# Patient Record
Sex: MALE | Race: WHITE | NOT HISPANIC OR LATINO | Employment: OTHER | URBAN - METROPOLITAN AREA
[De-identification: names, ages, dates, MRNs, and addresses within clinical notes are randomized per-mention and may not be internally consistent; named-entity substitution may affect disease eponyms.]

---

## 2023-05-09 ENCOUNTER — OFFICE VISIT (OUTPATIENT)
Dept: PRIMARY CARE | Facility: CLINIC | Age: 78
End: 2023-05-09
Payer: MEDICARE

## 2023-05-09 VITALS
SYSTOLIC BLOOD PRESSURE: 118 MMHG | HEART RATE: 92 BPM | HEIGHT: 69 IN | BODY MASS INDEX: 25.18 KG/M2 | OXYGEN SATURATION: 99 % | WEIGHT: 170 LBS | DIASTOLIC BLOOD PRESSURE: 77 MMHG

## 2023-05-09 DIAGNOSIS — S76.309S HAMSTRING INJURY, SEQUELA: Primary | ICD-10-CM

## 2023-05-09 PROCEDURE — 99213 OFFICE O/P EST LOW 20 MIN: CPT | Performed by: FAMILY MEDICINE

## 2023-05-09 RX ORDER — ATORVASTATIN CALCIUM 20 MG/1
1 TABLET, FILM COATED ORAL DAILY
COMMUNITY
Start: 2018-12-18 | End: 2023-11-05

## 2023-05-09 RX ORDER — WARFARIN 4 MG/1
1 TABLET ORAL DAILY
COMMUNITY
Start: 2014-04-07 | End: 2023-12-27 | Stop reason: SDUPTHER

## 2023-05-09 RX ORDER — METOPROLOL TARTRATE 25 MG/1
1 TABLET, FILM COATED ORAL 2 TIMES DAILY
COMMUNITY
Start: 2015-03-31 | End: 2023-11-05

## 2023-05-09 ASSESSMENT — ENCOUNTER SYMPTOMS
CARDIOVASCULAR NEGATIVE: 1
GASTROINTESTINAL NEGATIVE: 1
MYALGIAS: 1
CONSTITUTIONAL NEGATIVE: 1
ARTHRALGIAS: 1
LEG PAIN: 1

## 2023-05-09 NOTE — PROGRESS NOTES
Subjective   Patient ID: José Barth is a 78 y.o. male who presents for Leg Pain.  Leg Pain       Pt co hamstring m pain w decr rom  Review of Systems   Constitutional: Negative.    HENT: Negative.     Cardiovascular: Negative.    Gastrointestinal: Negative.    Genitourinary: Negative.    Musculoskeletal:  Positive for arthralgias, gait problem and myalgias.       Objective   Physical Exam  Constitutional:       Appearance: Normal appearance.   HENT:      Right Ear: Tympanic membrane normal.      Nose: Nose normal.      Mouth/Throat:      Mouth: Mucous membranes are moist.   Eyes:      Pupils: Pupils are equal, round, and reactive to light.   Cardiovascular:      Rate and Rhythm: Normal rate and regular rhythm.   Pulmonary:      Effort: Pulmonary effort is normal.      Breath sounds: Normal breath sounds.   Musculoskeletal:         General: Tenderness present.   Neurological:      Mental Status: He is alert.         Assessment/Plan

## 2023-07-25 ENCOUNTER — OFFICE VISIT (OUTPATIENT)
Dept: PRIMARY CARE | Facility: CLINIC | Age: 78
End: 2023-07-25
Payer: MEDICARE

## 2023-07-25 VITALS
HEIGHT: 69 IN | SYSTOLIC BLOOD PRESSURE: 133 MMHG | WEIGHT: 165 LBS | DIASTOLIC BLOOD PRESSURE: 82 MMHG | BODY MASS INDEX: 24.44 KG/M2 | HEART RATE: 66 BPM | OXYGEN SATURATION: 96 %

## 2023-07-25 DIAGNOSIS — L73.9 FOLLICULITIS: Primary | ICD-10-CM

## 2023-07-25 PROCEDURE — 99213 OFFICE O/P EST LOW 20 MIN: CPT | Performed by: FAMILY MEDICINE

## 2023-07-25 RX ORDER — CEPHALEXIN 500 MG/1
500 CAPSULE ORAL 3 TIMES DAILY
Qty: 30 CAPSULE | Refills: 0 | Status: SHIPPED | OUTPATIENT
Start: 2023-07-25 | End: 2023-08-04

## 2023-07-25 ASSESSMENT — ENCOUNTER SYMPTOMS
CONSTITUTIONAL NEGATIVE: 1
RESPIRATORY NEGATIVE: 1
CARDIOVASCULAR NEGATIVE: 1

## 2023-07-25 NOTE — PROGRESS NOTES
Subjective   Patient ID: José Barth is a 78 y.o. male.    Rash      Punctate rash on trunk mildly puritic and painfull red no pustules no vescicles  Review of Systems   Constitutional: Negative.    HENT: Negative.     Respiratory: Negative.     Cardiovascular: Negative.    Skin:  Positive for rash.       Objective   Physical Exam  HENT:      Head: Normocephalic and atraumatic.      Nose: Nose normal.      Mouth/Throat:      Mouth: Mucous membranes are moist.   Eyes:      Extraocular Movements: Extraocular movements intact.      Pupils: Pupils are equal, round, and reactive to light.   Cardiovascular:      Rate and Rhythm: Rhythm irregular.   Pulmonary:      Effort: Pulmonary effort is normal.      Breath sounds: Normal breath sounds.   Musculoskeletal:         General: Normal range of motion.   Skin:     Findings: Rash present.         Assessment/Plan   There are no diagnoses linked to this encounter.

## 2023-07-27 ENCOUNTER — APPOINTMENT (OUTPATIENT)
Dept: PRIMARY CARE | Facility: CLINIC | Age: 78
End: 2023-07-27
Payer: MEDICARE

## 2023-09-21 ENCOUNTER — OFFICE VISIT (OUTPATIENT)
Dept: PRIMARY CARE | Facility: CLINIC | Age: 78
End: 2023-09-21
Payer: MEDICARE

## 2023-09-21 VITALS
SYSTOLIC BLOOD PRESSURE: 121 MMHG | WEIGHT: 163 LBS | HEIGHT: 69 IN | DIASTOLIC BLOOD PRESSURE: 73 MMHG | OXYGEN SATURATION: 95 % | BODY MASS INDEX: 24.14 KG/M2 | HEART RATE: 76 BPM

## 2023-09-21 DIAGNOSIS — M54.50 ACUTE BILATERAL LOW BACK PAIN WITHOUT SCIATICA: Primary | ICD-10-CM

## 2023-09-21 PROCEDURE — 99214 OFFICE O/P EST MOD 30 MIN: CPT | Performed by: FAMILY MEDICINE

## 2023-09-21 ASSESSMENT — ENCOUNTER SYMPTOMS
BACK PAIN: 1
RESPIRATORY NEGATIVE: 1
CONSTITUTIONAL NEGATIVE: 1
CARDIOVASCULAR NEGATIVE: 1

## 2023-09-21 NOTE — PROGRESS NOTES
Subjective   Patient ID: José Barth is a 78 y.o. male who presents for Back Pain (Fell 5 days ago).  Back Pain      Thoracolumbar spine pain sp fall   Review of Systems   Constitutional: Negative.    HENT: Negative.     Respiratory: Negative.     Cardiovascular: Negative.    Genitourinary: Negative.    Musculoskeletal:  Positive for back pain.       Objective   Physical Exam  General no acute process no icterus well-hydrated alert active oriented    HEENT normocephalic no palpable tenderness eyes pupils equal reactive light and accommodation extraocular muscles intact no icterus and/or erythema ears benign external auditory canal no gross deformities nose no discharge drainage erythema bleeding throat no erythema.    Heart regular rate and rhythm without S3-S4 or murmur    Lungs clear to auscultation x2 no rales or rhonchi    Abdomen soft nontender nondistended no palpable masses no organomegaly splenomegaly.    Integument no rash no lumps bumps or concerning lesions.    Neurologic no tics tremors or seizures no decreased range of motion or ataxia.    Musculoskeletal good range of motion no gross abnormalities noted patient has some diminished range of motion to the thoracic spine with palpable tenderness we will send him for some x-rays at home in all probability he might have a crush fracture of one of his thoracic vertebrae  Assessment/Plan   Diagnoses and all orders for this visit:  Acute bilateral low back pain without sciatica  -     XR thoracic spine 3 views; Future  -     XR lumbar spine complete 4+ views    As an addendum patient was noted to have a compression fracture of the thoracic spine patient said he is doing well he is can to take it easy told him he should probably follow-up in a month

## 2023-09-25 ENCOUNTER — TELEPHONE (OUTPATIENT)
Dept: PRIMARY CARE | Facility: CLINIC | Age: 78
End: 2023-09-25

## 2023-10-04 ENCOUNTER — ANTICOAGULATION - WARFARIN VISIT (OUTPATIENT)
Dept: PHARMACY | Facility: CLINIC | Age: 78
End: 2023-10-04
Payer: MEDICARE

## 2023-10-04 DIAGNOSIS — I48.91 ATRIAL FIBRILLATION, UNSPECIFIED TYPE (MULTI): Primary | ICD-10-CM

## 2023-10-04 LAB
POC INR: 3.9
POC PROTHROMBIN TIME: NORMAL

## 2023-10-04 PROCEDURE — 99212 OFFICE O/P EST SF 10 MIN: CPT

## 2023-10-04 PROCEDURE — 85610 PROTHROMBIN TIME: CPT

## 2023-10-04 NOTE — PROGRESS NOTES
Coumadin Clinic Visit Note    Patient verified warfarin dose  No missed doses  PT fell a few weeks ago and has a compression fracture in his back   No unusual bruising or bleeding  Pt has been taking acetaminophen 500 mg po twice daily for the last 2 weeks for his back pain  No other changes to medications  Consistent dietary green intake  No anticipated procedures at this time  INR Supratherapeutic today at 3.9  Inr most likely due to increase in acetaminophen use  Hold today's warfarin dose  Keep weekly dose same for now   PT will try to increase greens slightly when take acetaminophen as well as try and decrease acetaminophen use if possible  Next appointment 3 weeks    Zaynab Carney, Pharm D

## 2023-10-23 PROBLEM — M25.559 ARTHRALGIA OF HIP: Status: ACTIVE | Noted: 2023-10-23

## 2023-10-23 PROBLEM — Z79.01 ENCOUNTER FOR CURRENT LONG-TERM USE OF ANTICOAGULANTS: Status: ACTIVE | Noted: 2022-11-02

## 2023-10-23 PROBLEM — M54.32 SCIATICA OF LEFT SIDE WITHOUT BACK PAIN: Status: ACTIVE | Noted: 2023-10-23

## 2023-10-23 PROBLEM — I48.21 PERMANENT ATRIAL FIBRILLATION (MULTI): Status: ACTIVE | Noted: 2022-11-02

## 2023-10-23 PROBLEM — E78.00 PURE HYPERCHOLESTEROLEMIA: Status: ACTIVE | Noted: 2023-10-23

## 2023-10-23 PROBLEM — E78.5 HYPERLIPEMIA: Status: ACTIVE | Noted: 2023-10-23

## 2023-10-23 PROBLEM — Z96.642 PRESENCE OF LEFT ARTIFICIAL HIP JOINT: Status: ACTIVE | Noted: 2022-11-02

## 2023-10-23 PROBLEM — I83.90 VARICOSE VEIN OF LEG: Status: ACTIVE | Noted: 2023-10-23

## 2023-10-23 PROBLEM — Z96.642 STATUS POST LEFT HIP REPLACEMENT: Status: ACTIVE | Noted: 2023-10-23

## 2023-10-23 PROBLEM — M54.16 LUMBAR RADICULOPATHY: Status: ACTIVE | Noted: 2023-10-23

## 2023-10-23 PROBLEM — M79.89 SWELLING OF LOWER EXTREMITY: Status: ACTIVE | Noted: 2023-10-23

## 2023-10-23 PROBLEM — M16.12 ARTHRITIS OF LEFT HIP: Status: ACTIVE | Noted: 2023-10-23

## 2023-10-23 PROBLEM — M19.90 OSTEOARTHRITIS: Status: ACTIVE | Noted: 2023-10-23

## 2023-10-23 RX ORDER — AMOXICILLIN 500 MG/1
4 TABLET, FILM COATED ORAL
Status: ON HOLD | COMMUNITY
Start: 2023-06-09 | End: 2024-03-08 | Stop reason: WASHOUT

## 2023-10-23 RX ORDER — METOPROLOL SUCCINATE 50 MG/1
50 TABLET, EXTENDED RELEASE ORAL DAILY
Status: ON HOLD | COMMUNITY
Start: 2022-11-04 | End: 2024-03-08 | Stop reason: WASHOUT

## 2023-10-23 RX ORDER — GUAIFENESIN 1200 MG
325 TABLET, EXTENDED RELEASE 12 HR ORAL DAILY
COMMUNITY
Start: 2022-11-04 | End: 2024-03-13 | Stop reason: HOSPADM

## 2023-10-23 RX ORDER — DOCUSATE SODIUM 100 MG/1
100 CAPSULE, LIQUID FILLED ORAL DAILY
Status: ON HOLD | COMMUNITY
Start: 2022-11-04 | End: 2024-03-08 | Stop reason: WASHOUT

## 2023-10-23 RX ORDER — OXYCODONE HYDROCHLORIDE 5 MG/1
5 TABLET ORAL EVERY 4 HOURS PRN
Status: ON HOLD | COMMUNITY
Start: 2022-11-03 | End: 2024-03-08 | Stop reason: WASHOUT

## 2023-10-23 RX ORDER — ENOXAPARIN SODIUM 100 MG/ML
30 INJECTION SUBCUTANEOUS 2 TIMES DAILY
Status: ON HOLD | COMMUNITY
Start: 2022-11-03 | End: 2024-03-08 | Stop reason: WASHOUT

## 2023-10-23 RX ORDER — POLYETHYLENE GLYCOL 3350 17 G/17G
POWDER, FOR SOLUTION ORAL 2 TIMES DAILY
Status: ON HOLD | COMMUNITY
Start: 2022-11-04 | End: 2024-03-08 | Stop reason: WASHOUT

## 2023-10-23 RX ORDER — ENOXAPARIN SODIUM 100 MG/ML
30 INJECTION SUBCUTANEOUS DAILY
Status: ON HOLD | COMMUNITY
Start: 2022-11-04 | End: 2024-03-08 | Stop reason: WASHOUT

## 2023-10-25 ENCOUNTER — ANTICOAGULATION - WARFARIN VISIT (OUTPATIENT)
Dept: PHARMACY | Facility: CLINIC | Age: 78
End: 2023-10-25
Payer: MEDICARE

## 2023-10-25 DIAGNOSIS — I48.91 ATRIAL FIBRILLATION, UNSPECIFIED TYPE (MULTI): Primary | ICD-10-CM

## 2023-10-25 LAB
POC INR: 3.4
POC PROTHROMBIN TIME: NORMAL

## 2023-10-25 PROCEDURE — 85610 PROTHROMBIN TIME: CPT | Mod: QW

## 2023-10-25 PROCEDURE — 99212 OFFICE O/P EST SF 10 MIN: CPT

## 2023-10-25 NOTE — PROGRESS NOTES
Coumadin Clinic Visit Note    Patient verified warfarin dose; confirms skipping dose after last visit  No missed doses  No unusual bruising or bleeding  No changes to medications; no longer taking Tylenol for back pain; stopped ~2 weeks ago  Consistent dietary green intake; a little bit more than last visit  No anticipated procedures at this time  INR Supratherapeutic today at 3.4; has been high for last 3 visits on same dose; thought it was Tylenol after last visit, so no dose change was made at that time  Will reduce weekly dose from 28 to 26 mg (7.1% decrease).  Now take half tablet (2 mg) on Wednesdays  Next appointment 4 weeks    Ludin Gatica, PharmD

## 2023-11-03 DIAGNOSIS — I10 HYPERTENSION, UNSPECIFIED TYPE: Primary | ICD-10-CM

## 2023-11-05 RX ORDER — METOPROLOL TARTRATE 25 MG/1
25 TABLET, FILM COATED ORAL 2 TIMES DAILY
Qty: 60 TABLET | Refills: 0 | Status: SHIPPED | OUTPATIENT
Start: 2023-11-05 | End: 2023-12-14 | Stop reason: SDUPTHER

## 2023-11-05 RX ORDER — ATORVASTATIN CALCIUM 20 MG/1
20 TABLET, FILM COATED ORAL DAILY
Qty: 30 TABLET | Refills: 0 | Status: SHIPPED | OUTPATIENT
Start: 2023-11-05 | End: 2023-12-14 | Stop reason: SDUPTHER

## 2023-11-22 ENCOUNTER — ANTICOAGULATION - WARFARIN VISIT (OUTPATIENT)
Dept: PHARMACY | Facility: CLINIC | Age: 78
End: 2023-11-22
Payer: MEDICARE

## 2023-11-22 ENCOUNTER — APPOINTMENT (OUTPATIENT)
Dept: PHARMACY | Facility: CLINIC | Age: 78
End: 2023-11-22
Payer: MEDICARE

## 2023-11-22 DIAGNOSIS — I48.91 ATRIAL FIBRILLATION, UNSPECIFIED TYPE (MULTI): Primary | ICD-10-CM

## 2023-11-22 NOTE — PROGRESS NOTES
Patient rescheduled from 11/22 to 11/29/23   Updated tracker to reflect updated appt date     Carrie Bhatti, Ronit, BCPS

## 2023-11-24 DIAGNOSIS — I10 HYPERTENSION, UNSPECIFIED TYPE: ICD-10-CM

## 2023-11-24 DIAGNOSIS — E78.5 HYPERLIPIDEMIA, UNSPECIFIED HYPERLIPIDEMIA TYPE: ICD-10-CM

## 2023-11-29 ENCOUNTER — ANTICOAGULATION - WARFARIN VISIT (OUTPATIENT)
Dept: PHARMACY | Facility: CLINIC | Age: 78
End: 2023-11-29
Payer: MEDICARE

## 2023-11-29 DIAGNOSIS — I48.91 ATRIAL FIBRILLATION, UNSPECIFIED TYPE (MULTI): Primary | ICD-10-CM

## 2023-11-29 LAB
POC INR: 3.1
POC PROTHROMBIN TIME: NORMAL

## 2023-11-29 PROCEDURE — 99212 OFFICE O/P EST SF 10 MIN: CPT

## 2023-11-29 PROCEDURE — 85610 PROTHROMBIN TIME: CPT | Mod: QW

## 2023-11-29 NOTE — PROGRESS NOTES
Coumadin Clinic Visit Note    Patient verified warfarin dose  No missed doses  No unusual bruising or bleeding  No changes to medications  Consistent dietary green intake  No anticipated procedures at this time  INR Supratherapeutic today at 3.1, but will keep same dose  No changes to warfarin dose today  Next appointment 4 weeks      Rudy Ortiz, PharmD

## 2023-12-14 RX ORDER — ATORVASTATIN CALCIUM 20 MG/1
20 TABLET, FILM COATED ORAL DAILY
Qty: 90 TABLET | Refills: 0 | Status: SHIPPED | OUTPATIENT
Start: 2023-12-14 | End: 2024-02-08 | Stop reason: SDUPTHER

## 2023-12-14 RX ORDER — METOPROLOL TARTRATE 25 MG/1
25 TABLET, FILM COATED ORAL 2 TIMES DAILY
Qty: 180 TABLET | Refills: 0 | Status: SHIPPED | OUTPATIENT
Start: 2023-12-14 | End: 2024-02-08 | Stop reason: SDUPTHER

## 2023-12-27 DIAGNOSIS — I48.91 ATRIAL FIBRILLATION, UNSPECIFIED TYPE (MULTI): Primary | ICD-10-CM

## 2023-12-27 RX ORDER — WARFARIN 4 MG/1
TABLET ORAL
Qty: 100 TABLET | Refills: 1 | Status: SHIPPED | OUTPATIENT
Start: 2023-12-27 | End: 2024-05-16

## 2023-12-27 RX ORDER — WARFARIN 4 MG/1
4 TABLET ORAL
Qty: 90 TABLET | Refills: 4 | OUTPATIENT
Start: 2023-12-27

## 2023-12-28 ENCOUNTER — ANTICOAGULATION - WARFARIN VISIT (OUTPATIENT)
Dept: PHARMACY | Facility: CLINIC | Age: 78
End: 2023-12-28
Payer: MEDICARE

## 2023-12-28 DIAGNOSIS — I48.91 ATRIAL FIBRILLATION, UNSPECIFIED TYPE (MULTI): Primary | ICD-10-CM

## 2023-12-28 LAB
POC INR: 1.4
POC PROTHROMBIN TIME: NORMAL

## 2023-12-28 PROCEDURE — 99212 OFFICE O/P EST SF 10 MIN: CPT

## 2023-12-28 PROCEDURE — 85610 PROTHROMBIN TIME: CPT | Mod: QW

## 2023-12-28 NOTE — PROGRESS NOTES
No bleeding or unusual bruising.  Medications and doses verified.  No scheduled procedures at this time.  INR=1.4  No missed doses.    Pt admits to eating a lot more greens (broccoli and cauliflower) this week.  Plan: Boost dose today, then continue same weekly dose, and return to regular diet.   Follow up INR check in 2 weeks.  Pt will be going to [place] in February and will need an order for lab.

## 2024-01-11 ENCOUNTER — ANTICOAGULATION - WARFARIN VISIT (OUTPATIENT)
Dept: PHARMACY | Facility: CLINIC | Age: 79
End: 2024-01-11
Payer: MEDICARE

## 2024-01-11 ENCOUNTER — APPOINTMENT (OUTPATIENT)
Dept: PHARMACY | Facility: CLINIC | Age: 79
End: 2024-01-11
Payer: MEDICARE

## 2024-01-11 DIAGNOSIS — I48.91 ATRIAL FIBRILLATION, UNSPECIFIED TYPE (MULTI): Primary | ICD-10-CM

## 2024-01-11 LAB
POC INR: 1.8
POC PROTHROMBIN TIME: NORMAL

## 2024-01-11 PROCEDURE — 85610 PROTHROMBIN TIME: CPT | Mod: QW | Performed by: INTERNAL MEDICINE

## 2024-01-11 PROCEDURE — 99212 OFFICE O/P EST SF 10 MIN: CPT | Performed by: INTERNAL MEDICINE

## 2024-01-11 NOTE — PROGRESS NOTES
Coumadin Clinic Visit Note    Patient verified warfarin dose  No missed doses  No unusual bruising or bleeding  No changes to medications  Consistent dietary green intake  No anticipated procedures at this time  INR Subtherapeutic today at 1.8, no new MTV , no boost or ensure , no stopped or changed otc , looking at trend , was on high inr few month , dose was decreased and now trending down , may needto increase dose next visit   No changes to warfarin dose today  Next appointment 3 weeks      Patricia Pa, PharmD

## 2024-02-01 ENCOUNTER — ANTICOAGULATION - WARFARIN VISIT (OUTPATIENT)
Dept: PHARMACY | Facility: CLINIC | Age: 79
End: 2024-02-01
Payer: MEDICARE

## 2024-02-01 ENCOUNTER — CLINICAL SUPPORT (OUTPATIENT)
Dept: PHARMACY | Facility: CLINIC | Age: 79
End: 2024-02-01
Payer: MEDICARE

## 2024-02-01 DIAGNOSIS — I48.91 ATRIAL FIBRILLATION, UNSPECIFIED TYPE (MULTI): Primary | ICD-10-CM

## 2024-02-01 LAB
POC INR: 2
POC PROTHROMBIN TIME: NORMAL

## 2024-02-01 PROCEDURE — 85610 PROTHROMBIN TIME: CPT | Mod: QW

## 2024-02-01 PROCEDURE — 99212 OFFICE O/P EST SF 10 MIN: CPT

## 2024-02-01 NOTE — PROGRESS NOTES
No bleeding or unusual bruising.  Medications and doses verified.  No scheduled procedures at this time.  INR=2.0   Plan: Continue same weekly dose.  Follow up INR check in 4 weeks.

## 2024-02-07 DIAGNOSIS — I10 HYPERTENSION, UNSPECIFIED TYPE: ICD-10-CM

## 2024-02-07 DIAGNOSIS — E78.5 HYPERLIPIDEMIA, UNSPECIFIED HYPERLIPIDEMIA TYPE: ICD-10-CM

## 2024-02-08 RX ORDER — ATORVASTATIN CALCIUM 20 MG/1
20 TABLET, FILM COATED ORAL DAILY
Qty: 90 TABLET | Refills: 3 | Status: SHIPPED | OUTPATIENT
Start: 2024-02-08

## 2024-02-08 RX ORDER — METOPROLOL TARTRATE 25 MG/1
25 TABLET, FILM COATED ORAL 2 TIMES DAILY
Qty: 180 TABLET | Refills: 3 | Status: SHIPPED | OUTPATIENT
Start: 2024-02-08

## 2024-02-29 ENCOUNTER — ANTICOAGULATION - WARFARIN VISIT (OUTPATIENT)
Dept: PHARMACY | Facility: CLINIC | Age: 79
End: 2024-02-29
Payer: MEDICARE

## 2024-02-29 DIAGNOSIS — I48.91 ATRIAL FIBRILLATION, UNSPECIFIED TYPE (MULTI): Primary | ICD-10-CM

## 2024-02-29 LAB
POC INR: 1.9
POC PROTHROMBIN TIME: NORMAL

## 2024-02-29 PROCEDURE — 85610 PROTHROMBIN TIME: CPT | Mod: QW | Performed by: PHARMACIST

## 2024-02-29 PROCEDURE — 99212 OFFICE O/P EST SF 10 MIN: CPT | Performed by: PHARMACIST

## 2024-02-29 NOTE — PROGRESS NOTES
Verified current dose with pt.    No new meds or med changes since last visit.  Pt denies unusual bleed/bruise.  No upcoming procedures.  Inr = 1.9  Boost 6 mg today (thurs)  Continue same dose and check again in 3 weeks.

## 2024-03-05 DIAGNOSIS — I48.0 PAROXYSMAL ATRIAL FIBRILLATION (MULTI): Primary | ICD-10-CM

## 2024-03-07 ENCOUNTER — APPOINTMENT (OUTPATIENT)
Dept: CARDIOLOGY | Facility: HOSPITAL | Age: 79
End: 2024-03-07
Payer: MEDICARE

## 2024-03-07 ENCOUNTER — APPOINTMENT (OUTPATIENT)
Dept: RADIOLOGY | Facility: HOSPITAL | Age: 79
End: 2024-03-07
Payer: MEDICARE

## 2024-03-07 ENCOUNTER — HOSPITAL ENCOUNTER (EMERGENCY)
Facility: HOSPITAL | Age: 79
Discharge: OTHER INSTITUTION | End: 2024-03-08
Attending: EMERGENCY MEDICINE
Payer: MEDICARE

## 2024-03-07 DIAGNOSIS — M97.8XXA PERIPROSTHETIC FRACTURE OF HIP, INITIAL ENCOUNTER: Primary | ICD-10-CM

## 2024-03-07 DIAGNOSIS — Z96.649 PERIPROSTHETIC FRACTURE OF HIP, INITIAL ENCOUNTER: Primary | ICD-10-CM

## 2024-03-07 LAB
ABO GROUP (TYPE) IN BLOOD: NORMAL
ANION GAP SERPL CALC-SCNC: 13 MMOL/L (ref 10–20)
ANTIBODY SCREEN: NORMAL
BASOPHILS # BLD AUTO: 0.01 X10*3/UL (ref 0–0.1)
BASOPHILS NFR BLD AUTO: 0.1 %
BUN SERPL-MCNC: 24 MG/DL (ref 6–23)
CALCIUM SERPL-MCNC: 8.9 MG/DL (ref 8.6–10.3)
CHLORIDE SERPL-SCNC: 107 MMOL/L (ref 98–107)
CO2 SERPL-SCNC: 23 MMOL/L (ref 21–32)
CREAT SERPL-MCNC: 1.07 MG/DL (ref 0.5–1.3)
EGFRCR SERPLBLD CKD-EPI 2021: 70 ML/MIN/1.73M*2
EOSINOPHIL # BLD AUTO: 0.02 X10*3/UL (ref 0–0.4)
EOSINOPHIL NFR BLD AUTO: 0.2 %
ERYTHROCYTE [DISTWIDTH] IN BLOOD BY AUTOMATED COUNT: 12.7 % (ref 11.5–14.5)
ERYTHROCYTE [DISTWIDTH] IN BLOOD BY AUTOMATED COUNT: 12.7 % (ref 11.5–14.5)
GLUCOSE SERPL-MCNC: 93 MG/DL (ref 74–99)
HCT VFR BLD AUTO: 38 % (ref 41–52)
HCT VFR BLD AUTO: 40.1 % (ref 41–52)
HGB BLD-MCNC: 13.1 G/DL (ref 13.5–17.5)
HGB BLD-MCNC: 13.5 G/DL (ref 13.5–17.5)
IMM GRANULOCYTES # BLD AUTO: 0.08 X10*3/UL (ref 0–0.5)
IMM GRANULOCYTES NFR BLD AUTO: 0.7 % (ref 0–0.9)
INR PPP: 2.4 (ref 0.9–1.1)
LYMPHOCYTES # BLD AUTO: 0.59 X10*3/UL (ref 0.8–3)
LYMPHOCYTES NFR BLD AUTO: 5.5 %
MCH RBC QN AUTO: 34.2 PG (ref 26–34)
MCH RBC QN AUTO: 34.6 PG (ref 26–34)
MCHC RBC AUTO-ENTMCNC: 33.7 G/DL (ref 32–36)
MCHC RBC AUTO-ENTMCNC: 34.5 G/DL (ref 32–36)
MCV RBC AUTO: 100 FL (ref 80–100)
MCV RBC AUTO: 102 FL (ref 80–100)
MONOCYTES # BLD AUTO: 0.76 X10*3/UL (ref 0.05–0.8)
MONOCYTES NFR BLD AUTO: 7 %
NEUTROPHILS # BLD AUTO: 9.34 X10*3/UL (ref 1.6–5.5)
NEUTROPHILS NFR BLD AUTO: 86.5 %
NRBC BLD-RTO: 0 /100 WBCS (ref 0–0)
NRBC BLD-RTO: 0 /100 WBCS (ref 0–0)
PLATELET # BLD AUTO: 120 X10*3/UL (ref 150–450)
PLATELET # BLD AUTO: 129 X10*3/UL (ref 150–450)
POTASSIUM SERPL-SCNC: 4.3 MMOL/L (ref 3.5–5.3)
PROTHROMBIN TIME: 26.9 SECONDS (ref 9.8–12.8)
RBC # BLD AUTO: 3.79 X10*6/UL (ref 4.5–5.9)
RBC # BLD AUTO: 3.95 X10*6/UL (ref 4.5–5.9)
RH FACTOR (ANTIGEN D): NORMAL
SODIUM SERPL-SCNC: 139 MMOL/L (ref 136–145)
WBC # BLD AUTO: 10.2 X10*3/UL (ref 4.4–11.3)
WBC # BLD AUTO: 10.8 X10*3/UL (ref 4.4–11.3)

## 2024-03-07 PROCEDURE — 72170 X-RAY EXAM OF PELVIS: CPT | Performed by: RADIOLOGY

## 2024-03-07 PROCEDURE — 73552 X-RAY EXAM OF FEMUR 2/>: CPT | Mod: LT

## 2024-03-07 PROCEDURE — 72170 X-RAY EXAM OF PELVIS: CPT

## 2024-03-07 PROCEDURE — 2500000004 HC RX 250 GENERAL PHARMACY W/ HCPCS (ALT 636 FOR OP/ED): Performed by: NURSE PRACTITIONER

## 2024-03-07 PROCEDURE — 96374 THER/PROPH/DIAG INJ IV PUSH: CPT

## 2024-03-07 PROCEDURE — 96375 TX/PRO/DX INJ NEW DRUG ADDON: CPT

## 2024-03-07 PROCEDURE — 73700 CT LOWER EXTREMITY W/O DYE: CPT | Mod: LT

## 2024-03-07 PROCEDURE — 73552 X-RAY EXAM OF FEMUR 2/>: CPT | Performed by: RADIOLOGY

## 2024-03-07 PROCEDURE — 86901 BLOOD TYPING SEROLOGIC RH(D): CPT | Performed by: EMERGENCY MEDICINE

## 2024-03-07 PROCEDURE — 85027 COMPLETE CBC AUTOMATED: CPT | Mod: 59 | Performed by: EMERGENCY MEDICINE

## 2024-03-07 PROCEDURE — 73030 X-RAY EXAM OF SHOULDER: CPT | Mod: LEFT SIDE | Performed by: RADIOLOGY

## 2024-03-07 PROCEDURE — 93005 ELECTROCARDIOGRAM TRACING: CPT

## 2024-03-07 PROCEDURE — 36415 COLL VENOUS BLD VENIPUNCTURE: CPT | Performed by: NURSE PRACTITIONER

## 2024-03-07 PROCEDURE — 80048 BASIC METABOLIC PNL TOTAL CA: CPT | Performed by: NURSE PRACTITIONER

## 2024-03-07 PROCEDURE — 2500000004 HC RX 250 GENERAL PHARMACY W/ HCPCS (ALT 636 FOR OP/ED): Performed by: EMERGENCY MEDICINE

## 2024-03-07 PROCEDURE — 73030 X-RAY EXAM OF SHOULDER: CPT | Mod: LT

## 2024-03-07 PROCEDURE — 36415 COLL VENOUS BLD VENIPUNCTURE: CPT | Performed by: EMERGENCY MEDICINE

## 2024-03-07 PROCEDURE — 96361 HYDRATE IV INFUSION ADD-ON: CPT

## 2024-03-07 PROCEDURE — 99285 EMERGENCY DEPT VISIT HI MDM: CPT | Mod: 25

## 2024-03-07 PROCEDURE — 73700 CT LOWER EXTREMITY W/O DYE: CPT | Mod: LEFT SIDE | Performed by: RADIOLOGY

## 2024-03-07 PROCEDURE — 85025 COMPLETE CBC W/AUTO DIFF WBC: CPT | Performed by: NURSE PRACTITIONER

## 2024-03-07 PROCEDURE — 85610 PROTHROMBIN TIME: CPT | Performed by: NURSE PRACTITIONER

## 2024-03-07 PROCEDURE — 2500000002 HC RX 250 W HCPCS SELF ADMINISTERED DRUGS (ALT 637 FOR MEDICARE OP, ALT 636 FOR OP/ED): Performed by: EMERGENCY MEDICINE

## 2024-03-07 RX ORDER — PHYTONADIONE 5 MG/1
5 TABLET ORAL ONCE
Status: COMPLETED | OUTPATIENT
Start: 2024-03-07 | End: 2024-03-07

## 2024-03-07 RX ORDER — MORPHINE SULFATE 4 MG/ML
4 INJECTION, SOLUTION INTRAMUSCULAR; INTRAVENOUS ONCE
Status: COMPLETED | OUTPATIENT
Start: 2024-03-07 | End: 2024-03-07

## 2024-03-07 RX ORDER — FENTANYL CITRATE 50 UG/ML
50 INJECTION, SOLUTION INTRAMUSCULAR; INTRAVENOUS ONCE
Status: COMPLETED | OUTPATIENT
Start: 2024-03-07 | End: 2024-03-07

## 2024-03-07 RX ADMIN — HYDROMORPHONE HYDROCHLORIDE 0.5 MG: 1 INJECTION, SOLUTION INTRAMUSCULAR; INTRAVENOUS; SUBCUTANEOUS at 17:17

## 2024-03-07 RX ADMIN — MORPHINE SULFATE 4 MG: 4 INJECTION, SOLUTION INTRAMUSCULAR; INTRAVENOUS at 15:22

## 2024-03-07 RX ADMIN — FENTANYL CITRATE 50 MCG: 50 INJECTION INTRAMUSCULAR; INTRAVENOUS at 16:39

## 2024-03-07 RX ADMIN — PHYTONADIONE 5 MG: 5 TABLET ORAL at 18:27

## 2024-03-07 RX ADMIN — SODIUM CHLORIDE, POTASSIUM CHLORIDE, SODIUM LACTATE AND CALCIUM CHLORIDE 1000 ML: 600; 310; 30; 20 INJECTION, SOLUTION INTRAVENOUS at 19:29

## 2024-03-07 ASSESSMENT — PAIN DESCRIPTION - LOCATION: LOCATION: HIP

## 2024-03-07 ASSESSMENT — PAIN DESCRIPTION - PAIN TYPE: TYPE: ACUTE PAIN

## 2024-03-07 ASSESSMENT — COLUMBIA-SUICIDE SEVERITY RATING SCALE - C-SSRS
2. HAVE YOU ACTUALLY HAD ANY THOUGHTS OF KILLING YOURSELF?: NO
1. IN THE PAST MONTH, HAVE YOU WISHED YOU WERE DEAD OR WISHED YOU COULD GO TO SLEEP AND NOT WAKE UP?: NO
6. HAVE YOU EVER DONE ANYTHING, STARTED TO DO ANYTHING, OR PREPARED TO DO ANYTHING TO END YOUR LIFE?: NO

## 2024-03-07 ASSESSMENT — PAIN DESCRIPTION - ORIENTATION: ORIENTATION: LEFT

## 2024-03-07 ASSESSMENT — PAIN - FUNCTIONAL ASSESSMENT: PAIN_FUNCTIONAL_ASSESSMENT: 0-10

## 2024-03-07 ASSESSMENT — PAIN SCALES - GENERAL
PAINLEVEL_OUTOF10: 7
PAINLEVEL_OUTOF10: 10 - WORST POSSIBLE PAIN

## 2024-03-07 NOTE — ED PROVIDER NOTES
Chief Complaint   Patient presents with    Fall     Pt to ed [facility] after tripping over curb after getting the mail, pt is on warfarin. Pt complaining of left hip pain and a laceration to the right thumb. Pt has hx of left hip replacement. No loc or other injuries.        HPI       79 year old right hand dominant male presents to the Emergency Department today complaining of left shoulder and hip pain status post fall that occurred just prior to arrival. Notes that he tripped over a curb while getting his mail and landed on his left side. Suffered a laceration to the right thumb. Denies hitting their head, loss of consciousness, or seizure-like activity. Denies any other injuries. Unable to ambulate since the fall. Last Tetanus shot was more than 5 years ago.       History provided by:  Patient             Patient History   Past Medical History:   Diagnosis Date    Personal history of other diseases of the circulatory system     History of atrial fibrillation    Personal history of other specified conditions     History of fatigue     Past Surgical History:   Procedure Laterality Date    OTHER SURGICAL HISTORY  10/29/2021    Thyroid biopsy     Family History   Problem Relation Name Age of Onset    Heart attack Father       Social History     Tobacco Use    Smoking status: Not on file    Smokeless tobacco: Not on file   Substance Use Topics    Alcohol use: Not on file    Drug use: Not on file           Physical Exam  Constitutional:       Appearance: Normal appearance.   HENT:      Head: Normocephalic.      Right Ear: External ear normal.      Left Ear: External ear normal.      Nose: Nose normal.      Mouth/Throat:      Mouth: Mucous membranes are moist.      Pharynx: Oropharynx is clear. No oropharyngeal exudate or posterior oropharyngeal erythema.   Eyes:      Conjunctiva/sclera: Conjunctivae normal.      Pupils: Pupils are equal, round, and reactive to light.   Cardiovascular:      Rate and Rhythm: Normal rate and  regular rhythm.      Pulses:           Radial pulses are 3+ on the right side and 3+ on the left side.        Dorsalis pedis pulses are 3+ on the right side and 3+ on the left side.      Heart sounds: Normal heart sounds. No murmur heard.     No friction rub. No gallop.   Pulmonary:      Effort: Pulmonary effort is normal. No respiratory distress.      Breath sounds: Normal breath sounds. No wheezing, rhonchi or rales.   Abdominal:      General: Abdomen is flat. Bowel sounds are normal.      Palpations: Abdomen is soft.      Tenderness: There is no abdominal tenderness. There is no right CVA tenderness, left CVA tenderness, guarding or rebound. Negative signs include Chapman's sign and McBurney's sign.   Musculoskeletal:         General: No swelling or deformity.      Cervical back: Full passive range of motion without pain.      Right lower leg: No edema.      Left lower leg: No edema.      Comments: Shortening and external rotation of left lower extremity. No obvious deformity, ecchymosis, or edema of the left shoulder, but there is tenderness noted over the proximal humerus. Limited ROM of the left lower extremity, but Full ROM of the left upper extremity. Left radial and dorsalis pedis pulses are strong and regular. Capillary refill was within normal limits. Sensation is intact distally.    Lymphadenopathy:      Cervical: No cervical adenopathy.   Skin:     Capillary Refill: Capillary refill takes less than 2 seconds.      Coloration: Skin is not jaundiced.      Findings: No rash.   Neurological:      General: No focal deficit present.      Mental Status: He is alert and oriented to person, place, and time. Mental status is at baseline.      Gait: Gait is intact.   Psychiatric:         Mood and Affect: Mood normal.         Behavior: Behavior is cooperative.         Labs Reviewed - No data to display    XR femur left 2+ views   Final Result   Acute, obliquely oriented periprosthetic fracture of the left   proximal  femur across region of femoral stem component of left hip   arthroplasty.             MACRO:   None        Signed by: Dave Hatfield 3/7/2024 2:21 PM   Dictation workstation:   [id number]      XR shoulder left 2+ views   Final Result   No acute osseous abnormality left shoulder.             MACRO:   None        Signed by: Dave Hatfield 3/7/2024 2:22 PM   Dictation workstation:   [id number]      XR pelvis 1-2 views   Final Result   Acute, obliquely oriented periprosthetic fracture of the left   proximal femur across region of femoral stem component of left hip   arthroplasty.             MACRO:   None        Signed by: Dave Hatfield 3/7/2024 2:21 PM   Dictation workstation:   [id number]               ED Course & MDM   Diagnoses as of 03/07/24 1536   Periprosthetic fracture of hip, initial encounter           Medical Decision Making  Patient was seen and evaluated by Dr. Gonzalez. Saline lock was established with labs drawn and results as above. Initially, he did not want anything for pain. Later, he was given Morphine with improvement in his pain. Discussed the case the Dr. Lopresti's group, who feels that the patient needs to be transferred to a tertiary care center that performs for periprosthetic repairs.     Diagnostic Impression:    1. Acute closed displaced left periprosthetic hip fracture    2. IV meds in ED           Your medication list        ASK your doctor about these medications        Instructions Last Dose Given Next Dose Due   amoxicillin 500 mg tablet  Commonly known as: Amoxil           atorvastatin 20 mg tablet  Commonly known as: Lipitor      TAKE 1 TABLET BY MOUTH ONCE  DAILY       docusate sodium 100 mg capsule  Commonly known as: Colace           enoxaparin 30 mg/0.3 mL syringe  Commonly known as: Lovenox           Lovenox 30 mg/0.3 mL syringe  Generic drug: enoxaparin           metoprolol succinate XL 50 mg 24 hr tablet  Commonly known as: Toprol-XL           metoprolol tartrate 25 mg  tablet  Commonly known as: Lopressor      TAKE 1 TABLET BY MOUTH TWICE  DAILY       oxyCODONE 5 mg immediate release tablet  Commonly known as: Roxicodone           polyethylene glycol 17 gram packet  Commonly known as: Glycolax, Miralax           TylenoL 325 mg capsule  Generic drug: acetaminophen           warfarin 4 mg tablet  Commonly known as: Coumadin      Take as directed. If you are unsure how to take this medication, talk to your nurse or doctor.  Original instructions: Take 1 tablet (4mg) by mouth once daily EXCEPT 0.5 tablet (2mg) by mouth on Wednesdays only or as directed per [facility]                  Procedure  Procedures     Amos Hill, KARAN-CNP  03/07/24 1600

## 2024-03-08 ENCOUNTER — APPOINTMENT (OUTPATIENT)
Dept: CARDIOLOGY | Facility: HOSPITAL | Age: 79
DRG: 467 | End: 2024-03-08
Payer: MEDICARE

## 2024-03-08 ENCOUNTER — HOSPITAL ENCOUNTER (INPATIENT)
Facility: HOSPITAL | Age: 79
LOS: 5 days | Discharge: NURSING, REHAB OR LONG-TERM CARE | DRG: 467 | End: 2024-03-13
Attending: INTERNAL MEDICINE | Admitting: INTERNAL MEDICINE
Payer: MEDICARE

## 2024-03-08 ENCOUNTER — APPOINTMENT (OUTPATIENT)
Dept: RADIOLOGY | Facility: HOSPITAL | Age: 79
DRG: 467 | End: 2024-03-08
Payer: MEDICARE

## 2024-03-08 ENCOUNTER — APPOINTMENT (OUTPATIENT)
Dept: RADIOLOGY | Facility: HOSPITAL | Age: 79
End: 2024-03-08
Payer: MEDICARE

## 2024-03-08 VITALS
OXYGEN SATURATION: 94 % | BODY MASS INDEX: 23.7 KG/M2 | WEIGHT: 160 LBS | HEIGHT: 69 IN | HEART RATE: 100 BPM | RESPIRATION RATE: 18 BRPM | TEMPERATURE: 98.6 F | DIASTOLIC BLOOD PRESSURE: 67 MMHG | SYSTOLIC BLOOD PRESSURE: 117 MMHG

## 2024-03-08 DIAGNOSIS — Z96.649 PERIPROSTHETIC FRACTURE OF HIP, SUBSEQUENT ENCOUNTER: ICD-10-CM

## 2024-03-08 DIAGNOSIS — R21 RASH: ICD-10-CM

## 2024-03-08 DIAGNOSIS — M97.8XXD PERIPROSTHETIC FRACTURE OF HIP, SUBSEQUENT ENCOUNTER: ICD-10-CM

## 2024-03-08 DIAGNOSIS — S72.002A CLOSED LEFT HIP FRACTURE, INITIAL ENCOUNTER (MULTI): Primary | ICD-10-CM

## 2024-03-08 DIAGNOSIS — M97.8XXA PERIPROSTHETIC FRACTURE OF HIP, INITIAL ENCOUNTER: ICD-10-CM

## 2024-03-08 DIAGNOSIS — Z96.649 PERIPROSTHETIC FRACTURE OF HIP, INITIAL ENCOUNTER: ICD-10-CM

## 2024-03-08 LAB
ALBUMIN SERPL BCP-MCNC: 3.3 G/DL (ref 3.4–5)
ALP SERPL-CCNC: 67 U/L (ref 33–136)
ALT SERPL W P-5'-P-CCNC: 19 U/L (ref 10–52)
ANION GAP SERPL CALC-SCNC: 12 MMOL/L (ref 10–20)
AST SERPL W P-5'-P-CCNC: 18 U/L (ref 9–39)
BILIRUB SERPL-MCNC: 2 MG/DL (ref 0–1.2)
BUN SERPL-MCNC: 26 MG/DL (ref 6–23)
CALCIUM SERPL-MCNC: 8.3 MG/DL (ref 8.6–10.3)
CHLORIDE SERPL-SCNC: 105 MMOL/L (ref 98–107)
CO2 SERPL-SCNC: 24 MMOL/L (ref 21–32)
CREAT SERPL-MCNC: 0.95 MG/DL (ref 0.5–1.3)
EGFRCR SERPLBLD CKD-EPI 2021: 81 ML/MIN/1.73M*2
ERYTHROCYTE [DISTWIDTH] IN BLOOD BY AUTOMATED COUNT: 13 % (ref 11.5–14.5)
GLUCOSE SERPL-MCNC: 107 MG/DL (ref 74–99)
HCT VFR BLD AUTO: 35.4 % (ref 41–52)
HGB BLD-MCNC: 12 G/DL (ref 13.5–17.5)
INR PPP: 2.5 (ref 0.9–1.1)
INR PPP: 2.7 (ref 0.9–1.1)
MCH RBC QN AUTO: 33.4 PG (ref 26–34)
MCHC RBC AUTO-ENTMCNC: 33.9 G/DL (ref 32–36)
MCV RBC AUTO: 99 FL (ref 80–100)
NRBC BLD-RTO: 0 /100 WBCS (ref 0–0)
PLATELET # BLD AUTO: 107 X10*3/UL (ref 150–450)
POTASSIUM SERPL-SCNC: 4.2 MMOL/L (ref 3.5–5.3)
PROT SERPL-MCNC: 5.1 G/DL (ref 6.4–8.2)
PROTHROMBIN TIME: 28.8 SECONDS (ref 9.8–12.8)
PROTHROMBIN TIME: 30.2 SECONDS (ref 9.8–12.8)
RBC # BLD AUTO: 3.59 X10*6/UL (ref 4.5–5.9)
SODIUM SERPL-SCNC: 137 MMOL/L (ref 136–145)
WBC # BLD AUTO: 8 X10*3/UL (ref 4.4–11.3)

## 2024-03-08 PROCEDURE — 99223 1ST HOSP IP/OBS HIGH 75: CPT | Performed by: INTERNAL MEDICINE

## 2024-03-08 PROCEDURE — 72192 CT PELVIS W/O DYE: CPT

## 2024-03-08 PROCEDURE — 85027 COMPLETE CBC AUTOMATED: CPT | Performed by: INTERNAL MEDICINE

## 2024-03-08 PROCEDURE — 1200000002 HC GENERAL ROOM WITH TELEMETRY DAILY

## 2024-03-08 PROCEDURE — 72170 X-RAY EXAM OF PELVIS: CPT | Performed by: RADIOLOGY

## 2024-03-08 PROCEDURE — 72170 X-RAY EXAM OF PELVIS: CPT

## 2024-03-08 PROCEDURE — 2500000002 HC RX 250 W HCPCS SELF ADMINISTERED DRUGS (ALT 637 FOR MEDICARE OP, ALT 636 FOR OP/ED): Performed by: INTERNAL MEDICINE

## 2024-03-08 PROCEDURE — 2500000005 HC RX 250 GENERAL PHARMACY W/O HCPCS: Performed by: NURSE PRACTITIONER

## 2024-03-08 PROCEDURE — 2500000004 HC RX 250 GENERAL PHARMACY W/ HCPCS (ALT 636 FOR OP/ED): Performed by: EMERGENCY MEDICINE

## 2024-03-08 PROCEDURE — 36415 COLL VENOUS BLD VENIPUNCTURE: CPT | Performed by: INTERNAL MEDICINE

## 2024-03-08 PROCEDURE — 80053 COMPREHEN METABOLIC PANEL: CPT | Performed by: INTERNAL MEDICINE

## 2024-03-08 PROCEDURE — 96376 TX/PRO/DX INJ SAME DRUG ADON: CPT

## 2024-03-08 PROCEDURE — 93005 ELECTROCARDIOGRAM TRACING: CPT

## 2024-03-08 PROCEDURE — 85610 PROTHROMBIN TIME: CPT | Performed by: INTERNAL MEDICINE

## 2024-03-08 PROCEDURE — G2212 PROLONG OUTPT/OFFICE VIS: HCPCS | Performed by: STUDENT IN AN ORGANIZED HEALTH CARE EDUCATION/TRAINING PROGRAM

## 2024-03-08 PROCEDURE — 2500000004 HC RX 250 GENERAL PHARMACY W/ HCPCS (ALT 636 FOR OP/ED): Performed by: INTERNAL MEDICINE

## 2024-03-08 PROCEDURE — 99223 1ST HOSP IP/OBS HIGH 75: CPT | Performed by: STUDENT IN AN ORGANIZED HEALTH CARE EDUCATION/TRAINING PROGRAM

## 2024-03-08 PROCEDURE — 72192 CT PELVIS W/O DYE: CPT | Performed by: RADIOLOGY

## 2024-03-08 RX ORDER — ATORVASTATIN CALCIUM 20 MG/1
20 TABLET, FILM COATED ORAL DAILY
Status: DISCONTINUED | OUTPATIENT
Start: 2024-03-08 | End: 2024-03-13 | Stop reason: HOSPADM

## 2024-03-08 RX ORDER — METOPROLOL TARTRATE 1 MG/ML
5 INJECTION, SOLUTION INTRAVENOUS ONCE
Status: COMPLETED | OUTPATIENT
Start: 2024-03-08 | End: 2024-03-08

## 2024-03-08 RX ORDER — MORPHINE SULFATE 2 MG/ML
2 INJECTION, SOLUTION INTRAMUSCULAR; INTRAVENOUS EVERY 4 HOURS PRN
Status: DISCONTINUED | OUTPATIENT
Start: 2024-03-08 | End: 2024-03-12

## 2024-03-08 RX ORDER — METOPROLOL TARTRATE 25 MG/1
25 TABLET, FILM COATED ORAL 2 TIMES DAILY
Status: DISCONTINUED | OUTPATIENT
Start: 2024-03-08 | End: 2024-03-12

## 2024-03-08 RX ORDER — ONDANSETRON HYDROCHLORIDE 2 MG/ML
4 INJECTION, SOLUTION INTRAVENOUS EVERY 8 HOURS PRN
Status: DISCONTINUED | OUTPATIENT
Start: 2024-03-08 | End: 2024-03-13 | Stop reason: HOSPADM

## 2024-03-08 RX ORDER — SODIUM CHLORIDE 9 MG/ML
50 INJECTION, SOLUTION INTRAVENOUS CONTINUOUS
Status: DISCONTINUED | OUTPATIENT
Start: 2024-03-08 | End: 2024-03-13 | Stop reason: HOSPADM

## 2024-03-08 RX ORDER — MORPHINE SULFATE 4 MG/ML
4 INJECTION, SOLUTION INTRAMUSCULAR; INTRAVENOUS ONCE
Status: COMPLETED | OUTPATIENT
Start: 2024-03-08 | End: 2024-03-08

## 2024-03-08 RX ORDER — METOPROLOL TARTRATE 1 MG/ML
INJECTION, SOLUTION INTRAVENOUS
Status: COMPLETED
Start: 2024-03-08 | End: 2024-03-08

## 2024-03-08 RX ORDER — PHYTONADIONE 5 MG/1
2.5 TABLET ORAL DAILY
Status: DISCONTINUED | OUTPATIENT
Start: 2024-03-08 | End: 2024-03-09

## 2024-03-08 RX ADMIN — METOPROLOL TARTRATE 5 MG: 5 INJECTION INTRAVENOUS at 22:12

## 2024-03-08 RX ADMIN — MORPHINE SULFATE 4 MG: 4 INJECTION, SOLUTION INTRAMUSCULAR; INTRAVENOUS at 02:47

## 2024-03-08 RX ADMIN — PHYTONADIONE 2.5 MG: 5 TABLET ORAL at 18:31

## 2024-03-08 RX ADMIN — MORPHINE SULFATE 2 MG: 2 INJECTION, SOLUTION INTRAMUSCULAR; INTRAVENOUS at 13:35

## 2024-03-08 RX ADMIN — SODIUM CHLORIDE 50 ML/HR: 9 INJECTION, SOLUTION INTRAVENOUS at 08:23

## 2024-03-08 RX ADMIN — MORPHINE SULFATE 2 MG: 2 INJECTION, SOLUTION INTRAMUSCULAR; INTRAVENOUS at 09:00

## 2024-03-08 RX ADMIN — PHYTONADIONE 1 MG: 10 INJECTION, EMULSION INTRAMUSCULAR; INTRAVENOUS; SUBCUTANEOUS at 10:35

## 2024-03-08 RX ADMIN — MORPHINE SULFATE 2 MG: 2 INJECTION, SOLUTION INTRAMUSCULAR; INTRAVENOUS at 23:59

## 2024-03-08 RX ADMIN — MORPHINE SULFATE 2 MG: 2 INJECTION, SOLUTION INTRAMUSCULAR; INTRAVENOUS at 17:09

## 2024-03-08 SDOH — SOCIAL STABILITY: SOCIAL INSECURITY: WERE YOU ABLE TO COMPLETE ALL THE BEHAVIORAL HEALTH SCREENINGS?: YES

## 2024-03-08 SDOH — SOCIAL STABILITY: SOCIAL INSECURITY: HAVE YOU HAD THOUGHTS OF HARMING ANYONE ELSE?: NO

## 2024-03-08 SDOH — SOCIAL STABILITY: SOCIAL INSECURITY: HAS ANYONE EVER THREATENED TO HURT YOUR FAMILY OR YOUR PETS?: NO

## 2024-03-08 SDOH — SOCIAL STABILITY: SOCIAL INSECURITY: ABUSE: ADULT

## 2024-03-08 SDOH — SOCIAL STABILITY: SOCIAL INSECURITY: DOES ANYONE TRY TO KEEP YOU FROM HAVING/CONTACTING OTHER FRIENDS OR DOING THINGS OUTSIDE YOUR HOME?: NO

## 2024-03-08 SDOH — SOCIAL STABILITY: SOCIAL INSECURITY: DO YOU FEEL UNSAFE GOING BACK TO THE PLACE WHERE YOU ARE LIVING?: NO

## 2024-03-08 SDOH — SOCIAL STABILITY: SOCIAL INSECURITY: ARE YOU OR HAVE YOU BEEN THREATENED OR ABUSED PHYSICALLY, EMOTIONALLY, OR SEXUALLY BY ANYONE?: NO

## 2024-03-08 SDOH — SOCIAL STABILITY: SOCIAL INSECURITY: DO YOU FEEL ANYONE HAS EXPLOITED OR TAKEN ADVANTAGE OF YOU FINANCIALLY OR OF YOUR PERSONAL PROPERTY?: NO

## 2024-03-08 SDOH — SOCIAL STABILITY: SOCIAL INSECURITY: ARE THERE ANY APPARENT SIGNS OF INJURIES/BEHAVIORS THAT COULD BE RELATED TO ABUSE/NEGLECT?: NO

## 2024-03-08 ASSESSMENT — COGNITIVE AND FUNCTIONAL STATUS - GENERAL
DAILY ACTIVITIY SCORE: 15
TOILETING: A LOT
CLIMB 3 TO 5 STEPS WITH RAILING: A LOT
DRESSING REGULAR LOWER BODY CLOTHING: A LOT
STANDING UP FROM CHAIR USING ARMS: A LOT
CLIMB 3 TO 5 STEPS WITH RAILING: A LOT
PATIENT BASELINE BEDBOUND: NO
WALKING IN HOSPITAL ROOM: A LOT
WALKING IN HOSPITAL ROOM: A LOT
MOVING FROM LYING ON BACK TO SITTING ON SIDE OF FLAT BED WITH BEDRAILS: A LOT
HELP NEEDED FOR BATHING: A LOT
MOBILITY SCORE: 13
MOBILITY SCORE: 12
TURNING FROM BACK TO SIDE WHILE IN FLAT BAD: A LOT
DAILY ACTIVITIY SCORE: 21
STANDING UP FROM CHAIR USING ARMS: A LOT
TOILETING: A LOT
MOVING TO AND FROM BED TO CHAIR: A LOT
PERSONAL GROOMING: A LOT
DRESSING REGULAR UPPER BODY CLOTHING: A LITTLE
PERSONAL GROOMING: A LOT
TURNING FROM BACK TO SIDE WHILE IN FLAT BAD: A LOT
CLIMB 3 TO 5 STEPS WITH RAILING: A LOT
TOILETING: A LITTLE
WALKING IN HOSPITAL ROOM: A LOT
HELP NEEDED FOR BATHING: A LOT
MOVING TO AND FROM BED TO CHAIR: A LOT
DRESSING REGULAR LOWER BODY CLOTHING: A LOT
DRESSING REGULAR UPPER BODY CLOTHING: A LITTLE
DRESSING REGULAR LOWER BODY CLOTHING: A LOT
MOVING FROM LYING ON BACK TO SITTING ON SIDE OF FLAT BED WITH BEDRAILS: A LITTLE
DAILY ACTIVITIY SCORE: 15
TURNING FROM BACK TO SIDE WHILE IN FLAT BAD: A LOT
MOVING TO AND FROM BED TO CHAIR: A LOT
MOVING FROM LYING ON BACK TO SITTING ON SIDE OF FLAT BED WITH BEDRAILS: A LITTLE

## 2024-03-08 ASSESSMENT — PAIN DESCRIPTION - LOCATION
LOCATION: HIP

## 2024-03-08 ASSESSMENT — LIFESTYLE VARIABLES
HOW OFTEN DO YOU HAVE 6 OR MORE DRINKS ON ONE OCCASION: NEVER
HOW OFTEN DO YOU HAVE A DRINK CONTAINING ALCOHOL: NEVER
SKIP TO QUESTIONS 9-10: 1
AUDIT-C TOTAL SCORE: 0
HOW MANY STANDARD DRINKS CONTAINING ALCOHOL DO YOU HAVE ON A TYPICAL DAY: PATIENT DOES NOT DRINK
AUDIT-C TOTAL SCORE: 0

## 2024-03-08 ASSESSMENT — PAIN SCALES - GENERAL
PAINLEVEL_OUTOF10: 0 - NO PAIN
PAINLEVEL_OUTOF10: 3
PAINLEVEL_OUTOF10: 0 - NO PAIN
PAINLEVEL_OUTOF10: 5 - MODERATE PAIN
PAINLEVEL_OUTOF10: 6
PAINLEVEL_OUTOF10: 4
PAINLEVEL_OUTOF10: 3
PAINLEVEL_OUTOF10: 6
PAINLEVEL_OUTOF10: 5 - MODERATE PAIN

## 2024-03-08 ASSESSMENT — PATIENT HEALTH QUESTIONNAIRE - PHQ9
SUM OF ALL RESPONSES TO PHQ9 QUESTIONS 1 & 2: 0
1. LITTLE INTEREST OR PLEASURE IN DOING THINGS: NOT AT ALL
2. FEELING DOWN, DEPRESSED OR HOPELESS: NOT AT ALL

## 2024-03-08 ASSESSMENT — ACTIVITIES OF DAILY LIVING (ADL)
GROOMING: INDEPENDENT
LACK_OF_TRANSPORTATION: NO
LACK_OF_TRANSPORTATION: NO
TOILETING: NEEDS ASSISTANCE
HEARING - RIGHT EAR: FUNCTIONAL
BATHING: NEEDS ASSISTANCE
PATIENT'S MEMORY ADEQUATE TO SAFELY COMPLETE DAILY ACTIVITIES?: YES
WALKS IN HOME: NEEDS ASSISTANCE
JUDGMENT_ADEQUATE_SAFELY_COMPLETE_DAILY_ACTIVITIES: YES
ADEQUATE_TO_COMPLETE_ADL: YES
FEEDING YOURSELF: INDEPENDENT
HEARING - LEFT EAR: FUNCTIONAL
DRESSING YOURSELF: NEEDS ASSISTANCE

## 2024-03-08 ASSESSMENT — PAIN - FUNCTIONAL ASSESSMENT
PAIN_FUNCTIONAL_ASSESSMENT: 0-10

## 2024-03-08 ASSESSMENT — PAIN DESCRIPTION - ORIENTATION
ORIENTATION: LEFT

## 2024-03-08 ASSESSMENT — PAIN SCALES - WONG BAKER
WONGBAKER_NUMERICALRESPONSE: HURTS WHOLE LOT
WONGBAKER_NUMERICALRESPONSE: HURTS WHOLE LOT
WONGBAKER_NUMERICALRESPONSE: NO HURT

## 2024-03-08 NOTE — PROGRESS NOTES
03/08/24 1249   [facility] Disability Status   Are you deaf or do you have serious difficulty hearing? N   Are you blind or do you have serious difficulty seeing, even when wearing glasses? N   Because of a physical, mental, or emotional condition, do you have serious difficulty concentrating, remembering, or making decisions? (5 years old or older) N   Do you have serious difficulty walking or climbing stairs? N   Do you have serious difficulty dressing or bathing? N   Because of a physical, mental, or emotional condition, do you have serious difficulty doing errands alone such as visiting the doctor? N

## 2024-03-08 NOTE — H&P
History Of Present Illness  José Barth is a 79 y.o. male presenting with fall..  Patient transferred from [facility] emergency room.  This is a 79-year-old gentleman with history of hypertension, hyperlipidemia and chronic atrial fibrillation on Coumadin and atorvastatin who had an accidental fall while picking up his mail on his driveway and fell on his left buttock and left thumb and subsequently got a laceration of the right thumb and left hip periprosthetic fracture.  Patient was sent from [facility] to [facility] this morning for orthopedic consultation and surgical intervention.  Patient does complain of significant pain in the left hip area and inability to ambulate.  He remains in atrial fibrillation with controlled ventricular rate.  His INR is 2.4 and 2.5.  He denies any chest pain palpitation or shortness of breath.  He has always had otherwise a good health all along.  Patient has been given IV vitamin K 1 mg today to reverse her INR which will be rechecked in couple of hours.     Past Medical History  He has a past medical history of Personal history of other diseases of the circulatory system and Personal history of other specified conditions.    Surgical History  He has a past surgical history that includes Other surgical history (10/29/2021).     Social History  He reports that he has never smoked. He has never used smokeless tobacco. No history on file for alcohol use and drug use.    Family History  Family History   Problem Relation Name Age of Onset    Heart attack Father          Allergies  Patient has no known allergies.    Review of Systems  COMPLETE REVIEW OF SYSTEMS:    GENERAL: No weight loss, malaise or fevers., SEE HPI  HEENT: Negative for frequent or significant headaches, No changes in hearing or vision, no nose bleeds or other nasal problems  NECK: Negative for lumps, goiter, pain and significant neck swelling  RESPIRATORY: Negative for cough,  wheezing or shortness of breath.  CARDIOVASCULAR: History of atrial fibrillation, controlled rate, no palpitation.  Negative for chest pain, leg swelling .  GI: Negative for abdominal discomfort, blood in stools or black stools or change in bowel habits  : No history of dysuria, frequency or incontinence  MUSCULOSKELETAL: See HPI, previous left hip replacement, accidental fall leading to left hip periprosthetic fracture.    SKIN: Negative for lesions, rash, and itching.  PSYCH: Negative for sleep disturbance, mood disorder and recent psychosocial stressors.  HEMATOLOGY/LYMPHOLOGY: Negative for prolonged bleeding, bruising easily or swollen nodes.  ENDOCRINE: Negative for cold or heat intolerance, polyuria, polydipsia and goiter.  NEURO: No history of headaches, syncope, paralysis, seizures or tremors  All other reviewed and negative other than HPI.         Physical Exam  GENERAL: healthy, alert, moderate distress, cooperative  SKIN: Skin color, texture, turgor normal. No rashes or lesions.  HEAD/SINUSES: No significant findings.  EYES: PERRLA and EOMI  EARS: external ears normal.  NOSE:  Septum midline.  OROPHARYNX: Lips, mucosa, and tongue normal. Teeth and gums normal. Oropharynx normal.  NECK: no jugulovenous distention, no carotid bruits, carotid pulse normal contour, supple  BACK: Back symmetric, Normal curvature, ROM normal, No CVAT.  LUNGS: Lungs clear to auscultation. Good diaphragmatic excursion.  CARDIAC: Remains in atrial fibrillation with controlled ventricular rate, no acute ischemic changes, normal S1 and S2; no rubs,  or gallops, 1/6 systolic ejection murmur left sternal border, no CHF.  ABDOMEN: Abdomen soft, non-tender. BS normal. No masses or organomegaly.  Musculoskeletal status post left hip periprosthetic fracture and tenderness of left hip.  EXTREMITIES: Extremities normal. No deformities, edema, clubbing or skin discoloration.  NEURO: No neurological deficit.  Reflexes normal and symmetric.  Sensation grossly intact., Cranial nerves II-XII intact  PULSES: 2+ radial, 2+ carotid  RECTAL: not done       Last Recorded Vitals  /57 (BP Location: Left arm, Patient Position: Lying)   Pulse (!) 112   Temp 36.9 °C (98.4 °F) (Temporal)   Resp 19   SpO2 93%     Relevant Results    Scheduled medications  [Held by provider] atorvastatin, 20 mg, oral, Daily  [Held by provider] metoprolol tartrate, 25 mg, oral, BID      Continuous medications  sodium chloride 0.9%, 50 mL/hr, Last Rate: 50 mL/hr (03/08/24 0823)      PRN medications  PRN medications: HYDROmorphone, morphine, ondansetron     Results for orders placed or performed during the hospital encounter of 03/08/24 (from the past 96 hour(s))   CBC   Result Value Ref Range    WBC 8.0 4.4 - 11.3 x10*3/uL    nRBC 0.0 0.0 - 0.0 /100 WBCs    RBC 3.59 (L) 4.50 - 5.90 x10*6/uL    Hemoglobin 12.0 (L) 13.5 - 17.5 g/dL    Hematocrit 35.4 (L) 41.0 - 52.0 %    MCV 99 80 - 100 fL    MCH 33.4 26.0 - 34.0 pg    MCHC 33.9 32.0 - 36.0 g/dL    RDW 13.0 11.5 - 14.5 %    Platelets 107 (L) 150 - 450 x10*3/uL   Comprehensive metabolic panel   Result Value Ref Range    Glucose 107 (H) 74 - 99 mg/dL    Sodium 137 136 - 145 mmol/L    Potassium 4.2 3.5 - 5.3 mmol/L    Chloride 105 98 - 107 mmol/L    Bicarbonate 24 21 - 32 mmol/L    Anion Gap 12 10 - 20 mmol/L    Urea Nitrogen 26 (H) 6 - 23 mg/dL    Creatinine 0.95 0.50 - 1.30 mg/dL    eGFR 81 >60 mL/min/1.73m*2    Calcium 8.3 (L) 8.6 - 10.3 mg/dL    Albumin 3.3 (L) 3.4 - 5.0 g/dL    Alkaline Phosphatase 67 33 - 136 U/L    Total Protein 5.1 (L) 6.4 - 8.2 g/dL    AST 18 9 - 39 U/L    Bilirubin, Total 2.0 (H) 0.0 - 1.2 mg/dL    ALT 19 10 - 52 U/L   Protime-INR   Result Value Ref Range    Protime 28.8 (H) 9.8 - 12.8 seconds    INR 2.5 (H) 0.9 - 1.1   ECG 12 lead   Result Value Ref Range    Ventricular Rate 98 BPM    Atrial Rate 234 BPM    QRS Duration 74 ms    QT Interval 354 ms    QTC Calculation(Bazett) 451 ms    R Axis -14 degrees     T Axis -9 degrees    QRS Count 16 beats    Q Onset 228 ms    T Offset 405 ms    QTC Fredericia 417 ms        ECG 12 lead    Result Date: 3/8/2024  Atrial fibrillation Abnormal ECG When compared with ECG of 21-OCT-2022 11:11, No significant change was found    CT pelvis wo IV contrast    Result Date: 3/8/2024  Interpreted By:  Desiree Lewis, STUDY: CT PELVIS WO IV CONTRAST; ;  3/8/2024 1:24 am   INDICATION: Signs/Symptoms:orthopedic fractures.   COMPARISON: Correlation with CT left hip and CT femur 03/07/2024   ACCESSION NUMBER(S): [id number]   ORDERING CLINICIAN: TYLER ZAMORANO   TECHNIQUE: Noncontrast CT images of the pelvis with axial, sagittal and coronal reconstructed images.   FINDINGS: Acute nondisplaced fractures of the left superior and inferior pubic rami are again noted, no significant change in alignment. There is also an acute nondisplaced fracture of the left sacral ala, not included on prior imaging. No sacroiliac or pubic symphysis diastasis.   Left total hip arthroplasty and the known left femur periprosthetic fracture are partially imaged. Mild to moderate right hip osteoarthrosis noted. Moderate to severe degenerative disc changes at L3-4, L4-5 and L5-S1.   Mild edema/hematoma adjacent to the left inferior pubic ramus. Visualized intrapelvic structures are unremarkable.       Acute nondisplaced left superior and inferior pubic rami fractures. Acute nondisplaced left sacral ala fracture. No sacroiliac or pubic symphysis diastasis.   Partially imaged known left periprosthetic femur fracture.     MACRO: None   Signed by: Desiree Lewis 3/8/2024 1:46 AM Dictation workstation:   [id number]    CT hip left wo IV contrast    Result Date: 3/7/2024  Interpreted By:  Desiree Lewis, STUDY: CT HIP LEFT WO IV CONTRAST; CT FEMUR LEFT WO IV CONTRAST; ;  3/7/2024 5:55 pm   INDICATION: Signs/Symptoms:eval L femur fracture; Signs/Symptoms:eval femur fracture.   COMPARISON: Radiographs of the same day    ACCESSION NUMBER(S): [id number]; [id number]   ORDERING CLINICIAN: BELEN PEREZ   TECHNIQUE: Noncontrast CT images of the left hip and left femur with axial, sagittal and coronal reconstructed images. Metal artifact reduction technique was used.   FINDINGS: Left total hip arthroplasty is again noted. Acute periprosthetic femur fracture is again noted. The proximal extent of the fracture is just distal to the greater trochanter, the fracture line extends to the medial femoral diaphysis, 2-3 cm proximal to the femoral stem tip. There is mild comminution, with a not significantly displaced posterior butterfly fragment. There is 2 cm medial to lateral and anterior to posterior displacement of the dominant fracture fragments. The knee is externally rotated.   There are acute appearing nondisplaced left superior and inferior pubic rami fractures.   Subchondral cyst noted in the left acetabulum.   Mild tricompartmental osteoarthrosis of the knee.   There is edema/hematoma in the proximal to mid anterior compartment of the thigh. Mild hematoma around the left inferior pubic ramus. Visualized intrapelvic structures are unremarkable.       Acute, displaced left periprosthetic proximal femur fracture.   Acute, nondisplaced left pubic rami fractures.   Edema/hematoma in the anterior compartment of the thigh and adjacent to the inferior pubic ramus.     MACRO: None   Signed by: Desiree Lewis 3/7/2024 7:06 PM Dictation workstation:   [id number]    CT femur left wo IV contrast    Result Date: 3/7/2024  Interpreted By:  Desiree Lewis, STUDY: CT HIP LEFT WO IV CONTRAST; CT FEMUR LEFT WO IV CONTRAST; ;  3/7/2024 5:55 pm   INDICATION: Signs/Symptoms:eval L femur fracture; Signs/Symptoms:eval femur fracture.   COMPARISON: Radiographs of the same day   ACCESSION NUMBER(S): [id number]; [id number]   ORDERING CLINICIAN: BELEN PEREZ   TECHNIQUE: Noncontrast CT images of the left hip and left femur with axial, sagittal and  coronal reconstructed images. Metal artifact reduction technique was used.   FINDINGS: Left total hip arthroplasty is again noted. Acute periprosthetic femur fracture is again noted. The proximal extent of the fracture is just distal to the greater trochanter, the fracture line extends to the medial femoral diaphysis, 2-3 cm proximal to the femoral stem tip. There is mild comminution, with a not significantly displaced posterior butterfly fragment. There is 2 cm medial to lateral and anterior to posterior displacement of the dominant fracture fragments. The knee is externally rotated.   There are acute appearing nondisplaced left superior and inferior pubic rami fractures.   Subchondral cyst noted in the left acetabulum.   Mild tricompartmental osteoarthrosis of the knee.   There is edema/hematoma in the proximal to mid anterior compartment of the thigh. Mild hematoma around the left inferior pubic ramus. Visualized intrapelvic structures are unremarkable.       Acute, displaced left periprosthetic proximal femur fracture.   Acute, nondisplaced left pubic rami fractures.   Edema/hematoma in the anterior compartment of the thigh and adjacent to the inferior pubic ramus.     MACRO: None   Signed by: Desiree Lewis 3/7/2024 7:06 PM Dictation workstation:   [id number]    XR shoulder left 2+ views    Result Date: 3/7/2024  Interpreted By:  Dave Hatfield, STUDY: XR SHOULDER LEFT 2+ VIEWS; ;  3/7/2024 2:11 pm   INDICATION: Signs/Symptoms:trauma.   COMPARISON: None.   ACCESSION NUMBER(S): [id number]   ORDERING CLINICIAN: PREETHI RICHARD   FINDINGS: No acute fracture or glenohumeral dislocation.No acromioclavicular seperation.Mild acromioclavicular degenerative changes.       No acute osseous abnormality left shoulder.     MACRO: None   Signed by: Dave Hatfield 3/7/2024 2:22 PM Dictation workstation:   [id number]    XR femur left 2+ views    Result Date: 3/7/2024  Interpreted By:  Dave Hatfield, STUDY: XR PELVIS 1-2  VIEWS; XR FEMUR LEFT 2+ VIEWS; ;  3/7/2024 2:11 pm   INDICATION: Signs/Symptoms:trauma.   COMPARISON: 04/26/2023   ACCESSION NUMBER(S): [id number]; [id number]   ORDERING CLINICIAN: BELEN RICHARD   FINDINGS: Frontal view the pelvis and four views of the left femur. There is an obliquely oriented, displaced periprosthetic fracture traversing region of patient's femoral stem component of left hip arthroplasty. No dislocation. Imaged distal femur appears intact.       Acute, obliquely oriented periprosthetic fracture of the left proximal femur across region of femoral stem component of left hip arthroplasty.     MACRO: None   Signed by: Dave Hatfield 3/7/2024 2:21 PM Dictation workstation:   [id number]    XR pelvis 1-2 views    Result Date: 3/7/2024  Interpreted By:  Dave Hatfield, STUDY: XR PELVIS 1-2 VIEWS; XR FEMUR LEFT 2+ VIEWS; ;  3/7/2024 2:11 pm   INDICATION: Signs/Symptoms:trauma.   COMPARISON: 04/26/2023   ACCESSION NUMBER(S): [id number]; [id number]   ORDERING CLINICIAN: BELEN RICHARD   FINDINGS: Frontal view the pelvis and four views of the left femur. There is an obliquely oriented, displaced periprosthetic fracture traversing region of patient's femoral stem component of left hip arthroplasty. No dislocation. Imaged distal femur appears intact.       Acute, obliquely oriented periprosthetic fracture of the left proximal femur across region of femoral stem component of left hip arthroplasty.     MACRO: None   Signed by: Dave Hatfield 3/7/2024 2:21 PM Dictation workstation:   [id number]       Assessment/Plan   Principal Problem:    Closed left hip fracture, initial encounter (CMS/[facility])  Patient transferred from [facility] emergency room.  Orthopedic consult sought  Pain management and IV fluids and possible surgical intervention once INR is 1.5 or below.    Active problems  Atrial fibrillation with controlled ventricular rate  Hold Coumadin, INR is 2.5,  hold p.o. metoprolol and use IV metoprolol if necessary if heart rate is greater than 120 bpm.  Patient received IV vitamin K 1 mg to reverse his INR and will repeat INR in couple of hours.    Hypertension stable blood pressures are rather low normal.  Hyperlipidemia patient takes atorvastatin at home which will be resumed after surgery.    Reviewed all labs and imaging studies and discussed the plan of treatment with patient in detail.      Total time spent in examination counseling coordinating care for this patient was greater than 75 minutes.       Kris Kuhn MD

## 2024-03-08 NOTE — PROGRESS NOTES
03/08/24 1215   Discharge Planning   Living Arrangements Spouse/significant other   Support Systems Spouse/significant other   Assistance Needed Independent   Type of Residence Private residence   Number of Stairs to Enter Residence 2   Number of Stairs Within Residence 14   Do you have animals or pets at home? No   Who is requesting discharge planning? Patient   Home or Post Acute Services Post acute facilities (Rehab/SNF/etc)   Type of Post Acute Facility Services Skilled nursing   Patient expects to be discharged to: Possible SNF   Does the patient need discharge transport arranged? Yes   RoundTrip coordination needed? Yes   Financial Resource Strain   How hard is it for you to pay for the very basics like food, housing, medical care, and heating? Not hard   Housing Stability   In the last 12 months, was there a time when you were not able to pay the mortgage or rent on time? N   In the last 12 months, how many places have you lived? 1   In the last 12 months, was there a time when you did not have a steady place to sleep or slept in a shelter (including now)? N   Transportation Needs   In the past 12 months, has lack of transportation kept you from medical appointments or from getting medications? no   In the past 12 months, has lack of transportation kept you from meetings, work, or from getting things needed for daily living? No   Patient Choice   Provider Choice list and CMS website (https://medicare.gov/care-compare#search) for post-acute Quality and Resource Measure Data were provided and reviewed with: Patient   Patient / Family choosing to utilize agency / facility established prior to hospitalization Yes     Met with patient at the bedside to discuss discharge plan.  He lives with his wife and was independent with his care including driving.  Patient goes to the coumadin clinic at [facility].  PLAN: Ortho consult for periprosthetic hip fx, Vitamin K due to elevated INR  DISP: potential SNF, asked  PCN to provide choices, will need insurance auth  ADOD: 3-4 days  Millie Levy RN

## 2024-03-08 NOTE — CARE PLAN
Problem: Pain  Goal: My pain/discomfort is manageable  Outcome: Progressing     Problem: Safety  Goal: Patient will be injury free during hospitalization  Outcome: Progressing  Goal: I will remain free of falls  Outcome: Progressing     Problem: Daily Care  Goal: Daily care needs are met  Outcome: Progressing     Problem: Psychosocial Needs  Goal: Demonstrates ability to cope with hospitalization/illness  Outcome: Progressing  Goal: Collaborate with me, my family, and caregiver to identify my specific goals  Outcome: Progressing  Flowsheets (Taken 3/8/2024 6242)  Cultural Requests During Hospitalization: none  Spiritual Requests During Hospitalization: none     Problem: Discharge Barriers  Goal: My discharge needs are met  Outcome: Progressing      No

## 2024-03-08 NOTE — PROGRESS NOTES
Pharmacy Medication History Review    José Barth is a 79 y.o. male admitted for Closed left hip fracture, initial encounter (CMS/[facility]). Pharmacy reviewed the patient's [id number] medications and allergies for accuracy.    The list below reflectives the updated PTA list. Please review each medication in order reconciliation for additional clarification and justification.       The list below reflectives the updated allergy list. Please review each documented allergy for additional clarification and justification.  Allergies  Reviewed by Kris Kuhn MD on 3/8/2024   No Known Allergies         Below are additional concerns with the patient's PTA list.  Prior to Admission Medications   Prescriptions Last Dose Informant   acetaminophen (TylenoL) 325 mg capsule Unknown    Sig: Take 1 capsule (325 mg) by mouth once daily.   atorvastatin (Lipitor) 20 mg tablet 3/7/2024    Sig: TAKE 1 TABLET BY MOUTH ONCE  DAILY   metoprolol tartrate (Lopressor) 25 mg tablet 3/7/2024    Sig: TAKE 1 TABLET BY MOUTH TWICE  DAILY   warfarin (Coumadin) 4 mg tablet Past Week    Sig: Take 1 tablet (4mg) by mouth once daily EXCEPT 0.5 tablet (2mg) by mouth on Wednesdays only or as directed per  [facility]      Facility-Administered Medications: None        Anne Lemus

## 2024-03-08 NOTE — CARE PLAN
The patient's goals for the shift include      The clinical goals for the shift include pain  control    Problem: Pain  Goal: My pain/discomfort is manageable  Outcome: Progressing     Problem: Safety  Goal: Patient will be injury free during hospitalization  Outcome: Progressing  Goal: I will remain free of falls  Outcome: Progressing     Problem: Daily Care  Goal: Daily care needs are met  Outcome: Progressing     Problem: Psychosocial Needs  Goal: Demonstrates ability to cope with hospitalization/illness  Outcome: Progressing  Goal: Collaborate with me, my family, and caregiver to identify my specific goals  Outcome: Progressing     Problem: Discharge Barriers  Goal: My discharge needs are met  Outcome: Progressing

## 2024-03-09 ENCOUNTER — APPOINTMENT (OUTPATIENT)
Dept: CARDIOLOGY | Facility: HOSPITAL | Age: 79
DRG: 467 | End: 2024-03-09
Payer: MEDICARE

## 2024-03-09 LAB
ANION GAP SERPL CALC-SCNC: 11 MMOL/L (ref 10–20)
BUN SERPL-MCNC: 21 MG/DL (ref 6–23)
CALCIUM SERPL-MCNC: 7.5 MG/DL (ref 8.6–10.3)
CHLORIDE SERPL-SCNC: 105 MMOL/L (ref 98–107)
CO2 SERPL-SCNC: 24 MMOL/L (ref 21–32)
CREAT SERPL-MCNC: 0.93 MG/DL (ref 0.5–1.3)
CRP SERPL-MCNC: 13.09 MG/DL
EGFRCR SERPLBLD CKD-EPI 2021: 83 ML/MIN/1.73M*2
ERYTHROCYTE [DISTWIDTH] IN BLOOD BY AUTOMATED COUNT: 12.9 % (ref 11.5–14.5)
ERYTHROCYTE [SEDIMENTATION RATE] IN BLOOD BY WESTERGREN METHOD: 6 MM/H (ref 0–20)
GLUCOSE SERPL-MCNC: 95 MG/DL (ref 74–99)
HCT VFR BLD AUTO: 34.1 % (ref 41–52)
HGB BLD-MCNC: 11.5 G/DL (ref 13.5–17.5)
INR PPP: 1.3 (ref 0.9–1.1)
MCH RBC QN AUTO: 33.9 PG (ref 26–34)
MCHC RBC AUTO-ENTMCNC: 33.7 G/DL (ref 32–36)
MCV RBC AUTO: 101 FL (ref 80–100)
NRBC BLD-RTO: 0 /100 WBCS (ref 0–0)
PLATELET # BLD AUTO: 92 X10*3/UL (ref 150–450)
POTASSIUM SERPL-SCNC: 4.1 MMOL/L (ref 3.5–5.3)
PROTHROMBIN TIME: 14.8 SECONDS (ref 9.8–12.8)
RBC # BLD AUTO: 3.39 X10*6/UL (ref 4.5–5.9)
SODIUM SERPL-SCNC: 136 MMOL/L (ref 136–145)
WBC # BLD AUTO: 8 X10*3/UL (ref 4.4–11.3)

## 2024-03-09 PROCEDURE — 85652 RBC SED RATE AUTOMATED: CPT | Performed by: STUDENT IN AN ORGANIZED HEALTH CARE EDUCATION/TRAINING PROGRAM

## 2024-03-09 PROCEDURE — 2500000005 HC RX 250 GENERAL PHARMACY W/O HCPCS

## 2024-03-09 PROCEDURE — 2500000001 HC RX 250 WO HCPCS SELF ADMINISTERED DRUGS (ALT 637 FOR MEDICARE OP): Performed by: NURSE PRACTITIONER

## 2024-03-09 PROCEDURE — 80048 BASIC METABOLIC PNL TOTAL CA: CPT | Performed by: NURSE PRACTITIONER

## 2024-03-09 PROCEDURE — 2500000004 HC RX 250 GENERAL PHARMACY W/ HCPCS (ALT 636 FOR OP/ED): Performed by: INTERNAL MEDICINE

## 2024-03-09 PROCEDURE — 86140 C-REACTIVE PROTEIN: CPT | Performed by: STUDENT IN AN ORGANIZED HEALTH CARE EDUCATION/TRAINING PROGRAM

## 2024-03-09 PROCEDURE — 1200000002 HC GENERAL ROOM WITH TELEMETRY DAILY

## 2024-03-09 PROCEDURE — 85027 COMPLETE CBC AUTOMATED: CPT | Performed by: NURSE PRACTITIONER

## 2024-03-09 PROCEDURE — 36415 COLL VENOUS BLD VENIPUNCTURE: CPT | Performed by: NURSE PRACTITIONER

## 2024-03-09 PROCEDURE — 93010 ELECTROCARDIOGRAM REPORT: CPT | Performed by: INTERNAL MEDICINE

## 2024-03-09 PROCEDURE — 99232 SBSQ HOSP IP/OBS MODERATE 35: CPT | Performed by: INTERNAL MEDICINE

## 2024-03-09 PROCEDURE — 93005 ELECTROCARDIOGRAM TRACING: CPT

## 2024-03-09 PROCEDURE — 85610 PROTHROMBIN TIME: CPT | Performed by: NURSE PRACTITIONER

## 2024-03-09 RX ORDER — ENOXAPARIN SODIUM 150 MG/ML
1.5 INJECTION SUBCUTANEOUS EVERY 24 HOURS
Status: DISCONTINUED | OUTPATIENT
Start: 2024-03-09 | End: 2024-03-10

## 2024-03-09 RX ORDER — METOPROLOL TARTRATE 1 MG/ML
INJECTION, SOLUTION INTRAVENOUS
Status: COMPLETED
Start: 2024-03-09 | End: 2024-03-09

## 2024-03-09 RX ORDER — METOPROLOL TARTRATE 1 MG/ML
5 INJECTION, SOLUTION INTRAVENOUS ONCE
Status: COMPLETED | OUTPATIENT
Start: 2024-03-09 | End: 2024-03-09

## 2024-03-09 RX ADMIN — SODIUM CHLORIDE 50 ML/HR: 9 INJECTION, SOLUTION INTRAVENOUS at 03:45

## 2024-03-09 RX ADMIN — MORPHINE SULFATE 2 MG: 2 INJECTION, SOLUTION INTRAMUSCULAR; INTRAVENOUS at 16:48

## 2024-03-09 RX ADMIN — ENOXAPARIN SODIUM 105 MG: 120 INJECTION SUBCUTANEOUS at 12:05

## 2024-03-09 RX ADMIN — MORPHINE SULFATE 2 MG: 2 INJECTION, SOLUTION INTRAMUSCULAR; INTRAVENOUS at 20:33

## 2024-03-09 RX ADMIN — MORPHINE SULFATE 2 MG: 2 INJECTION, SOLUTION INTRAMUSCULAR; INTRAVENOUS at 10:12

## 2024-03-09 RX ADMIN — HYDROMORPHONE HYDROCHLORIDE 0.4 MG: 1 INJECTION, SOLUTION INTRAMUSCULAR; INTRAVENOUS; SUBCUTANEOUS at 22:28

## 2024-03-09 RX ADMIN — METOPROLOL TARTRATE 25 MG: 25 TABLET, FILM COATED ORAL at 09:03

## 2024-03-09 RX ADMIN — METOPROLOL TARTRATE 25 MG: 25 TABLET, FILM COATED ORAL at 20:33

## 2024-03-09 RX ADMIN — METOPROLOL TARTRATE 5 MG: 5 INJECTION, SOLUTION INTRAVENOUS at 18:45

## 2024-03-09 RX ADMIN — METOPROLOL TARTRATE 5 MG: 1 INJECTION, SOLUTION INTRAVENOUS at 18:45

## 2024-03-09 ASSESSMENT — PAIN SCALES - GENERAL
PAINLEVEL_OUTOF10: 0 - NO PAIN
PAINLEVEL_OUTOF10: 2
PAINLEVEL_OUTOF10: 0 - NO PAIN
PAINLEVEL_OUTOF10: 10 - WORST POSSIBLE PAIN
PAINLEVEL_OUTOF10: 6
PAINLEVEL_OUTOF10: 0 - NO PAIN
PAINLEVEL_OUTOF10: 0 - NO PAIN
PAINLEVEL_OUTOF10: 7

## 2024-03-09 ASSESSMENT — PAIN DESCRIPTION - ORIENTATION
ORIENTATION: LEFT
ORIENTATION: LEFT

## 2024-03-09 ASSESSMENT — COGNITIVE AND FUNCTIONAL STATUS - GENERAL
DRESSING REGULAR UPPER BODY CLOTHING: A LITTLE
TURNING FROM BACK TO SIDE WHILE IN FLAT BAD: A LOT
TOILETING: A LOT
WALKING IN HOSPITAL ROOM: TOTAL
DAILY ACTIVITIY SCORE: 15
MOVING TO AND FROM BED TO CHAIR: A LOT
HELP NEEDED FOR BATHING: A LOT
DRESSING REGULAR LOWER BODY CLOTHING: A LOT
MOVING FROM LYING ON BACK TO SITTING ON SIDE OF FLAT BED WITH BEDRAILS: A LITTLE
STANDING UP FROM CHAIR USING ARMS: TOTAL
MOBILITY SCORE: 10
CLIMB 3 TO 5 STEPS WITH RAILING: TOTAL
PERSONAL GROOMING: A LOT

## 2024-03-09 ASSESSMENT — PAIN DESCRIPTION - LOCATION
LOCATION: HIP
LOCATION: HIP

## 2024-03-09 ASSESSMENT — PAIN - FUNCTIONAL ASSESSMENT
PAIN_FUNCTIONAL_ASSESSMENT: 0-10

## 2024-03-09 ASSESSMENT — PAIN SCALES - WONG BAKER: WONGBAKER_NUMERICALRESPONSE: HURTS EVEN MORE

## 2024-03-09 NOTE — PROGRESS NOTES
And PROGRESS NOTE - INTERNAL MEDICINE     PATIENT NAME:  José Barth    MRN:  [id number]  SERVICE DATE:  3/9/2024   SERVICE TIME:  12:56 PM     ADMITTING PHYSICIAN:  Kris Kuhn MD    ASSESSMENT & PLAN:  Principal Problem:    Closed left hip fracture, initial encounter (CMS/Formerly [facility])  Patient transferred from [facility] emergency room.  Orthopedic consult sought  Pain management and IV fluids and possible surgical intervention once INR is 1.5 or below.     Active problems  Atrial fibrillation with controlled ventricular rate  Hold Coumadin, INR is 2.5, hold p.o. metoprolol and use IV metoprolol if necessary if heart rate is greater than 120 bpm.  Patient received IV vitamin K 1 mg to reverse his INR and will repeat INR .  Repeat INR is 1.4 today, will bridge patient with Lovenox 1.5 mg/kg body weight every 24 hours and will discontinue night before surgery.  Patient stated that orthopedics had seen him this morning and is scheduled to have left hip arthroplasty tomorrow.  Therefore Lovenox will be discontinued now.     Hypertension stable blood pressures are rather low normal.  Hyperlipidemia patient takes atorvastatin at home which will be resumed after surgery.     Reviewed all labs and imaging studies and discussed the plan of treatment with patient in detail.  Addendum:  I spoke to Dr. Ruffin, patient is going to go for surgery on Monday, March 11, 2024.  He is medically stable will and cleared for surgery and has moderate surgical risk.        Total time spent in examination counseling coordinating care for this patient was greater than 35 minutes.    Subjective chief complaints: Patient fell on the driveway and had left hip periprosthetic fracture and was seen at [facility] emergency room and then transferred to [facility] on a.m. of March 8, 2024.    Interval history of present illness: Patient seen and examined, he received vitamin K yesterday and this morning his INR is 1.4 and  "is scheduled to undergo surgery for left hip failure prosthetic fracture tomorrow morning on March 10, 2024.  Patient is bridged with Lovenox today.  He is otherwise hemodynamically stable, remains in atrial fibrillation with controlled ventricular rate, no evidence of overt heart failure no chest pain.      MEDICATIONS:   Current Facility-Administered Medications:     [Held by provider] atorvastatin (Lipitor) tablet 20 mg, 20 mg, oral, Daily, Kris Kuhn MD    enoxaparin (Lovenox) syringe 105 mg, 1.5 mg/kg, subcutaneous, q24h, Kris Kuhn MD, 105 mg at 03/09/24 1205    HYDROmorphone (Dilaudid) injection 0.4 mg, 0.4 mg, intravenous, q3h PRN, Kris Kuhn MD    metoprolol tartrate (Lopressor) tablet 25 mg, 25 mg, oral, BID, Chani Garcia, APRN-CNP, 25 mg at 03/09/24 0903    morphine injection 2 mg, 2 mg, intravenous, q4h PRN, Kris Kuhn MD, 2 mg at 03/09/24 1012    ondansetron (Zofran) injection 4 mg, 4 mg, intravenous, q8h PRN, Kris Kuhn MD    sodium chloride 0.9% infusion, 50 mL/hr, intravenous, Continuous, Kris Kuhn MD, Last Rate: 50 mL/hr at 03/09/24 0351, 50 mL/hr at 03/09/24 0351        OBJECTIVE  Visit Vitals  /66 (BP Location: Right arm, Patient Position: Lying)   Pulse 95   Temp 37.2 °C (99 °F) (Oral)   Resp 16   Ht 1.753 m (5' 9.02\")   Wt 72.6 kg (160 lb)   SpO2 100%   BMI 23.62 kg/m²   Smoking Status Never   BSA 1.88 m²      PHYSICAL EXAM:   GENERAL: healthy, alert, moderate distress, cooperative  OROPHARYNX: Lips, mucosa, and tongue normal. Teeth and gums normal. Oropharynx normal.  NECK: no jugulovenous distention, no carotid bruits, carotid pulse normal contour, supple  BACK: Back symmetric, Normal curvature, ROM normal, No CVAT.  LUNGS: Lungs clear to auscultation. Good diaphragmatic excursion.  CARDIAC: Remains in atrial fibrillation with controlled ventricular rate, no acute ischemic changes, normal S1 and S2; no rubs,  or gallops, 1/6 systolic " ejection murmur left sternal border, no CHF.  ABDOMEN: Abdomen soft, non-tender. BS normal. No masses or organomegaly.  Musculoskeletal status post left hip periprosthetic fracture and tenderness of left hip.  EXTREMITIES: Extremities normal. No deformities, edema, clubbing or skin discoloration.  NEURO: No neurological deficit.  Reflexes normal and symmetric. Sensation grossly intact., Cranial nerves II-XII intact  PULSES: 2+ radial, 2+ carotid      DATA:   Diagnostic tests reviewed for today's visit:    Results for orders placed or performed during the hospital encounter of 03/08/24 (from the past 96 hour(s))   CBC   Result Value Ref Range    WBC 8.0 4.4 - 11.3 x10*3/uL    nRBC 0.0 0.0 - 0.0 /100 WBCs    RBC 3.59 (L) 4.50 - 5.90 x10*6/uL    Hemoglobin 12.0 (L) 13.5 - 17.5 g/dL    Hematocrit 35.4 (L) 41.0 - 52.0 %    MCV 99 80 - 100 fL    MCH 33.4 26.0 - 34.0 pg    MCHC 33.9 32.0 - 36.0 g/dL    RDW 13.0 11.5 - 14.5 %    Platelets 107 (L) 150 - 450 x10*3/uL   Comprehensive metabolic panel   Result Value Ref Range    Glucose 107 (H) 74 - 99 mg/dL    Sodium 137 136 - 145 mmol/L    Potassium 4.2 3.5 - 5.3 mmol/L    Chloride 105 98 - 107 mmol/L    Bicarbonate 24 21 - 32 mmol/L    Anion Gap 12 10 - 20 mmol/L    Urea Nitrogen 26 (H) 6 - 23 mg/dL    Creatinine 0.95 0.50 - 1.30 mg/dL    eGFR 81 >60 mL/min/1.73m*2    Calcium 8.3 (L) 8.6 - 10.3 mg/dL    Albumin 3.3 (L) 3.4 - 5.0 g/dL    Alkaline Phosphatase 67 33 - 136 U/L    Total Protein 5.1 (L) 6.4 - 8.2 g/dL    AST 18 9 - 39 U/L    Bilirubin, Total 2.0 (H) 0.0 - 1.2 mg/dL    ALT 19 10 - 52 U/L   Protime-INR   Result Value Ref Range    Protime 28.8 (H) 9.8 - 12.8 seconds    INR 2.5 (H) 0.9 - 1.1   ECG 12 lead   Result Value Ref Range    Ventricular Rate 98 BPM    Atrial Rate 234 BPM    QRS Duration 74 ms    QT Interval 354 ms    QTC Calculation(Bazett) 451 ms    R Axis -14 degrees    T Axis -9 degrees    QRS Count 16 beats    Q Onset 228 ms    T Offset 405 ms    QTC  Nancy 417 ms   Protime-INR   Result Value Ref Range    Protime 30.2 (H) 9.8 - 12.8 seconds    INR 2.7 (H) 0.9 - 1.1   CBC   Result Value Ref Range    WBC 8.0 4.4 - 11.3 x10*3/uL    nRBC 0.0 0.0 - 0.0 /100 WBCs    RBC 3.39 (L) 4.50 - 5.90 x10*6/uL    Hemoglobin 11.5 (L) 13.5 - 17.5 g/dL    Hematocrit 34.1 (L) 41.0 - 52.0 %     (H) 80 - 100 fL    MCH 33.9 26.0 - 34.0 pg    MCHC 33.7 32.0 - 36.0 g/dL    RDW 12.9 11.5 - 14.5 %    Platelets 92 (L) 150 - 450 x10*3/uL   Basic Metabolic Panel   Result Value Ref Range    Glucose 95 74 - 99 mg/dL    Sodium 136 136 - 145 mmol/L    Potassium 4.1 3.5 - 5.3 mmol/L    Chloride 105 98 - 107 mmol/L    Bicarbonate 24 21 - 32 mmol/L    Anion Gap 11 10 - 20 mmol/L    Urea Nitrogen 21 6 - 23 mg/dL    Creatinine 0.93 0.50 - 1.30 mg/dL    eGFR 83 >60 mL/min/1.73m*2    Calcium 7.5 (L) 8.6 - 10.3 mg/dL   Protime-INR   Result Value Ref Range    Protime 14.8 (H) 9.8 - 12.8 seconds    INR 1.3 (H) 0.9 - 1.1   Sedimentation rate, automated   Result Value Ref Range    Sedimentation Rate 6 0 - 20 mm/h   C-reactive protein   Result Value Ref Range    C-Reactive Protein 13.09 (H) <1.00 mg/dL      XR pelvis 1-2 views    Result Date: 3/8/2024  Interpreted By:  Fausto Alan, STUDY: XR PELVIS 1-2 VIEWS; ;  3/8/2024 3:24 pm   INDICATION: Signs/Symptoms:As low/inferior as possible while still including superior end of implant. Goal is to include as much of bilateral femurs (please ensure proper rotation of right femur) as possible for preoperative planning. Please Epic chat me with questions. Thank you..   COMPARISON: 04/26/2023 left hip radiograph   ACCESSION NUMBER(S): [id number]   ORDERING CLINICIAN: CHANTELL CARSON   FINDINGS: The proximal left femoral shaft has an acute oblique fracture extending from the greater trochanter inferiorly and medially exiting the medial cortex 3 cm superior to the distal end of the fitted femoral prosthetic stem.   The lateral fragment is distracted laterally  and displaced proximally by about 1 cm with minimal lateral angulation.   No dislocation of the left prosthesis is noted.   No fracture or dislocation of the right hip is noted.       Acute fracture of the proximal left femoral shaft.     MACRO: None   Signed by: Fausto Alan 3/8/2024 3:27 PM Dictation workstation:   [id number]    ECG 12 lead    Result Date: 3/8/2024  Atrial fibrillation Abnormal ECG When compared with ECG of 21-OCT-2022 11:11, No significant change was found    CT pelvis wo IV contrast    Result Date: 3/8/2024  Interpreted By:  Desiree Lewis, STUDY: CT PELVIS WO IV CONTRAST; ;  3/8/2024 1:24 am   INDICATION: Signs/Symptoms:orthopedic fractures.   COMPARISON: Correlation with CT left hip and CT femur 03/07/2024   ACCESSION NUMBER(S): [id number]   ORDERING CLINICIAN: TYLER ZAMORANO   TECHNIQUE: Noncontrast CT images of the pelvis with axial, sagittal and coronal reconstructed images.   FINDINGS: Acute nondisplaced fractures of the left superior and inferior pubic rami are again noted, no significant change in alignment. There is also an acute nondisplaced fracture of the left sacral ala, not included on prior imaging. No sacroiliac or pubic symphysis diastasis.   Left total hip arthroplasty and the known left femur periprosthetic fracture are partially imaged. Mild to moderate right hip osteoarthrosis noted. Moderate to severe degenerative disc changes at L3-4, L4-5 and L5-S1.   Mild edema/hematoma adjacent to the left inferior pubic ramus. Visualized intrapelvic structures are unremarkable.       Acute nondisplaced left superior and inferior pubic rami fractures. Acute nondisplaced left sacral ala fracture. No sacroiliac or pubic symphysis diastasis.   Partially imaged known left periprosthetic femur fracture.     MACRO: None   Signed by: Desiree Lewis 3/8/2024 1:46 AM Dictation workstation:   [id number]    CT hip left wo IV contrast    Result Date: 3/7/2024  Interpreted By:  Desiree Lewis,  STUDY: CT HIP LEFT WO IV CONTRAST; CT FEMUR LEFT WO IV CONTRAST; ;  3/7/2024 5:55 pm   INDICATION: Signs/Symptoms:eval L femur fracture; Signs/Symptoms:eval femur fracture.   COMPARISON: Radiographs of the same day   ACCESSION NUMBER(S): [id number]; [id number]   ORDERING CLINICIAN: BELEN PEREZ   TECHNIQUE: Noncontrast CT images of the left hip and left femur with axial, sagittal and coronal reconstructed images. Metal artifact reduction technique was used.   FINDINGS: Left total hip arthroplasty is again noted. Acute periprosthetic femur fracture is again noted. The proximal extent of the fracture is just distal to the greater trochanter, the fracture line extends to the medial femoral diaphysis, 2-3 cm proximal to the femoral stem tip. There is mild comminution, with a not significantly displaced posterior butterfly fragment. There is 2 cm medial to lateral and anterior to posterior displacement of the dominant fracture fragments. The knee is externally rotated.   There are acute appearing nondisplaced left superior and inferior pubic rami fractures.   Subchondral cyst noted in the left acetabulum.   Mild tricompartmental osteoarthrosis of the knee.   There is edema/hematoma in the proximal to mid anterior compartment of the thigh. Mild hematoma around the left inferior pubic ramus. Visualized intrapelvic structures are unremarkable.       Acute, displaced left periprosthetic proximal femur fracture.   Acute, nondisplaced left pubic rami fractures.   Edema/hematoma in the anterior compartment of the thigh and adjacent to the inferior pubic ramus.     MACRO: None   Signed by: Desiree Lewis 3/7/2024 7:06 PM Dictation workstation:   [id number]    CT femur left wo IV contrast    Result Date: 3/7/2024  Interpreted By:  Desiree Lewis, STUDY: CT HIP LEFT WO IV CONTRAST; CT FEMUR LEFT WO IV CONTRAST; ;  3/7/2024 5:55 pm   INDICATION: Signs/Symptoms:eval L femur fracture; Signs/Symptoms:eval femur fracture.    COMPARISON: Radiographs of the same day   ACCESSION NUMBER(S): [id number]; [id number]   ORDERING CLINICIAN: BELEN PEREZ   TECHNIQUE: Noncontrast CT images of the left hip and left femur with axial, sagittal and coronal reconstructed images. Metal artifact reduction technique was used.   FINDINGS: Left total hip arthroplasty is again noted. Acute periprosthetic femur fracture is again noted. The proximal extent of the fracture is just distal to the greater trochanter, the fracture line extends to the medial femoral diaphysis, 2-3 cm proximal to the femoral stem tip. There is mild comminution, with a not significantly displaced posterior butterfly fragment. There is 2 cm medial to lateral and anterior to posterior displacement of the dominant fracture fragments. The knee is externally rotated.   There are acute appearing nondisplaced left superior and inferior pubic rami fractures.   Subchondral cyst noted in the left acetabulum.   Mild tricompartmental osteoarthrosis of the knee.   There is edema/hematoma in the proximal to mid anterior compartment of the thigh. Mild hematoma around the left inferior pubic ramus. Visualized intrapelvic structures are unremarkable.       Acute, displaced left periprosthetic proximal femur fracture.   Acute, nondisplaced left pubic rami fractures.   Edema/hematoma in the anterior compartment of the thigh and adjacent to the inferior pubic ramus.     MACRO: None   Signed by: Desiree Lewis 3/7/2024 7:06 PM Dictation workstation:   [id number]    XR shoulder left 2+ views    Result Date: 3/7/2024  Interpreted By:  Dave Hatfield, STUDY: XR SHOULDER LEFT 2+ VIEWS; ;  3/7/2024 2:11 pm   INDICATION: Signs/Symptoms:trauma.   COMPARISON: None.   ACCESSION NUMBER(S): [id number]   ORDERING CLINICIAN: PREETHI RICHARD   FINDINGS: No acute fracture or glenohumeral dislocation.No acromioclavicular seperation.Mild acromioclavicular degenerative changes.       No acute osseous abnormality  left shoulder.     MACRO: None   Signed by: Dave Hatfield 3/7/2024 2:22 PM Dictation workstation:   [id number]    XR femur left 2+ views    Result Date: 3/7/2024  Interpreted By:  Dave Hatfield, STUDY: XR PELVIS 1-2 VIEWS; XR FEMUR LEFT 2+ VIEWS; ;  3/7/2024 2:11 pm   INDICATION: Signs/Symptoms:trauma.   COMPARISON: 04/26/2023   ACCESSION NUMBER(S): [id number]; [id number]   ORDERING CLINICIAN: BELEN RICHARD   FINDINGS: Frontal view the pelvis and four views of the left femur. There is an obliquely oriented, displaced periprosthetic fracture traversing region of patient's femoral stem component of left hip arthroplasty. No dislocation. Imaged distal femur appears intact.       Acute, obliquely oriented periprosthetic fracture of the left proximal femur across region of femoral stem component of left hip arthroplasty.     MACRO: None   Signed by: Dave Hatfield 3/7/2024 2:21 PM Dictation workstation:   [id number]    XR pelvis 1-2 views    Result Date: 3/7/2024  Interpreted By:  Dave Hatfield, STUDY: XR PELVIS 1-2 VIEWS; XR FEMUR LEFT 2+ VIEWS; ;  3/7/2024 2:11 pm   INDICATION: Signs/Symptoms:trauma.   COMPARISON: 04/26/2023   ACCESSION NUMBER(S): [id number]; [id number]   ORDERING CLINICIAN: BELEN RICHARD   FINDINGS: Frontal view the pelvis and four views of the left femur. There is an obliquely oriented, displaced periprosthetic fracture traversing region of patient's femoral stem component of left hip arthroplasty. No dislocation. Imaged distal femur appears intact.       Acute, obliquely oriented periprosthetic fracture of the left proximal femur across region of femoral stem component of left hip arthroplasty.     MACRO: None   Signed by: Dave Hatfield 3/7/2024 2:21 PM Dictation workstation:   [id number]                  SIGNATURE:  Kris Kuhn MD  DATE:  March 9, 2024  TIME:  12:56 PM

## 2024-03-09 NOTE — CARE PLAN
The patient's goals for the shift include      The clinical goals for the shift include maintain comfort and safety throughout shift  Problem: Pain  Goal: My pain/discomfort is manageable  Outcome: Progressing     Problem: Safety  Goal: Patient will be injury free during hospitalization  Outcome: Progressing  Goal: I will remain free of falls  Outcome: Progressing     Problem: Daily Care  Goal: Daily care needs are met  Outcome: Progressing     Problem: Psychosocial Needs  Goal: Demonstrates ability to cope with hospitalization/illness  Outcome: Progressing  Goal: Collaborate with me, my family, and caregiver to identify my specific goals  Outcome: Progressing     Problem: Discharge Barriers  Goal: My discharge needs are met  Outcome: Progressing

## 2024-03-09 NOTE — CONSULTS
GITA/TKA Related Summary           L hip  11/2/2022: Primary GITA (Dr. Michael Lopresti at [facility])  L knee: N  R hip: N  R knee: N  Lumbar surgery or significant pathology: N            CC             Left hip pain            HPI             José Barth is an 79 y.o. male who presented to the [facility] ED on 3/7/2024 after having tripped over a curb while getting the mail and sustaining a mechanical ground level fall onto his left side. He had immediate left hip pain and was unable to stand or ambulate after the fall. He was diagnosed with a left periprosthetic femur fracture and transferred to [facility] 3/8/2024 for care. His PMH is most notable for afib on warfarin. He is unsure why he is on warfarin instead of a NOAC. His PSH is most notable for a 2022 L primary GITA. He last saw his index surgeon 4/26/2023, at which time he was doing very well.   Currently, he denies other symptoms, numbness/tingling aside aside from some minor pain in his left shoulder and a small laceration on his right hand.            HISTORIES (System Generated and “Additional” by interview)       PMH system-generated (PMH, problem list both included since EMR change caused discrepancies):   Past Medical History:   Diagnosis Date    Personal history of other diseases of the circulatory system     History of atrial fibrillation    Personal history of other specified conditions     History of fatigue     Patient Active Problem List   Diagnosis    Atrial fibrillation (CMS/HCC)    Arthritis of left hip    Arthralgia of hip    Hyperlipemia    Lumbar radiculopathy    Osteoarthritis    Pure hypercholesterolemia    Sciatica of left side without back pain    Status post left hip replacement    Swelling of lower extremity    Varicose vein of leg    Presence of left artificial hip joint    Permanent atrial fibrillation (CMS/HCC)    Encounter for current long-term use of anticoagulants    Paroxysmal atrial fibrillation (CMS/HCC)   Additional  note: Denies other health issues, DVT/PE, malignancy.    PSH system-generated:   Past Surgical History:   Procedure Laterality Date    OTHER SURGICAL HISTORY  10/29/2021    Thyroid biopsy   Additional note: Denies other pertinent injuries/surgeries, including to currently injured area.    FH system-generated:   Family History   Problem Relation Name Age of Onset    Heart attack Father     Additional note: NC. Denies DVT/PE.    SH system-generated:  na  Additional note: Neg tob/etoh/illicits. Baseline independent ambulator, full use of LLE at baseline. Occupation: Retired ( at [employer]). Living arrangements: At home with wife, who has multiple myeloma but is otherwise high functioning.    Allergies system-generated:  No Known Allergies  Additional note: No PCN allergy.    Current meds system-generated:  Current Facility-Administered Medications:     [Held by provider] atorvastatin (Lipitor) tablet 20 mg, 20 mg, oral, Daily, Kris Kuhn MD    HYDROmorphone (Dilaudid) injection 0.4 mg, 0.4 mg, intravenous, q3h PRN, Kris Kuhn MD    [Held by provider] metoprolol tartrate (Lopressor) tablet 25 mg, 25 mg, oral, BID, Kris Kuhn MD    morphine injection 2 mg, 2 mg, intravenous, q4h PRN, Kris Kuhn MD, 2 mg at 03/08/24 0900    ondansetron (Zofran) injection 4 mg, 4 mg, intravenous, q8h PRN, Kris Kuhn MD    sodium chloride 0.9% infusion, 50 mL/hr, intravenous, Continuous, Kris Kuhn MD, Last Rate: 50 mL/hr at 03/08/24 0823, 50 mL/hr at 03/08/24 0823  Current Outpatient Medications:     acetaminophen (TylenoL) 325 mg capsule, Take 1 capsule (325 mg) by mouth once daily., Disp: , Rfl:     amoxicillin (Amoxil) 500 mg tablet, Take 4 tablets (2,000 mg) by mouth. Take 30 minutes before procedure, Disp: , Rfl:     atorvastatin (Lipitor) 20 mg tablet, TAKE 1 TABLET BY MOUTH ONCE  DAILY, Disp: 90 tablet, Rfl: 3    docusate sodium (Colace) 100 mg capsule, Take 1 capsule (100  "mg) by mouth once daily., Disp: , Rfl:     enoxaparin (Lovenox) 30 mg/0.3 mL syringe, Inject 0.3 mL (30 mg) under the skin 2 times a day., Disp: , Rfl:     enoxaparin (Lovenox) 30 mg/0.3 mL syringe, Inject 0.3 mL (30 mg) under the skin once daily., Disp: , Rfl:     metoprolol succinate XL (Toprol-XL) 50 mg 24 hr tablet, Take 1 tablet (50 mg) by mouth once daily., Disp: , Rfl:     metoprolol tartrate (Lopressor) 25 mg tablet, TAKE 1 TABLET BY MOUTH TWICE  DAILY, Disp: 180 tablet, Rfl: 3    oxyCODONE (Roxicodone) 5 mg immediate release tablet, Take 1 tablet (5 mg) by mouth every 4 hours if needed for severe pain (7 - 10)., Disp: , Rfl:     polyethylene glycol (Glycolax, Miralax) packet, 2 times a day., Disp: , Rfl:     warfarin (Coumadin) 4 mg tablet, Take 1 tablet (4mg) by mouth once daily EXCEPT 0.5 tablet (2mg) by mouth on Wednesdays only or as directed per [facility], Disp: 100 tablet, Rfl: 1    ROS: Neg except HPI.            OBJECTIVE            Physical Exam  Estimated body mass index is 23.63 kg/m² as calculated from the following:    Height as of this encounter: 1.753 m (5' 9\").    Weight as of this encounter: 72.6 kg (160 lb).  General: NAD, NLR on RA, AOx3    VS:  Temp:  [36.9 °C (98.4 °F)-37.2 °C (99 °F)] 36.9 °C (98.4 °F)  Heart Rate:  [] 112  Resp:  [16-19] 19  BP: ()/(57-99) 107/57    Focused MSK exam:  LUE:  -Grossly intact  -Palpable radial pulse, BCR  -Skin no open wounds, tenting  -Mild pain about L shoulder  -Painless ROM at elbow/wrist  -Compartments soft and compressible, no additional pain with passive stretch  -Motor: Fires FF (including thumb IP), FE, EPL, IO.  -Sensory: SILT in ax/m/r/u nerve distributions.    RUE:  -Grossly intact  -Palpable radial pulse, BCR  -Skin no open wounds, tenting with exception of small laceration over dorsal hand  -Bone not TTP over major prominences  -Painless ROM at shoulder/elbow/wrist  -Compartments soft and compressible, no " additional pain with passive stretch  -Motor: Fires FF (including thumb IP), FE, EPL, IO.  -Sensory: SILT in ax/m/r/u nerve distributions.    LLE:  -Grossly intact  -Palpable DP/PT pulse (only 1+, but symmetric to RLE), BCR  -Skin no open wounds, tenting  -TTP about hip  -Log roll deferred given known injury  -Compartments soft and compressible, no additional pain with passive stretch.  -Motor: Fires quads, TA, EHL, GCS  -Sensory: SILT in saph/jose armando/sp/dp/t nerve distributions.     RLE:  -Grossly intact  -Palpable DP/PT pulse, BCR  -Skin no open wounds, tenting  -Bone not TTP over major prominences  -Neg log roll. Painless ROM at hip/knee/ankle  -Compartments soft and compressible, no additional pain with passive stretch.  -Motor: Fires quads, TA, EHL, GCS  -Sensory: SILT in saph/jose armando/sp/dp/t nerve distributions.   -RLE notable for diffuse swelling, henrietta at foot, which pt states is from an old work injury      Labs (notable/relevant)  WBC 8.0x10^3/uL  Hgb 12.0g/dL  INR 2.5  Plt 107x10^3/uL  Cr 0.95mg/dL  T+S A+  A1c None available      Imaging  L shoulder radiographs: Neg for acute injury.  L femur (AP/lat), AP pelvis radiographs. Pelvis, L hip, L femur CT: Left femur B2 periprosthetic femur fx. Left LC1 fracture (nondisplaced inferior and superior pubic rami, sacral ala).            ASSESSMENT & PLAN           A/P: José Barth is a 79 y.o. male presenting with:    Left nondisplaced LC1 fracture  Discussed with that this pattern is typically treated with progressive WBAT with a walker. Pt agreed with this plan.    L GITA periprosthetic femur fracture  Background: Every case is unique, but treatment generally entails  Bed rest with risks including non/malunion, deconditioning, pna, and pressure sores is reserved for pts bedridden at baseline or unable to have surgery.  Otherwise guided by [place] classification (stem stability, fx location, bone quality)  A: Fx of trochanteric region (stable stem)  Ag  (greater) fx: Displaced <2.5cm PWB, restricted abd 6-12wks. Displaced >2.5cm ORIF.  Al (lesser) fx: Nonoperative.  B: Fx around or at tip of stem  B1 (stable stem): ORIF.  B2 (unstable stem, quality bone): Revision to long stem with ORIF (consider ORIF alone instead if low demand and short life expectancy).  B3 (unstable stem, poor bone): Proximal femur replacement or long stem with allograft  C: Fx distal to stem (stable stem). ORIF.  Shared decision making  Pt's fx is best classified as B2. I recommend and pt agrees to proceed with stem revision and ORIF. We discussed this plan may change depending on intraop findings (see consent for details).  Preop care  NWB LLE  Prefer DVT ppx with SCDs and LVX (held at midnight preop)   Preop risk evaluation appreciated  Afib on coumadin, which is being reversed by our internal medicine colleagues. Goal preop INR 1.5.  Surgical planning  Current implants (op report in Epic): Stem Depuy Corail 15 high offset. Cup Depuy Foster 52mm. Head 36mm+8.5. Liner +4, 10 degree.  Risks/benefits: See consent.  Timing pending INR reversal and OR logistics.  PMH, PSH, other considerations discussed  Hx of L sciatica, lumbar radiculopathy  LLE chronic swelling, varicose veins  Dispo  Pending operative course  Given injury, please ensure patient follows up with PCP and/or endocrinology for bone density assessment and possible treatment to prevent future injuries.  F/u with Dr. Ruffin in clinic in 2wks after dc

## 2024-03-09 NOTE — PROGRESS NOTES
S: NAEON. Comfortable this morning. Understands plan outlined below.          O  VS:  Temp:  [0 °C (32 °F)-37.1 °C (98.8 °F)] 36.9 °C (98.4 °F)  Heart Rate:  [102-147] 108  Resp:  [16-19] 17  BP: ()/(56-77) 108/67    Gen: NAD  Focused exam of LLE  -Grossly intact  -Palpable DP/PT pulse (only 1+, but symmetric to RLE), BCR  -Skin no open wounds, tenting  -TTP about hip  -Log roll deferred given known injury  -Compartments soft and compressible, no additional pain with passive stretch.  -Motor: Fires quads, TA, EHL, GCS  -Sensory: SILT in saph/jose armando/sp/dp/t nerve distributions.     Drain  Na  Micro  Na  PT/OT  Na until after surgery  Labs (pertinent)  Hgb 11.5  INR 1.3  ESR 6  CRP 131mg/L  Imaging  No new imagingd by: Dave Hatfield 3/7/2024 2:21 PM Dictation workstation:   [id number]          A&P: José Barth is a 79 y.o. male presenting with:  Left nondisplaced LC1 fracture  Discussed with that this pattern is typically treated with progressive WBAT with a walker. Pt agreed with this plan.  L GITA periprosthetic femur fracture  Preop care  NWB LLE  DVT ppx, anticoag: Prefer DVT ppx with SCDs and LVX (held midnight preop). Baseline Afib on coumadin, which has been reversed to 1.3 this AM. Please maintain below 1.5.  Preop risk evaluation appreciated  Concurrent PJI is a possibility with periprosthetic fx. Testing and treatment guidelines are not well established.  In this case, there is low suspicion based on a lack of prodromal symptoms and multiple previous serial radiographs without signs of chronic loosening. ESR and CRP are sensitive but not specific. Intraop cultures will be obtained.  Surgical timing: INR now appropriate for surgery. After discussion with several senior partners regarding their advice and availability, decided that a case of this complexity at this facility is best done during the week for both OR logistics reasons and for  availability if intra-op assistance is  desired. Therefore, will plan for OR Monday. Discussed with patient the increased morbidity/mortality/risks of waiting and how these must be balanced with optimizing the chance of a successful procedure. He understands and agrees.  Dispo  Pending operative course  Given injury, please ensure patient follows up with PCP and/or endocrinology for bone density assessment and possible treatment to prevent future injuries.  F/u with Dr. Ruffin in clinic in 2wks after dc  Patient verbalized understanding. All questions answered.

## 2024-03-10 ENCOUNTER — PREP FOR PROCEDURE (OUTPATIENT)
Dept: ORTHOPEDIC SURGERY | Facility: CLINIC | Age: 79
End: 2024-03-10
Payer: MEDICARE

## 2024-03-10 LAB
ANION GAP SERPL CALC-SCNC: 11 MMOL/L (ref 10–20)
BUN SERPL-MCNC: 19 MG/DL (ref 6–23)
CALCIUM SERPL-MCNC: 7.1 MG/DL (ref 8.6–10.3)
CHLORIDE SERPL-SCNC: 106 MMOL/L (ref 98–107)
CO2 SERPL-SCNC: 23 MMOL/L (ref 21–32)
CREAT SERPL-MCNC: 0.85 MG/DL (ref 0.5–1.3)
EGFRCR SERPLBLD CKD-EPI 2021: 88 ML/MIN/1.73M*2
ERYTHROCYTE [DISTWIDTH] IN BLOOD BY AUTOMATED COUNT: 12.3 % (ref 11.5–14.5)
GLUCOSE SERPL-MCNC: 105 MG/DL (ref 74–99)
HCT VFR BLD AUTO: 31.5 % (ref 41–52)
HGB BLD-MCNC: 10.9 G/DL (ref 13.5–17.5)
INR PPP: 1.2 (ref 0.9–1.1)
MCH RBC QN AUTO: 34.1 PG (ref 26–34)
MCHC RBC AUTO-ENTMCNC: 34.6 G/DL (ref 32–36)
MCV RBC AUTO: 98 FL (ref 80–100)
NRBC BLD-RTO: 0 /100 WBCS (ref 0–0)
PLATELET # BLD AUTO: 74 X10*3/UL (ref 150–450)
POTASSIUM SERPL-SCNC: 4 MMOL/L (ref 3.5–5.3)
PROTHROMBIN TIME: 13.9 SECONDS (ref 9.8–12.8)
RBC # BLD AUTO: 3.2 X10*6/UL (ref 4.5–5.9)
SODIUM SERPL-SCNC: 136 MMOL/L (ref 136–145)
WBC # BLD AUTO: 7.7 X10*3/UL (ref 4.4–11.3)

## 2024-03-10 PROCEDURE — 99232 SBSQ HOSP IP/OBS MODERATE 35: CPT | Performed by: INTERNAL MEDICINE

## 2024-03-10 PROCEDURE — 1200000002 HC GENERAL ROOM WITH TELEMETRY DAILY

## 2024-03-10 PROCEDURE — 85027 COMPLETE CBC AUTOMATED: CPT | Performed by: NURSE PRACTITIONER

## 2024-03-10 PROCEDURE — 36415 COLL VENOUS BLD VENIPUNCTURE: CPT | Performed by: NURSE PRACTITIONER

## 2024-03-10 PROCEDURE — 80048 BASIC METABOLIC PNL TOTAL CA: CPT | Performed by: NURSE PRACTITIONER

## 2024-03-10 PROCEDURE — 2500000001 HC RX 250 WO HCPCS SELF ADMINISTERED DRUGS (ALT 637 FOR MEDICARE OP): Performed by: NURSE PRACTITIONER

## 2024-03-10 PROCEDURE — 2500000004 HC RX 250 GENERAL PHARMACY W/ HCPCS (ALT 636 FOR OP/ED): Performed by: INTERNAL MEDICINE

## 2024-03-10 PROCEDURE — 85610 PROTHROMBIN TIME: CPT | Performed by: NURSE PRACTITIONER

## 2024-03-10 RX ADMIN — MORPHINE SULFATE 2 MG: 2 INJECTION, SOLUTION INTRAMUSCULAR; INTRAVENOUS at 18:51

## 2024-03-10 RX ADMIN — METOPROLOL TARTRATE 25 MG: 25 TABLET, FILM COATED ORAL at 08:35

## 2024-03-10 RX ADMIN — MORPHINE SULFATE 2 MG: 2 INJECTION, SOLUTION INTRAMUSCULAR; INTRAVENOUS at 05:42

## 2024-03-10 RX ADMIN — ENOXAPARIN SODIUM 105 MG: 120 INJECTION SUBCUTANEOUS at 08:35

## 2024-03-10 RX ADMIN — METOPROLOL TARTRATE 25 MG: 25 TABLET, FILM COATED ORAL at 20:27

## 2024-03-10 RX ADMIN — SODIUM CHLORIDE 50 ML/HR: 9 INJECTION, SOLUTION INTRAVENOUS at 08:33

## 2024-03-10 ASSESSMENT — PAIN DESCRIPTION - LOCATION: LOCATION: HIP

## 2024-03-10 ASSESSMENT — PAIN - FUNCTIONAL ASSESSMENT
PAIN_FUNCTIONAL_ASSESSMENT: 0-10
PAIN_FUNCTIONAL_ASSESSMENT: 0-10
PAIN_FUNCTIONAL_ASSESSMENT: WONG-BAKER FACES
PAIN_FUNCTIONAL_ASSESSMENT: 0-10

## 2024-03-10 ASSESSMENT — PAIN SCALES - GENERAL
PAINLEVEL_OUTOF10: 0 - NO PAIN
PAINLEVEL_OUTOF10: 7
PAINLEVEL_OUTOF10: 0 - NO PAIN
PAINLEVEL_OUTOF10: 6
PAINLEVEL_OUTOF10: 3

## 2024-03-10 ASSESSMENT — PAIN SCALES - WONG BAKER
WONGBAKER_NUMERICALRESPONSE: HURTS LITTLE BIT
WONGBAKER_NUMERICALRESPONSE: NO HURT

## 2024-03-10 NOTE — CARE PLAN
The patient's goals for the shift include      The clinical goals for the shift include Maintain patient safety throughout the shift    Problem: Pain  Goal: My pain/discomfort is manageable  Outcome: Progressing     Problem: Safety  Goal: Patient will be injury free during hospitalization  Outcome: Progressing  Goal: I will remain free of falls  Outcome: Progressing     Problem: Daily Care  Goal: Daily care needs are met  Outcome: Progressing     Problem: Psychosocial Needs  Goal: Demonstrates ability to cope with hospitalization/illness  Outcome: Progressing  Goal: Collaborate with me, my family, and caregiver to identify my specific goals  Outcome: Progressing     Problem: Discharge Barriers  Goal: My discharge needs are met  Outcome: Progressing     Problem: Skin  Goal: Decreased wound size/increased tissue granulation at next dressing change  Outcome: Progressing  Goal: Prevent/manage excess moisture  Outcome: Progressing  Goal: Promote/optimize nutrition  Outcome: Progressing

## 2024-03-10 NOTE — PROGRESS NOTES
PROGRESS NOTE - INTERNAL MEDICINE     PATIENT NAME:  José Barth    MRN:  [id number]  SERVICE DATE:  3/10/2024   SERVICE TIME:  3:52 PM     ADMITTING PHYSICIAN:  Kris Kuhn MD    ASSESSMENT & PLAN:  Principal Problem:    Closed left hip fracture, initial encounter (CMS/[facility])  Patient transferred from [facility] emergency room.  Orthopedic consult sought  Pain management and IV fluids and possible surgical intervention once INR is 1.5 or below.     Active problems  Atrial fibrillation with controlled ventricular rate  Patient was given  5 MG IV metoprolol  forheart rate is greater than 120 bpm last night and was started on metoprolol 25 mg twice daily and current heart rate is 93 bpm with atrial fibrillation and is hemodynamically stable.    Repeat INR is 1.2 today,   l discontinued his Lovenox today.       Hypertension stable blood pressures are rather low normal.    Hyperlipidemia patient takes atorvastatin at home which will be resumed after surgery.    Mild thrombocytopenia with platelet counts of 107, 92 and 74,000 today, Lovenox discontinued and monitor platelet counts closely if less than 50,000 and then he can get a unit of platelet transfusion prior to surgery.     Reviewed all labs and imaging studies and discussed the plan of treatment with patient in detail.    I spoke to Dr. Ruffin, patient is going to go for surgery on Monday, March 11, 2024.  He is medically stable will and cleared for surgery and has moderate surgical risk.  His restratification risk score is suggestive of intermediate surgical risk with mortality of 1%.        Total time spent in examination counseling coordinating care for this patient was greater than 35 minutes.     Subjective chief complaints: Patient fell on the driveway and had left hip periprosthetic fracture and was seen at [place] emergency room and then transferred to [facility] on a.m. of March 8, 2024.     Interval history of present  "illness: Patient seen and examined, he is coming along fine, INR is less than 1.2 now, his platelet count is around 74,000 which could be related to Lovenox [place], will observe closely if less than 50,000 then patient may benefit from unit of platelet transfusion prior to surgery.  Remains in atrial fibrillation, rate was little faster last night for which she received 5 mg of IV metoprolol and will continue with oral metoprolol as before and monitor closely.            MEDICATIONS:   Current Facility-Administered Medications:     [Held by provider] atorvastatin (Lipitor) tablet 20 mg, 20 mg, oral, Daily, Kris Kuhn MD    enoxaparin (Lovenox) syringe 105 mg, 1.5 mg/kg, subcutaneous, q24h, Kris Kuhn MD, 105 mg at 03/10/24 0835    HYDROmorphone (Dilaudid) injection 0.4 mg, 0.4 mg, intravenous, q3h PRN, Kris Kuhn MD, 0.4 mg at 03/09/24 2228    metoprolol tartrate (Lopressor) tablet 25 mg, 25 mg, oral, BID, Chani Garcia, APRN-CNP, 25 mg at 03/10/24 0835    morphine injection 2 mg, 2 mg, intravenous, q4h PRN, Kris Kuhn MD, 2 mg at 03/10/24 0542    ondansetron (Zofran) injection 4 mg, 4 mg, intravenous, q8h PRN, Kris Kuhn MD    sodium chloride 0.9% infusion, 50 mL/hr, intravenous, Continuous, Kris Kuhn MD, Last Rate: 50 mL/hr at 03/10/24 0833, 50 mL/hr at 03/10/24 0833        OBJECTIVE  Visit Vitals  /64   Pulse 93   Temp 36.5 °C (97.7 °F) (Temporal)   Resp 18   Ht 1.753 m (5' 9.02\")   Wt 72.6 kg (160 lb)   SpO2 94%   BMI 23.62 kg/m²   Smoking Status Never   BSA 1.88 m²      PHYSICAL EXAM:   GENERAL: healthy, alert, no distress, cooperative  OROPHARYNX: Lips, mucosa, and tongue normal. Teeth and gums normal. Oropharynx normal.  NECK: no jugulovenous distention, no carotid bruits, carotid pulse normal contour, supple  BACK: Back symmetric, Normal curvature, ROM normal, No CVAT.  LUNGS: Lungs clear to auscultation. Good diaphragmatic excursion.  CARDIAC: " Remains in atrial fibrillation with controlled ventricular rate, no acute ischemic changes, normal S1 and S2; no rubs,  or gallops, 1/6 systolic ejection murmur left sternal border, no CHF.  ABDOMEN: Abdomen soft, non-tender. BS normal. No masses or organomegaly.  Musculoskeletal status post left hip periprosthetic fracture and tenderness of left hip.  EXTREMITIES: Extremities normal. No deformities, edema, clubbing or skin discoloration.  NEURO: No neurological deficit.  Reflexes normal and symmetric. Sensation grossly intact., Cranial nerves II-XII intact  PULSES: 2+ radial, 2+ carotid      DATA:   Diagnostic tests reviewed for today's visit:    Results for orders placed or performed during the hospital encounter of 03/08/24 (from the past 96 hour(s))   CBC   Result Value Ref Range    WBC 8.0 4.4 - 11.3 x10*3/uL    nRBC 0.0 0.0 - 0.0 /100 WBCs    RBC 3.59 (L) 4.50 - 5.90 x10*6/uL    Hemoglobin 12.0 (L) 13.5 - 17.5 g/dL    Hematocrit 35.4 (L) 41.0 - 52.0 %    MCV 99 80 - 100 fL    MCH 33.4 26.0 - 34.0 pg    MCHC 33.9 32.0 - 36.0 g/dL    RDW 13.0 11.5 - 14.5 %    Platelets 107 (L) 150 - 450 x10*3/uL   Comprehensive metabolic panel   Result Value Ref Range    Glucose 107 (H) 74 - 99 mg/dL    Sodium 137 136 - 145 mmol/L    Potassium 4.2 3.5 - 5.3 mmol/L    Chloride 105 98 - 107 mmol/L    Bicarbonate 24 21 - 32 mmol/L    Anion Gap 12 10 - 20 mmol/L    Urea Nitrogen 26 (H) 6 - 23 mg/dL    Creatinine 0.95 0.50 - 1.30 mg/dL    eGFR 81 >60 mL/min/1.73m*2    Calcium 8.3 (L) 8.6 - 10.3 mg/dL    Albumin 3.3 (L) 3.4 - 5.0 g/dL    Alkaline Phosphatase 67 33 - 136 U/L    Total Protein 5.1 (L) 6.4 - 8.2 g/dL    AST 18 9 - 39 U/L    Bilirubin, Total 2.0 (H) 0.0 - 1.2 mg/dL    ALT 19 10 - 52 U/L   Protime-INR   Result Value Ref Range    Protime 28.8 (H) 9.8 - 12.8 seconds    INR 2.5 (H) 0.9 - 1.1   ECG 12 lead   Result Value Ref Range    Ventricular Rate 98 BPM    Atrial Rate 234 BPM    QRS Duration 74 ms    QT Interval 354 ms     QTC Calculation(Bazett) 451 ms    R Axis -14 degrees    T Axis -9 degrees    QRS Count 16 beats    Q Onset 228 ms    T Offset 405 ms    QTC Fredericia 417 ms   Protime-INR   Result Value Ref Range    Protime 30.2 (H) 9.8 - 12.8 seconds    INR 2.7 (H) 0.9 - 1.1   CBC   Result Value Ref Range    WBC 8.0 4.4 - 11.3 x10*3/uL    nRBC 0.0 0.0 - 0.0 /100 WBCs    RBC 3.39 (L) 4.50 - 5.90 x10*6/uL    Hemoglobin 11.5 (L) 13.5 - 17.5 g/dL    Hematocrit 34.1 (L) 41.0 - 52.0 %     (H) 80 - 100 fL    MCH 33.9 26.0 - 34.0 pg    MCHC 33.7 32.0 - 36.0 g/dL    RDW 12.9 11.5 - 14.5 %    Platelets 92 (L) 150 - 450 x10*3/uL   Basic Metabolic Panel   Result Value Ref Range    Glucose 95 74 - 99 mg/dL    Sodium 136 136 - 145 mmol/L    Potassium 4.1 3.5 - 5.3 mmol/L    Chloride 105 98 - 107 mmol/L    Bicarbonate 24 21 - 32 mmol/L    Anion Gap 11 10 - 20 mmol/L    Urea Nitrogen 21 6 - 23 mg/dL    Creatinine 0.93 0.50 - 1.30 mg/dL    eGFR 83 >60 mL/min/1.73m*2    Calcium 7.5 (L) 8.6 - 10.3 mg/dL   Protime-INR   Result Value Ref Range    Protime 14.8 (H) 9.8 - 12.8 seconds    INR 1.3 (H) 0.9 - 1.1   Sedimentation rate, automated   Result Value Ref Range    Sedimentation Rate 6 0 - 20 mm/h   C-reactive protein   Result Value Ref Range    C-Reactive Protein 13.09 (H) <1.00 mg/dL   CBC   Result Value Ref Range    WBC 7.7 4.4 - 11.3 x10*3/uL    nRBC 0.0 0.0 - 0.0 /100 WBCs    RBC 3.20 (L) 4.50 - 5.90 x10*6/uL    Hemoglobin 10.9 (L) 13.5 - 17.5 g/dL    Hematocrit 31.5 (L) 41.0 - 52.0 %    MCV 98 80 - 100 fL    MCH 34.1 (H) 26.0 - 34.0 pg    MCHC 34.6 32.0 - 36.0 g/dL    RDW 12.3 11.5 - 14.5 %    Platelets 74 (L) 150 - 450 x10*3/uL   Basic Metabolic Panel   Result Value Ref Range    Glucose 105 (H) 74 - 99 mg/dL    Sodium 136 136 - 145 mmol/L    Potassium 4.0 3.5 - 5.3 mmol/L    Chloride 106 98 - 107 mmol/L    Bicarbonate 23 21 - 32 mmol/L    Anion Gap 11 10 - 20 mmol/L    Urea Nitrogen 19 6 - 23 mg/dL    Creatinine 0.85 0.50 - 1.30 mg/dL     eGFR 88 >60 mL/min/1.73m*2    Calcium 7.1 (L) 8.6 - 10.3 mg/dL   Protime-INR   Result Value Ref Range    Protime 13.9 (H) 9.8 - 12.8 seconds    INR 1.2 (H) 0.9 - 1.1      XR pelvis 1-2 views    Result Date: 3/8/2024  Interpreted By:  Fausto Alan, STUDY: XR PELVIS 1-2 VIEWS; ;  3/8/2024 3:24 pm   INDICATION: Signs/Symptoms:As low/inferior as possible while still including superior end of implant. Goal is to include as much of bilateral femurs (please ensure proper rotation of right femur) as possible for preoperative planning. Please Epic chat me with questions. Thank you..   COMPARISON: 04/26/2023 left hip radiograph   ACCESSION NUMBER(S): [id number]   ORDERING CLINICIAN: CHANTELL CARSON   FINDINGS: The proximal left femoral shaft has an acute oblique fracture extending from the greater trochanter inferiorly and medially exiting the medial cortex 3 cm superior to the distal end of the fitted femoral prosthetic stem.   The lateral fragment is distracted laterally and displaced proximally by about 1 cm with minimal lateral angulation.   No dislocation of the left prosthesis is noted.   No fracture or dislocation of the right hip is noted.       Acute fracture of the proximal left femoral shaft.     MACRO: None   Signed by: Fausto Alan 3/8/2024 3:27 PM Dictation workstation:   [id number]    ECG 12 lead    Result Date: 3/8/2024  Atrial fibrillation Abnormal ECG When compared with ECG of 21-OCT-2022 11:11, No significant change was found    CT pelvis wo IV contrast    Result Date: 3/8/2024  Interpreted By:  Desiree Lewis, STUDY: CT PELVIS WO IV CONTRAST; ;  3/8/2024 1:24 am   INDICATION: Signs/Symptoms:orthopedic fractures.   COMPARISON: Correlation with CT left hip and CT femur 03/07/2024   ACCESSION NUMBER(S): [id number]   ORDERING CLINICIAN: TYLER ZAMORANO   TECHNIQUE: Noncontrast CT images of the pelvis with axial, sagittal and coronal reconstructed images.   FINDINGS: Acute nondisplaced fractures of the left  superior and inferior pubic rami are again noted, no significant change in alignment. There is also an acute nondisplaced fracture of the left sacral ala, not included on prior imaging. No sacroiliac or pubic symphysis diastasis.   Left total hip arthroplasty and the known left femur periprosthetic fracture are partially imaged. Mild to moderate right hip osteoarthrosis noted. Moderate to severe degenerative disc changes at L3-4, L4-5 and L5-S1.   Mild edema/hematoma adjacent to the left inferior pubic ramus. Visualized intrapelvic structures are unremarkable.       Acute nondisplaced left superior and inferior pubic rami fractures. Acute nondisplaced left sacral ala fracture. No sacroiliac or pubic symphysis diastasis.   Partially imaged known left periprosthetic femur fracture.     MACRO: None   Signed by: Desiree Lewis 3/8/2024 1:46 AM Dictation workstation:   [id number]    CT hip left wo IV contrast    Result Date: 3/7/2024  Interpreted By:  Desiree Lewis, STUDY: CT HIP LEFT WO IV CONTRAST; CT FEMUR LEFT WO IV CONTRAST; ;  3/7/2024 5:55 pm   INDICATION: Signs/Symptoms:eval L femur fracture; Signs/Symptoms:eval femur fracture.   COMPARISON: Radiographs of the same day   ACCESSION NUMBER(S): [id number]; [id number]   ORDERING CLINICIAN: BELEN PEREZ   TECHNIQUE: Noncontrast CT images of the left hip and left femur with axial, sagittal and coronal reconstructed images. Metal artifact reduction technique was used.   FINDINGS: Left total hip arthroplasty is again noted. Acute periprosthetic femur fracture is again noted. The proximal extent of the fracture is just distal to the greater trochanter, the fracture line extends to the medial femoral diaphysis, 2-3 cm proximal to the femoral stem tip. There is mild comminution, with a not significantly displaced posterior butterfly fragment. There is 2 cm medial to lateral and anterior to posterior displacement of the dominant fracture fragments. The knee is  externally rotated.   There are acute appearing nondisplaced left superior and inferior pubic rami fractures.   Subchondral cyst noted in the left acetabulum.   Mild tricompartmental osteoarthrosis of the knee.   There is edema/hematoma in the proximal to mid anterior compartment of the thigh. Mild hematoma around the left inferior pubic ramus. Visualized intrapelvic structures are unremarkable.       Acute, displaced left periprosthetic proximal femur fracture.   Acute, nondisplaced left pubic rami fractures.   Edema/hematoma in the anterior compartment of the thigh and adjacent to the inferior pubic ramus.     MACRO: None   Signed by: Desiree Lewis 3/7/2024 7:06 PM Dictation workstation:   [id number]    CT femur left wo IV contrast    Result Date: 3/7/2024  Interpreted By:  Desiree Lewis, STUDY: CT HIP LEFT WO IV CONTRAST; CT FEMUR LEFT WO IV CONTRAST; ;  3/7/2024 5:55 pm   INDICATION: Signs/Symptoms:eval L femur fracture; Signs/Symptoms:eval femur fracture.   COMPARISON: Radiographs of the same day   ACCESSION NUMBER(S): [id number]; [id number]   ORDERING CLINICIAN: BELEN PEREZ   TECHNIQUE: Noncontrast CT images of the left hip and left femur with axial, sagittal and coronal reconstructed images. Metal artifact reduction technique was used.   FINDINGS: Left total hip arthroplasty is again noted. Acute periprosthetic femur fracture is again noted. The proximal extent of the fracture is just distal to the greater trochanter, the fracture line extends to the medial femoral diaphysis, 2-3 cm proximal to the femoral stem tip. There is mild comminution, with a not significantly displaced posterior butterfly fragment. There is 2 cm medial to lateral and anterior to posterior displacement of the dominant fracture fragments. The knee is externally rotated.   There are acute appearing nondisplaced left superior and inferior pubic rami fractures.   Subchondral cyst noted in the left acetabulum.   Mild  tricompartmental osteoarthrosis of the knee.   There is edema/hematoma in the proximal to mid anterior compartment of the thigh. Mild hematoma around the left inferior pubic ramus. Visualized intrapelvic structures are unremarkable.       Acute, displaced left periprosthetic proximal femur fracture.   Acute, nondisplaced left pubic rami fractures.   Edema/hematoma in the anterior compartment of the thigh and adjacent to the inferior pubic ramus.     MACRO: None   Signed by: Desiree Lewis 3/7/2024 7:06 PM Dictation workstation:   [id number]    XR shoulder left 2+ views    Result Date: 3/7/2024  Interpreted By:  Dave Hatfield, STUDY: XR SHOULDER LEFT 2+ VIEWS; ;  3/7/2024 2:11 pm   INDICATION: Signs/Symptoms:trauma.   COMPARISON: None.   ACCESSION NUMBER(S): [id number]   ORDERING CLINICIAN: PREETHI RICHARD   FINDINGS: No acute fracture or glenohumeral dislocation.No acromioclavicular seperation.Mild acromioclavicular degenerative changes.       No acute osseous abnormality left shoulder.     MACRO: None   Signed by: Dave Hatfield 3/7/2024 2:22 PM Dictation workstation:   [id number]    XR femur left 2+ views    Result Date: 3/7/2024  Interpreted By:  Dave Hatfield, STUDY: XR PELVIS 1-2 VIEWS; XR FEMUR LEFT 2+ VIEWS; ;  3/7/2024 2:11 pm   INDICATION: Signs/Symptoms:trauma.   COMPARISON: 04/26/2023   ACCESSION NUMBER(S): [id number]; [id number]   ORDERING CLINICIAN: BELEN RICHARD   FINDINGS: Frontal view the pelvis and four views of the left femur. There is an obliquely oriented, displaced periprosthetic fracture traversing region of patient's femoral stem component of left hip arthroplasty. No dislocation. Imaged distal femur appears intact.       Acute, obliquely oriented periprosthetic fracture of the left proximal femur across region of femoral stem component of left hip arthroplasty.     MACRO: None   Signed by: Dave Hatfield 3/7/2024 2:21 PM Dictation workstation:   [id number]    XR  pelvis 1-2 views    Result Date: 3/7/2024  Interpreted By:  Dave Hatfield, STUDY: XR PELVIS 1-2 VIEWS; XR FEMUR LEFT 2+ VIEWS; ;  3/7/2024 2:11 pm   INDICATION: Signs/Symptoms:trauma.   COMPARISON: 04/26/2023   ACCESSION NUMBER(S): [id number]; [id number]   ORDERING CLINICIAN: BELEN RICHARD   FINDINGS: Frontal view the pelvis and four views of the left femur. There is an obliquely oriented, displaced periprosthetic fracture traversing region of patient's femoral stem component of left hip arthroplasty. No dislocation. Imaged distal femur appears intact.       Acute, obliquely oriented periprosthetic fracture of the left proximal femur across region of femoral stem component of left hip arthroplasty.     MACRO: None   Signed by: Dave Hatfield 3/7/2024 2:21 PM Dictation workstation:   [id number]                  SIGNATURE:  Kris Kuhn MD  DATE:  March 10, 2024  TIME:  3:51 PM

## 2024-03-10 NOTE — PROGRESS NOTES
3/10/2024 12:24 PM I met with patient. I discussed we will determine what his discharge plans will be after he is seen by rehab. He agrees to SNF if he needs SNF. I have provided him with a SNF list. He said his choices are 1) [facility] 2) [facility] and 3) [facility]. I have requested that referrals be sent. [facility]

## 2024-03-10 NOTE — PROGRESS NOTES
S: NAEON. Remains comfortable. Son at bedside, informed of plan and agrees. All questions answered.          O  VS:  Temp:  [36.5 °C (97.7 °F)-37.7 °C (99.8 °F)] 36.5 °C (97.7 °F)  Heart Rate:  [] 93  Resp:  [16-18] 18  BP: ()/(61-74) 101/64    Gen: NAD  Focused exam of LLE remains unchanged  -Grossly intact  -Palpable DP/PT pulse (only 1+, but symmetric to RLE), BCR  -Skin no open wounds, tenting  -TTP about hip  -Log roll deferred given known injury  -Compartments soft and compressible, no additional pain with passive stretch.  -Motor: Fires quads, TA, EHL, GCS  -Sensory: SILT in saph/jose armando/sp/dp/t nerve distributions.     Drain  Na  Micro  Na  PT/OT  Na until after surgery  Labs (pertinent)  Hgb 10.9  INR 1.2  Platelets 74  Imaging  No new imaging          A&P: José Barth is a 79 y.o. male presenting with:  Left nondisplaced LC1 fracture  Discussed with that this pattern is typically treated with progressive WBAT with a walker. Pt agreed with this plan.  L GITA periprosthetic femur fracture  Preop care  NWB LLE  DVT ppx, anticoag: Prefer DVT ppx with SCDs and LVX (held midnight preop). Baseline Afib on coumadin, which has been reversed. Please maintain below 1.5.  Preop risk evaluation completed by IM and appreciated (moderate risk)  Surgical considerations  Concurrent PJI: Is a possibility with periprosthetic fx. Testing and treatment guidelines are not well established.  In this case, there is low suspicion based on a lack of prodromal symptoms and multiple previous serial radiographs without signs of chronic loosening. ESR (6) and CRP (131mg/L) are sensitive but not specific. Intraop cultures will be obtained.  Timing: Planning for 3/11/2024 at 1pm. After discussion with several senior partners regarding their advice and availability, decided that a case of this complexity at this facility is best done during the week for both OR logistics reasons and for  availability if  intra-op assistance is desired. Therefore, will plan for OR Monday. Discussed with patient the increased morbidity/mortality/risks of waiting and how these must be balanced with optimizing the chance of a successful procedure. He understands and agrees.  Blood mgmt: Pt will likely require transfusion given current Hgb. Discussed platelet decrease with IM team, thought to be from LVX.  Dispo  Pending operative course  Given injury, please ensure patient follows up with PCP and/or endocrinology for bone density assessment and possible treatment to prevent future injuries.  F/u with Dr. Ruffin in clinic in 2wks after dc  Patient verbalized understanding. All questions answered.

## 2024-03-10 NOTE — PROGRESS NOTES
03/10/24 1224   Discharge Planning   Patient expects to be discharged to: SNF   Patient Choice   Provider Choice list and CMS website (https://medicare.gov/care-compare#search) for post-acute Quality and Resource Measure Data were provided and reviewed with: Patient

## 2024-03-11 ENCOUNTER — DOCUMENTATION (OUTPATIENT)
Dept: POSTOP/PACU | Facility: HOSPITAL | Age: 79
End: 2024-03-11

## 2024-03-11 ENCOUNTER — ANESTHESIA (OUTPATIENT)
Dept: OPERATING ROOM | Facility: HOSPITAL | Age: 79
DRG: 467 | End: 2024-03-11
Payer: MEDICARE

## 2024-03-11 ENCOUNTER — APPOINTMENT (OUTPATIENT)
Dept: RADIOLOGY | Facility: HOSPITAL | Age: 79
DRG: 467 | End: 2024-03-11
Payer: MEDICARE

## 2024-03-11 ENCOUNTER — ANESTHESIA EVENT (OUTPATIENT)
Dept: OPERATING ROOM | Facility: HOSPITAL | Age: 79
DRG: 467 | End: 2024-03-11
Payer: MEDICARE

## 2024-03-11 LAB
ABO GROUP (TYPE) IN BLOOD: NORMAL
ABO GROUP (TYPE) IN BLOOD: NORMAL
ANION GAP SERPL CALC-SCNC: 11 MMOL/L (ref 10–20)
ANTIBODY SCREEN: NORMAL
BUN SERPL-MCNC: 17 MG/DL (ref 6–23)
CALCIUM SERPL-MCNC: 7.1 MG/DL (ref 8.6–10.3)
CHLORIDE SERPL-SCNC: 103 MMOL/L (ref 98–107)
CO2 SERPL-SCNC: 24 MMOL/L (ref 21–32)
CREAT SERPL-MCNC: 0.77 MG/DL (ref 0.5–1.3)
EGFRCR SERPLBLD CKD-EPI 2021: >90 ML/MIN/1.73M*2
ERYTHROCYTE [DISTWIDTH] IN BLOOD BY AUTOMATED COUNT: 12.3 % (ref 11.5–14.5)
ERYTHROCYTE [DISTWIDTH] IN BLOOD BY AUTOMATED COUNT: 12.4 % (ref 11.5–14.5)
GLUCOSE SERPL-MCNC: 98 MG/DL (ref 74–99)
HCT VFR BLD AUTO: 24.3 % (ref 41–52)
HCT VFR BLD AUTO: 31 % (ref 41–52)
HGB BLD-MCNC: 10.6 G/DL (ref 13.5–17.5)
HGB BLD-MCNC: 8.2 G/DL (ref 13.5–17.5)
INR PPP: 1.3 (ref 0.9–1.1)
MCH RBC QN AUTO: 34.1 PG (ref 26–34)
MCH RBC QN AUTO: 34.3 PG (ref 26–34)
MCHC RBC AUTO-ENTMCNC: 33.7 G/DL (ref 32–36)
MCHC RBC AUTO-ENTMCNC: 34.2 G/DL (ref 32–36)
MCV RBC AUTO: 100 FL (ref 80–100)
MCV RBC AUTO: 102 FL (ref 80–100)
NRBC BLD-RTO: 0 /100 WBCS (ref 0–0)
NRBC BLD-RTO: 0 /100 WBCS (ref 0–0)
PLATELET # BLD AUTO: 89 X10*3/UL (ref 150–450)
PLATELET # BLD AUTO: 97 X10*3/UL (ref 150–450)
POTASSIUM SERPL-SCNC: 3.9 MMOL/L (ref 3.5–5.3)
PROTHROMBIN TIME: 14.9 SECONDS (ref 9.8–12.8)
RBC # BLD AUTO: 2.39 X10*6/UL (ref 4.5–5.9)
RBC # BLD AUTO: 3.11 X10*6/UL (ref 4.5–5.9)
RH FACTOR (ANTIGEN D): NORMAL
RH FACTOR (ANTIGEN D): NORMAL
SODIUM SERPL-SCNC: 134 MMOL/L (ref 136–145)
WBC # BLD AUTO: 7.7 X10*3/UL (ref 4.4–11.3)
WBC # BLD AUTO: 8.3 X10*3/UL (ref 4.4–11.3)

## 2024-03-11 PROCEDURE — 2500000004 HC RX 250 GENERAL PHARMACY W/ HCPCS (ALT 636 FOR OP/ED): Performed by: STUDENT IN AN ORGANIZED HEALTH CARE EDUCATION/TRAINING PROGRAM

## 2024-03-11 PROCEDURE — 86920 COMPATIBILITY TEST SPIN: CPT

## 2024-03-11 PROCEDURE — 27138 REVISE HIP JOINT REPLACEMENT: CPT | Performed by: STUDENT IN AN ORGANIZED HEALTH CARE EDUCATION/TRAINING PROGRAM

## 2024-03-11 PROCEDURE — 0SPS0JZ REMOVAL OF SYNTHETIC SUBSTITUTE FROM LEFT HIP JOINT, FEMORAL SURFACE, OPEN APPROACH: ICD-10-PCS | Performed by: STUDENT IN AN ORGANIZED HEALTH CARE EDUCATION/TRAINING PROGRAM

## 2024-03-11 PROCEDURE — 87102 FUNGUS ISOLATION CULTURE: CPT | Mod: AHULAB | Performed by: STUDENT IN AN ORGANIZED HEALTH CARE EDUCATION/TRAINING PROGRAM

## 2024-03-11 PROCEDURE — 36415 COLL VENOUS BLD VENIPUNCTURE: CPT | Performed by: INTERNAL MEDICINE

## 2024-03-11 PROCEDURE — 2500000004 HC RX 250 GENERAL PHARMACY W/ HCPCS (ALT 636 FOR OP/ED): Performed by: NURSE ANESTHETIST, CERTIFIED REGISTERED

## 2024-03-11 PROCEDURE — 2500000004 HC RX 250 GENERAL PHARMACY W/ HCPCS (ALT 636 FOR OP/ED)

## 2024-03-11 PROCEDURE — 2500000001 HC RX 250 WO HCPCS SELF ADMINISTERED DRUGS (ALT 637 FOR MEDICARE OP): Performed by: NURSE PRACTITIONER

## 2024-03-11 PROCEDURE — 2500000004 HC RX 250 GENERAL PHARMACY W/ HCPCS (ALT 636 FOR OP/ED): Performed by: INTERNAL MEDICINE

## 2024-03-11 PROCEDURE — 3600000017 HC OR TIME - EACH INCREMENTAL 1 MINUTE - PROCEDURE LEVEL SIX: Performed by: STUDENT IN AN ORGANIZED HEALTH CARE EDUCATION/TRAINING PROGRAM

## 2024-03-11 PROCEDURE — 73502 X-RAY EXAM HIP UNI 2-3 VIEWS: CPT | Mod: LEFT SIDE | Performed by: RADIOLOGY

## 2024-03-11 PROCEDURE — 3700000001 HC GENERAL ANESTHESIA TIME - INITIAL BASE CHARGE: Performed by: STUDENT IN AN ORGANIZED HEALTH CARE EDUCATION/TRAINING PROGRAM

## 2024-03-11 PROCEDURE — A6213 FOAM DRG >16<=48 SQ IN W/BDR: HCPCS | Performed by: STUDENT IN AN ORGANIZED HEALTH CARE EDUCATION/TRAINING PROGRAM

## 2024-03-11 PROCEDURE — 36430 TRANSFUSION BLD/BLD COMPNT: CPT

## 2024-03-11 PROCEDURE — 85610 PROTHROMBIN TIME: CPT | Performed by: NURSE PRACTITIONER

## 2024-03-11 PROCEDURE — 87070 CULTURE OTHR SPECIMN AEROBIC: CPT | Mod: AHULAB | Performed by: STUDENT IN AN ORGANIZED HEALTH CARE EDUCATION/TRAINING PROGRAM

## 2024-03-11 PROCEDURE — 76000 FLUOROSCOPY <1 HR PHYS/QHP: CPT | Mod: LT

## 2024-03-11 PROCEDURE — 27244 TREAT THIGH FRACTURE: CPT | Performed by: STUDENT IN AN ORGANIZED HEALTH CARE EDUCATION/TRAINING PROGRAM

## 2024-03-11 PROCEDURE — 73552 X-RAY EXAM OF FEMUR 2/>: CPT | Mod: LT

## 2024-03-11 PROCEDURE — 86850 RBC ANTIBODY SCREEN: CPT | Performed by: INTERNAL MEDICINE

## 2024-03-11 PROCEDURE — 36415 COLL VENOUS BLD VENIPUNCTURE: CPT | Performed by: NURSE PRACTITIONER

## 2024-03-11 PROCEDURE — 2500000004 HC RX 250 GENERAL PHARMACY W/ HCPCS (ALT 636 FOR OP/ED): Performed by: ANESTHESIOLOGIST ASSISTANT

## 2024-03-11 PROCEDURE — C1776 JOINT DEVICE (IMPLANTABLE): HCPCS | Performed by: STUDENT IN AN ORGANIZED HEALTH CARE EDUCATION/TRAINING PROGRAM

## 2024-03-11 PROCEDURE — 85027 COMPLETE CBC AUTOMATED: CPT | Performed by: NURSE PRACTITIONER

## 2024-03-11 PROCEDURE — C1713 ANCHOR/SCREW BN/BN,TIS/BN: HCPCS | Performed by: STUDENT IN AN ORGANIZED HEALTH CARE EDUCATION/TRAINING PROGRAM

## 2024-03-11 PROCEDURE — 80048 BASIC METABOLIC PNL TOTAL CA: CPT | Performed by: NURSE PRACTITIONER

## 2024-03-11 PROCEDURE — 3700000002 HC GENERAL ANESTHESIA TIME - EACH INCREMENTAL 1 MINUTE: Performed by: STUDENT IN AN ORGANIZED HEALTH CARE EDUCATION/TRAINING PROGRAM

## 2024-03-11 PROCEDURE — 3600000018 HC OR TIME - INITIAL BASE CHARGE - PROCEDURE LEVEL SIX: Performed by: STUDENT IN AN ORGANIZED HEALTH CARE EDUCATION/TRAINING PROGRAM

## 2024-03-11 PROCEDURE — 1200000002 HC GENERAL ROOM WITH TELEMETRY DAILY

## 2024-03-11 PROCEDURE — 73552 X-RAY EXAM OF FEMUR 2/>: CPT | Mod: LEFT SIDE | Performed by: RADIOLOGY

## 2024-03-11 PROCEDURE — P9045 ALBUMIN (HUMAN), 5%, 250 ML: HCPCS | Mod: JZ

## 2024-03-11 PROCEDURE — 2720000007 HC OR 272 NO HCPCS: Performed by: STUDENT IN AN ORGANIZED HEALTH CARE EDUCATION/TRAINING PROGRAM

## 2024-03-11 PROCEDURE — 7100000002 HC RECOVERY ROOM TIME - EACH INCREMENTAL 1 MINUTE: Performed by: STUDENT IN AN ORGANIZED HEALTH CARE EDUCATION/TRAINING PROGRAM

## 2024-03-11 PROCEDURE — 27507 TREATMENT OF THIGH FRACTURE: CPT | Performed by: STUDENT IN AN ORGANIZED HEALTH CARE EDUCATION/TRAINING PROGRAM

## 2024-03-11 PROCEDURE — 2500000005 HC RX 250 GENERAL PHARMACY W/O HCPCS: Performed by: STUDENT IN AN ORGANIZED HEALTH CARE EDUCATION/TRAINING PROGRAM

## 2024-03-11 PROCEDURE — 2780000003 HC OR 278 NO HCPCS: Performed by: STUDENT IN AN ORGANIZED HEALTH CARE EDUCATION/TRAINING PROGRAM

## 2024-03-11 PROCEDURE — 2500000005 HC RX 250 GENERAL PHARMACY W/O HCPCS: Performed by: ANESTHESIOLOGIST ASSISTANT

## 2024-03-11 PROCEDURE — 73502 X-RAY EXAM HIP UNI 2-3 VIEWS: CPT | Mod: LT

## 2024-03-11 PROCEDURE — A4217 STERILE WATER/SALINE, 500 ML: HCPCS | Performed by: STUDENT IN AN ORGANIZED HEALTH CARE EDUCATION/TRAINING PROGRAM

## 2024-03-11 PROCEDURE — 0SRS03Z REPLACEMENT OF LEFT HIP JOINT, FEMORAL SURFACE WITH CERAMIC SYNTHETIC SUBSTITUTE, OPEN APPROACH: ICD-10-PCS | Performed by: STUDENT IN AN ORGANIZED HEALTH CARE EDUCATION/TRAINING PROGRAM

## 2024-03-11 PROCEDURE — P9016 RBC LEUKOCYTES REDUCED: HCPCS

## 2024-03-11 PROCEDURE — 7100000001 HC RECOVERY ROOM TIME - INITIAL BASE CHARGE: Performed by: STUDENT IN AN ORGANIZED HEALTH CARE EDUCATION/TRAINING PROGRAM

## 2024-03-11 PROCEDURE — 85027 COMPLETE CBC AUTOMATED: CPT | Performed by: STUDENT IN AN ORGANIZED HEALTH CARE EDUCATION/TRAINING PROGRAM

## 2024-03-11 DEVICE — FEMORAL HEAD, CERAMIC 36 +8.5: Type: IMPLANTABLE DEVICE | Site: HIP | Status: FUNCTIONAL

## 2024-03-11 DEVICE — IMPLANTABLE DEVICE: Type: IMPLANTABLE DEVICE | Site: HIP | Status: FUNCTIONAL

## 2024-03-11 DEVICE — 1.7MM CABLE WITH CRIMP 750MM-STERILE: Type: IMPLANTABLE DEVICE | Site: HIP | Status: FUNCTIONAL

## 2024-03-11 RX ORDER — PHENYLEPHRINE HCL IN 0.9% NACL 1 MG/10 ML
SYRINGE (ML) INTRAVENOUS AS NEEDED
Status: DISCONTINUED | OUTPATIENT
Start: 2024-03-11 | End: 2024-03-11

## 2024-03-11 RX ORDER — SODIUM CHLORIDE, SODIUM LACTATE, POTASSIUM CHLORIDE, CALCIUM CHLORIDE 600; 310; 30; 20 MG/100ML; MG/100ML; MG/100ML; MG/100ML
INJECTION, SOLUTION INTRAVENOUS CONTINUOUS PRN
Status: DISCONTINUED | OUTPATIENT
Start: 2024-03-11 | End: 2024-03-11

## 2024-03-11 RX ORDER — SODIUM CHLORIDE, SODIUM LACTATE, POTASSIUM CHLORIDE, CALCIUM CHLORIDE 600; 310; 30; 20 MG/100ML; MG/100ML; MG/100ML; MG/100ML
100 INJECTION, SOLUTION INTRAVENOUS CONTINUOUS
Status: DISCONTINUED | OUTPATIENT
Start: 2024-03-11 | End: 2024-03-12 | Stop reason: HOSPADM

## 2024-03-11 RX ORDER — HYDROMORPHONE HYDROCHLORIDE 1 MG/ML
INJECTION, SOLUTION INTRAMUSCULAR; INTRAVENOUS; SUBCUTANEOUS AS NEEDED
Status: DISCONTINUED | OUTPATIENT
Start: 2024-03-11 | End: 2024-03-11

## 2024-03-11 RX ORDER — LIDOCAINE HYDROCHLORIDE 20 MG/ML
INJECTION, SOLUTION INFILTRATION; PERINEURAL AS NEEDED
Status: DISCONTINUED | OUTPATIENT
Start: 2024-03-11 | End: 2024-03-11

## 2024-03-11 RX ORDER — PROPOFOL 10 MG/ML
INJECTION, EMULSION INTRAVENOUS AS NEEDED
Status: DISCONTINUED | OUTPATIENT
Start: 2024-03-11 | End: 2024-03-11

## 2024-03-11 RX ORDER — METOPROLOL TARTRATE 1 MG/ML
INJECTION, SOLUTION INTRAVENOUS AS NEEDED
Status: DISCONTINUED | OUTPATIENT
Start: 2024-03-11 | End: 2024-03-11

## 2024-03-11 RX ORDER — FLUCONAZOLE 2 MG/ML
200 INJECTION, SOLUTION INTRAVENOUS ONCE
Status: DISCONTINUED | OUTPATIENT
Start: 2024-03-11 | End: 2024-03-12

## 2024-03-11 RX ORDER — GLYCOPYRROLATE 0.2 MG/ML
INJECTION INTRAMUSCULAR; INTRAVENOUS AS NEEDED
Status: DISCONTINUED | OUTPATIENT
Start: 2024-03-11 | End: 2024-03-11

## 2024-03-11 RX ORDER — ONDANSETRON HYDROCHLORIDE 2 MG/ML
INJECTION, SOLUTION INTRAVENOUS AS NEEDED
Status: DISCONTINUED | OUTPATIENT
Start: 2024-03-11 | End: 2024-03-11

## 2024-03-11 RX ORDER — VANCOMYCIN HYDROCHLORIDE 1 G/20ML
INJECTION, POWDER, LYOPHILIZED, FOR SOLUTION INTRAVENOUS DAILY PRN
Status: DISCONTINUED | OUTPATIENT
Start: 2024-03-11 | End: 2024-03-12

## 2024-03-11 RX ORDER — MIDAZOLAM HYDROCHLORIDE 1 MG/ML
INJECTION INTRAMUSCULAR; INTRAVENOUS AS NEEDED
Status: DISCONTINUED | OUTPATIENT
Start: 2024-03-11 | End: 2024-03-11

## 2024-03-11 RX ORDER — ONDANSETRON HYDROCHLORIDE 2 MG/ML
4 INJECTION, SOLUTION INTRAVENOUS ONCE AS NEEDED
Status: DISCONTINUED | OUTPATIENT
Start: 2024-03-11 | End: 2024-03-12 | Stop reason: HOSPADM

## 2024-03-11 RX ORDER — FLUCONAZOLE 2 MG/ML
INJECTION, SOLUTION INTRAVENOUS AS NEEDED
Status: DISCONTINUED | OUTPATIENT
Start: 2024-03-11 | End: 2024-03-11

## 2024-03-11 RX ORDER — NYSTATIN 100000 U/G
CREAM TOPICAL ONCE
Status: DISCONTINUED | OUTPATIENT
Start: 2024-03-11 | End: 2024-03-12

## 2024-03-11 RX ORDER — DIPHENHYDRAMINE HYDROCHLORIDE 50 MG/ML
12.5 INJECTION INTRAMUSCULAR; INTRAVENOUS ONCE AS NEEDED
Status: DISCONTINUED | OUTPATIENT
Start: 2024-03-11 | End: 2024-03-12 | Stop reason: HOSPADM

## 2024-03-11 RX ORDER — CEFAZOLIN 1 G/1
INJECTION, POWDER, FOR SOLUTION INTRAVENOUS AS NEEDED
Status: DISCONTINUED | OUTPATIENT
Start: 2024-03-11 | End: 2024-03-11

## 2024-03-11 RX ORDER — LABETALOL HYDROCHLORIDE 5 MG/ML
5 INJECTION, SOLUTION INTRAVENOUS ONCE AS NEEDED
Status: DISCONTINUED | OUTPATIENT
Start: 2024-03-11 | End: 2024-03-12 | Stop reason: HOSPADM

## 2024-03-11 RX ORDER — SODIUM CHLORIDE 0.9 G/100ML
IRRIGANT IRRIGATION AS NEEDED
Status: DISCONTINUED | OUTPATIENT
Start: 2024-03-11 | End: 2024-03-11 | Stop reason: HOSPADM

## 2024-03-11 RX ORDER — ESMOLOL HYDROCHLORIDE 10 MG/ML
INJECTION INTRAVENOUS AS NEEDED
Status: DISCONTINUED | OUTPATIENT
Start: 2024-03-11 | End: 2024-03-11

## 2024-03-11 RX ORDER — LIDOCAINE HYDROCHLORIDE 10 MG/ML
0.1 INJECTION, SOLUTION EPIDURAL; INFILTRATION; INTRACAUDAL; PERINEURAL ONCE
Status: DISCONTINUED | OUTPATIENT
Start: 2024-03-11 | End: 2024-03-12 | Stop reason: HOSPADM

## 2024-03-11 RX ORDER — VANCOMYCIN HYDROCHLORIDE 1 G/20ML
INJECTION, POWDER, LYOPHILIZED, FOR SOLUTION INTRAVENOUS AS NEEDED
Status: DISCONTINUED | OUTPATIENT
Start: 2024-03-11 | End: 2024-03-11 | Stop reason: HOSPADM

## 2024-03-11 RX ORDER — OXYCODONE HYDROCHLORIDE 5 MG/1
5 TABLET ORAL EVERY 4 HOURS PRN
Status: DISCONTINUED | OUTPATIENT
Start: 2024-03-11 | End: 2024-03-12 | Stop reason: HOSPADM

## 2024-03-11 RX ORDER — FENTANYL CITRATE 50 UG/ML
INJECTION, SOLUTION INTRAMUSCULAR; INTRAVENOUS AS NEEDED
Status: DISCONTINUED | OUTPATIENT
Start: 2024-03-11 | End: 2024-03-11

## 2024-03-11 RX ORDER — VANCOMYCIN HYDROCHLORIDE 1 G/20ML
INJECTION, POWDER, LYOPHILIZED, FOR SOLUTION INTRAVENOUS AS NEEDED
Status: DISCONTINUED | OUTPATIENT
Start: 2024-03-11 | End: 2024-03-11

## 2024-03-11 RX ORDER — ALBUMIN HUMAN 50 G/1000ML
SOLUTION INTRAVENOUS AS NEEDED
Status: DISCONTINUED | OUTPATIENT
Start: 2024-03-11 | End: 2024-03-11

## 2024-03-11 RX ORDER — ROCURONIUM BROMIDE 10 MG/ML
INJECTION, SOLUTION INTRAVENOUS AS NEEDED
Status: DISCONTINUED | OUTPATIENT
Start: 2024-03-11 | End: 2024-03-11

## 2024-03-11 RX ORDER — TRANEXAMIC ACID 100 MG/ML
INJECTION, SOLUTION INTRAVENOUS AS NEEDED
Status: DISCONTINUED | OUTPATIENT
Start: 2024-03-11 | End: 2024-03-11

## 2024-03-11 RX ORDER — PHENYLEPHRINE 10 MG/250 ML(40 MCG/ML)IN 0.9 % SOD.CHLORIDE INTRAVENOUS
CONTINUOUS PRN
Status: DISCONTINUED | OUTPATIENT
Start: 2024-03-11 | End: 2024-03-11

## 2024-03-11 RX ADMIN — VANCOMYCIN HYDROCHLORIDE 1000 MG: 1 INJECTION, POWDER, LYOPHILIZED, FOR SOLUTION INTRAVENOUS at 14:47

## 2024-03-11 RX ADMIN — Medication 200 MCG: at 14:31

## 2024-03-11 RX ADMIN — Medication 200 MCG: at 16:49

## 2024-03-11 RX ADMIN — ALBUMIN HUMAN 250 ML: 0.05 INJECTION, SOLUTION INTRAVENOUS at 17:12

## 2024-03-11 RX ADMIN — FENTANYL CITRATE 50 MCG: 50 INJECTION, SOLUTION INTRAMUSCULAR; INTRAVENOUS at 16:38

## 2024-03-11 RX ADMIN — SUGAMMADEX 200 MG: 100 INJECTION, SOLUTION INTRAVENOUS at 16:34

## 2024-03-11 RX ADMIN — ROCURONIUM BROMIDE 20 MG: 10 INJECTION, SOLUTION INTRAVENOUS at 15:23

## 2024-03-11 RX ADMIN — FLUCONAZOLE 200 MG: 400 INJECTION, SOLUTION INTRAVENOUS at 17:04

## 2024-03-11 RX ADMIN — Medication 100 MCG: at 18:20

## 2024-03-11 RX ADMIN — ROCURONIUM BROMIDE 80 MG: 10 INJECTION, SOLUTION INTRAVENOUS at 14:16

## 2024-03-11 RX ADMIN — METOPROLOL TARTRATE 5 MG: 1 INJECTION, SOLUTION INTRAVENOUS at 16:23

## 2024-03-11 RX ADMIN — SODIUM CHLORIDE, POTASSIUM CHLORIDE, SODIUM LACTATE AND CALCIUM CHLORIDE: 600; 310; 30; 20 INJECTION, SOLUTION INTRAVENOUS at 16:03

## 2024-03-11 RX ADMIN — HYDROMORPHONE HYDROCHLORIDE 0.4 MG: 1 INJECTION, SOLUTION INTRAMUSCULAR; INTRAVENOUS; SUBCUTANEOUS at 03:22

## 2024-03-11 RX ADMIN — Medication 0.55 MCG/KG/MIN: at 14:34

## 2024-03-11 RX ADMIN — HYDROMORPHONE HYDROCHLORIDE 0.5 MG: 1 INJECTION, SOLUTION INTRAMUSCULAR; INTRAVENOUS; SUBCUTANEOUS at 20:46

## 2024-03-11 RX ADMIN — PROPOFOL 40 MG: 10 INJECTION, EMULSION INTRAVENOUS at 14:41

## 2024-03-11 RX ADMIN — ONDANSETRON 4 MG: 2 INJECTION, SOLUTION INTRAMUSCULAR; INTRAVENOUS at 19:42

## 2024-03-11 RX ADMIN — Medication 10 L/MIN: at 21:15

## 2024-03-11 RX ADMIN — PROPOFOL 50 MG: 10 INJECTION, EMULSION INTRAVENOUS at 16:38

## 2024-03-11 RX ADMIN — FENTANYL CITRATE 50 MCG: 50 INJECTION, SOLUTION INTRAMUSCULAR; INTRAVENOUS at 16:04

## 2024-03-11 RX ADMIN — FENTANYL CITRATE 25 MCG: 50 INJECTION, SOLUTION INTRAMUSCULAR; INTRAVENOUS at 18:12

## 2024-03-11 RX ADMIN — LIDOCAINE HYDROCHLORIDE 100 MG: 20 INJECTION, SOLUTION INFILTRATION; PERINEURAL at 14:16

## 2024-03-11 RX ADMIN — Medication 200 MCG: at 16:09

## 2024-03-11 RX ADMIN — ESMOLOL HYDROCHLORIDE 50 MG: 10 INJECTION, SOLUTION INTRAVENOUS at 14:55

## 2024-03-11 RX ADMIN — SODIUM CHLORIDE, POTASSIUM CHLORIDE, SODIUM LACTATE AND CALCIUM CHLORIDE: 600; 310; 30; 20 INJECTION, SOLUTION INTRAVENOUS at 20:00

## 2024-03-11 RX ADMIN — Medication 100 MCG: at 16:39

## 2024-03-11 RX ADMIN — HYDROMORPHONE HYDROCHLORIDE 0.4 MG: 1 INJECTION, SOLUTION INTRAMUSCULAR; INTRAVENOUS; SUBCUTANEOUS at 08:32

## 2024-03-11 RX ADMIN — CEFAZOLIN 2 G: 330 INJECTION, POWDER, FOR SOLUTION INTRAMUSCULAR; INTRAVENOUS at 14:22

## 2024-03-11 RX ADMIN — METOPROLOL TARTRATE 5 MG: 1 INJECTION, SOLUTION INTRAVENOUS at 14:58

## 2024-03-11 RX ADMIN — ROCURONIUM BROMIDE 30 MG: 10 INJECTION, SOLUTION INTRAVENOUS at 18:47

## 2024-03-11 RX ADMIN — SODIUM CHLORIDE, POTASSIUM CHLORIDE, SODIUM LACTATE AND CALCIUM CHLORIDE: 600; 310; 30; 20 INJECTION, SOLUTION INTRAVENOUS at 14:09

## 2024-03-11 RX ADMIN — Medication 200 MCG: at 14:21

## 2024-03-11 RX ADMIN — Medication 100 MCG: at 20:27

## 2024-03-11 RX ADMIN — Medication 200 MCG: at 17:27

## 2024-03-11 RX ADMIN — CEFAZOLIN 2 G: 330 INJECTION, POWDER, FOR SOLUTION INTRAMUSCULAR; INTRAVENOUS at 18:22

## 2024-03-11 RX ADMIN — Medication 200 MCG: at 14:16

## 2024-03-11 RX ADMIN — FENTANYL CITRATE 25 MCG: 50 INJECTION, SOLUTION INTRAMUSCULAR; INTRAVENOUS at 18:03

## 2024-03-11 RX ADMIN — Medication 200 MCG: at 20:49

## 2024-03-11 RX ADMIN — METOPROLOL TARTRATE 25 MG: 25 TABLET, FILM COATED ORAL at 08:27

## 2024-03-11 RX ADMIN — DEXAMETHASONE SODIUM PHOSPHATE 8 MG: 4 INJECTION, SOLUTION INTRAMUSCULAR; INTRAVENOUS at 15:15

## 2024-03-11 RX ADMIN — ROCURONIUM BROMIDE 20 MG: 10 INJECTION, SOLUTION INTRAVENOUS at 16:04

## 2024-03-11 RX ADMIN — HYDROMORPHONE HYDROCHLORIDE 0.5 MG: 1 INJECTION, SOLUTION INTRAMUSCULAR; INTRAVENOUS; SUBCUTANEOUS at 21:09

## 2024-03-11 RX ADMIN — Medication 200 MCG: at 19:32

## 2024-03-11 RX ADMIN — MIDAZOLAM HYDROCHLORIDE 2 MG: 1 INJECTION, SOLUTION INTRAMUSCULAR; INTRAVENOUS at 14:01

## 2024-03-11 RX ADMIN — PROPOFOL 110 MG: 10 INJECTION, EMULSION INTRAVENOUS at 14:16

## 2024-03-11 RX ADMIN — FENTANYL CITRATE 50 MCG: 50 INJECTION, SOLUTION INTRAMUSCULAR; INTRAVENOUS at 14:01

## 2024-03-11 RX ADMIN — ROCURONIUM BROMIDE 20 MG: 10 INJECTION, SOLUTION INTRAVENOUS at 18:27

## 2024-03-11 RX ADMIN — ESMOLOL HYDROCHLORIDE 50 MG: 10 INJECTION, SOLUTION INTRAVENOUS at 14:21

## 2024-03-11 RX ADMIN — Medication 200 MCG: at 19:51

## 2024-03-11 RX ADMIN — SUGAMMADEX 200 MG: 100 INJECTION, SOLUTION INTRAVENOUS at 20:36

## 2024-03-11 RX ADMIN — MORPHINE SULFATE 2 MG: 2 INJECTION, SOLUTION INTRAMUSCULAR; INTRAVENOUS at 00:06

## 2024-03-11 RX ADMIN — GLYCOPYRROLATE 0.2 MG: 0.2 INJECTION INTRAMUSCULAR; INTRAVENOUS at 14:01

## 2024-03-11 RX ADMIN — Medication 100 MCG: at 18:12

## 2024-03-11 RX ADMIN — TRANEXAMIC ACID 1000 MG: 100 INJECTION, SOLUTION INTRAVENOUS at 14:22

## 2024-03-11 RX ADMIN — SODIUM CHLORIDE, POTASSIUM CHLORIDE, SODIUM LACTATE AND CALCIUM CHLORIDE 100 ML/HR: 600; 310; 30; 20 INJECTION, SOLUTION INTRAVENOUS at 21:15

## 2024-03-11 ASSESSMENT — PAIN SCALES - GENERAL
PAINLEVEL_OUTOF10: 0 - NO PAIN
PAINLEVEL_OUTOF10: 5 - MODERATE PAIN
PAINLEVEL_OUTOF10: 7
PAINLEVEL_OUTOF10: 7
PAINLEVEL_OUTOF10: 0 - NO PAIN
PAINLEVEL_OUTOF10: 5 - MODERATE PAIN
PAINLEVEL_OUTOF10: 4
PAINLEVEL_OUTOF10: 5 - MODERATE PAIN
PAINLEVEL_OUTOF10: 3
PAINLEVEL_OUTOF10: 5 - MODERATE PAIN
PAINLEVEL_OUTOF10: 4
PAINLEVEL_OUTOF10: 6

## 2024-03-11 ASSESSMENT — PAIN - FUNCTIONAL ASSESSMENT
PAIN_FUNCTIONAL_ASSESSMENT: 0-10
PAIN_FUNCTIONAL_ASSESSMENT: UNABLE TO SELF-REPORT
PAIN_FUNCTIONAL_ASSESSMENT: 0-10
PAIN_FUNCTIONAL_ASSESSMENT: UNABLE TO SELF-REPORT
PAIN_FUNCTIONAL_ASSESSMENT: 0-10
PAIN_FUNCTIONAL_ASSESSMENT: UNABLE TO SELF-REPORT
PAIN_FUNCTIONAL_ASSESSMENT: 0-10

## 2024-03-11 ASSESSMENT — PAIN DESCRIPTION - DESCRIPTORS
DESCRIPTORS: DISCOMFORT
DESCRIPTORS: ACHING
DESCRIPTORS: ACHING
DESCRIPTORS: DISCOMFORT

## 2024-03-11 ASSESSMENT — PAIN DESCRIPTION - LOCATION: LOCATION: HIP

## 2024-03-11 NOTE — PROGRESS NOTES
S: NAEON. Understands plan for surgery today and feels ready.          O  VS:  Temp:  [36.5 °C (97.7 °F)-36.9 °C (98.4 °F)] 36.5 °C (97.7 °F)  Heart Rate:  [] 113  Resp:  [15-18] 18  BP: ()/(54-66) 101/66    Gen: NAD  Focused exam of LLE remains unchanged  -Grossly intact  -Palpable DP/PT pulse (only 1+, but symmetric to RLE), BCR  -Skin no open wounds, tenting  -TTP about hip  -Log roll deferred given known injury  -Compartments soft and compressible, no additional pain with passive stretch.  -Motor: Fires quads, TA, EHL, GCS  -Sensory: SILT in saph/jose armando/sp/dp/t nerve distributions.     Drain  Na  Micro  Na  PT/OT  Na until after surgery  Labs (pertinent)  Hgb 10.6  INR 1.3  Platelets 97  Imaging  No new imaging          A&P: José Barth is a 79 y.o. male presenting with:  Left nondisplaced LC1 fracture  Discussed with that this pattern is typically treated with progressive WBAT with a walker. Pt agreed with this plan.  L GITA periprosthetic femur fracture  Preop care  NWB LLE  DVT ppx, anticoag: Prefer DVT ppx with SCDs and LVX (held midnight preop). Baseline Afib on coumadin, which has been reversed. Please maintain below 1.5.  Preop risk evaluation completed by IM and appreciated (moderate risk)  Surgical considerations  Concurrent PJI: Is a possibility with periprosthetic fx. Testing and treatment guidelines are not well established.  In this case, there is low suspicion based on a lack of prodromal symptoms and multiple previous serial radiographs without signs of chronic loosening. ESR (6) and CRP (131mg/L) are sensitive but not specific. Intraop cultures will be obtained.  Timing: Planning for surgery today. After discussion with several senior partners regarding their advice and availability, decided that a case of this complexity at this facility is best done during the week for both OR logistics reasons and for  availability if intra-op assistance is desired. Therefore,  will plan for OR Monday. Discussed with patient the increased morbidity/mortality/risks of waiting and how these must be balanced with optimizing the chance of a successful procedure. He understands and agrees.  Blood mgmt: Pt will likely require transfusion given current Hgb. Discussed platelet decrease with IM team, thought to be from LVX. Improved today.  Dispo  Pending operative course  Given injury, please ensure patient follows up with PCP and/or endocrinology for bone density assessment and possible treatment to prevent future injuries.  F/u with Dr. Ruffin in clinic in 2wks after dc  Patient verbalized understanding. All questions answered.

## 2024-03-11 NOTE — ANESTHESIA PROCEDURE NOTES
Airway  Date/Time: 3/11/2024 2:19 PM  Urgency: elective    Airway not difficult    Staffing  Performed: [facility]   Authorized by: Alessandro Mcdowell MD    Performed by: KALI Severino  Patient location during procedure: OR    Indications and Patient Condition  Indications for airway management: anesthesia  Spontaneous ventilation: present  Sedation level: deep  Preoxygenated: yes  Patient position: sniffing  Mask difficulty assessment: 1 - vent by mask    Final Airway Details  Final airway type: endotracheal airway      Successful airway: ETT  Cuffed: yes   Successful intubation technique: direct laryngoscopy  Facilitating devices/methods: intubating stylet  Endotracheal tube insertion site: oral  Blade: Mesfin  Blade size: #4  ETT size (mm): 7.5  Cormack-Lehane Classification: grade I - full view of glottis  Placement verified by: chest auscultation   Measured from: lips  ETT to lips (cm): 21  Number of attempts at approach: 1  Ventilation between attempts: BVM    Additional Comments  Easy by KALI

## 2024-03-11 NOTE — ANESTHESIA PREPROCEDURE EVALUATION
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      Musculoskeletal   (+) Osteoarthritis      Other   (+) Arthritis of left hip       Clinical information reviewed:   Tobacco  Allergies  Meds   Med Hx  Surg Hx   Fam Hx  Soc Hx        NPO Detail:  No data recorded     Physical Exam    Airway  Mallampati: II  TM distance: >3 FB  Neck ROM: full     Cardiovascular   Rhythm: regular  Rate: normal     Dental    Pulmonary   Breath sounds clear to auscultation     Abdominal            Anesthesia Plan    History of general anesthesia?: yes  History of complications of general anesthesia?: no    ASA 3     general     intravenous induction   Anesthetic plan and risks discussed with patient.    Plan discussed with CRNA.

## 2024-03-11 NOTE — ANESTHESIA PROCEDURE NOTES
Peripheral IV  Date/Time: 3/11/2024 2:30 PM  Inserted by: Alessandro Mcdowell MD    Placement  Needle size: 18 G  Laterality: left  Location: hand  Local anesthetic: none  Technique: anatomical landmarks  Attempts: 1

## 2024-03-11 NOTE — CARE PLAN
The patient's goals for the shift include      The clinical goals for the shift include safety    Over the shift, the patient did not make progress toward the following goals. Barriers to progression include . Recommendations to address these barriers include   Problem: Pain  Goal: My pain/discomfort is manageable  Outcome: Progressing     Problem: Safety  Goal: Patient will be injury free during hospitalization  Outcome: Progressing  Goal: I will remain free of falls  Outcome: Progressing     Problem: Daily Care  Goal: Daily care needs are met  Outcome: Progressing     Problem: Psychosocial Needs  Goal: Demonstrates ability to cope with hospitalization/illness  Outcome: Progressing  Goal: Collaborate with me, my family, and caregiver to identify my specific goals  Outcome: Progressing     Problem: Discharge Barriers  Goal: My discharge needs are met  Outcome: Progressing     Problem: Skin  Goal: Decreased wound size/increased tissue granulation at next dressing change  Outcome: Progressing  Goal: Prevent/manage excess moisture  Outcome: Progressing  Goal: Promote/optimize nutrition  Outcome: Progressing   .

## 2024-03-11 NOTE — CARE PLAN
Problem: Skin  Goal: Decreased wound size/increased tissue granulation at next dressing change  3/11/2024 1108 by Kaylin Parra RN  Outcome: Progressing  3/11/2024 1108 by Kaylin Parra RN  Outcome: Progressing  Goal: Prevent/manage excess moisture  3/11/2024 1108 by Kaylin Parra RN  Outcome: Progressing  3/11/2024 1108 by Kaylin Parra RN  Outcome: Progressing  Goal: Promote/optimize nutrition  3/11/2024 1108 by Kaylin Parra RN  Outcome: Progressing  3/11/2024 1108 by Kaylin Parra RN  Outcome: Progressing   The patient's goals for the shift include      The clinical goals for the shift include safety    Over the shift, the patient did not make progress toward the following goals. Barriers to progression include . Recommendations to address these barriers include   Problem: Skin  Goal: Decreased wound size/increased tissue granulation at next dressing change  3/11/2024 1108 by Kaylin Parra RN  Outcome: Progressing  3/11/2024 1108 by Kaylin Parra RN  Outcome: Progressing  Goal: Prevent/manage excess moisture  3/11/2024 1108 by Kaylin Parra RN  Outcome: Progressing  3/11/2024 1108 by Kaylin Parra RN  Outcome: Progressing  Goal: Promote/optimize nutrition  3/11/2024 1108 by Kaylin Parra RN  Outcome: Progressing  3/11/2024 1108 by Kaylin Parra RN  Outcome: Progressing   .

## 2024-03-11 NOTE — PROGRESS NOTES
03/11/24 1303   Discharge Planning   Living Arrangements Spouse/significant other     Patient to go to surgery today for his left hip fracture.  SNF choices have been obtained.  FOC: [facility]  PLAN: surgery, PT, OT  DISP: TBD, if SNF  will need insurance auth.  ADOD: 2-3 days  Millie Levy RN

## 2024-03-11 NOTE — ANESTHESIA PROCEDURE NOTES
Peripheral IV  Date/Time: 3/11/2024 2:00 PM  Inserted by: KALI Severino    Placement  Needle size: 20 G  Laterality: right  Location: forearm  Technique: anatomical landmarks  Attempts: 1

## 2024-03-12 LAB
ANION GAP SERPL CALC-SCNC: 12 MMOL/L (ref 10–20)
ATRIAL RATE: 138 BPM
BUN SERPL-MCNC: 21 MG/DL (ref 6–23)
CALCIUM SERPL-MCNC: 7 MG/DL (ref 8.6–10.3)
CHLORIDE SERPL-SCNC: 103 MMOL/L (ref 98–107)
CO2 SERPL-SCNC: 23 MMOL/L (ref 21–32)
CREAT SERPL-MCNC: 0.86 MG/DL (ref 0.5–1.3)
EGFRCR SERPLBLD CKD-EPI 2021: 88 ML/MIN/1.73M*2
ERYTHROCYTE [DISTWIDTH] IN BLOOD BY AUTOMATED COUNT: 14.2 % (ref 11.5–14.5)
GLUCOSE SERPL-MCNC: 201 MG/DL (ref 74–99)
HCT VFR BLD AUTO: 27.7 % (ref 41–52)
HGB BLD-MCNC: 9.2 G/DL (ref 13.5–17.5)
INR PPP: 1.6 (ref 0.9–1.1)
MCH RBC QN AUTO: 32.5 PG (ref 26–34)
MCHC RBC AUTO-ENTMCNC: 33.2 G/DL (ref 32–36)
MCV RBC AUTO: 98 FL (ref 80–100)
NRBC BLD-RTO: 0 /100 WBCS (ref 0–0)
PLATELET # BLD AUTO: 97 X10*3/UL (ref 150–450)
POTASSIUM SERPL-SCNC: 4.3 MMOL/L (ref 3.5–5.3)
PROTHROMBIN TIME: 18.2 SECONDS (ref 9.8–12.8)
Q ONSET: 213 MS
QRS COUNT: 23 BEATS
QRS DURATION: 78 MS
QT INTERVAL: 272 MS
QTC CALCULATION(BAZETT): 412 MS
QTC FREDERICIA: 359 MS
R AXIS: -24 DEGREES
RBC # BLD AUTO: 2.83 X10*6/UL (ref 4.5–5.9)
SODIUM SERPL-SCNC: 134 MMOL/L (ref 136–145)
T AXIS: -64 DEGREES
T OFFSET: 349 MS
VENTRICULAR RATE: 138 BPM
WBC # BLD AUTO: 10.5 X10*3/UL (ref 4.4–11.3)

## 2024-03-12 PROCEDURE — 2500000001 HC RX 250 WO HCPCS SELF ADMINISTERED DRUGS (ALT 637 FOR MEDICARE OP): Performed by: NURSE PRACTITIONER

## 2024-03-12 PROCEDURE — 2500000001 HC RX 250 WO HCPCS SELF ADMINISTERED DRUGS (ALT 637 FOR MEDICARE OP)

## 2024-03-12 PROCEDURE — 85610 PROTHROMBIN TIME: CPT | Performed by: NURSE PRACTITIONER

## 2024-03-12 PROCEDURE — 2500000004 HC RX 250 GENERAL PHARMACY W/ HCPCS (ALT 636 FOR OP/ED)

## 2024-03-12 PROCEDURE — 97110 THERAPEUTIC EXERCISES: CPT | Mod: GP

## 2024-03-12 PROCEDURE — 1200000002 HC GENERAL ROOM WITH TELEMETRY DAILY

## 2024-03-12 PROCEDURE — 97166 OT EVAL MOD COMPLEX 45 MIN: CPT | Mod: GO

## 2024-03-12 PROCEDURE — 2500000004 HC RX 250 GENERAL PHARMACY W/ HCPCS (ALT 636 FOR OP/ED): Performed by: PHARMACIST

## 2024-03-12 PROCEDURE — 99232 SBSQ HOSP IP/OBS MODERATE 35: CPT | Performed by: INTERNAL MEDICINE

## 2024-03-12 PROCEDURE — 99222 1ST HOSP IP/OBS MODERATE 55: CPT | Performed by: INTERNAL MEDICINE

## 2024-03-12 PROCEDURE — 80048 BASIC METABOLIC PNL TOTAL CA: CPT | Performed by: NURSE PRACTITIONER

## 2024-03-12 PROCEDURE — 36415 COLL VENOUS BLD VENIPUNCTURE: CPT | Performed by: NURSE PRACTITIONER

## 2024-03-12 PROCEDURE — 97161 PT EVAL LOW COMPLEX 20 MIN: CPT | Mod: GP

## 2024-03-12 PROCEDURE — 85027 COMPLETE CBC AUTOMATED: CPT | Performed by: NURSE PRACTITIONER

## 2024-03-12 RX ORDER — NYSTATIN 100000 [USP'U]/G
1 POWDER TOPICAL 3 TIMES DAILY
Status: DISCONTINUED | OUTPATIENT
Start: 2024-03-12 | End: 2024-03-13 | Stop reason: HOSPADM

## 2024-03-12 RX ORDER — ENOXAPARIN SODIUM 150 MG/ML
1.5 INJECTION SUBCUTANEOUS EVERY 24 HOURS
Status: DISCONTINUED | OUTPATIENT
Start: 2024-03-13 | End: 2024-03-13 | Stop reason: HOSPADM

## 2024-03-12 RX ORDER — OXYCODONE HYDROCHLORIDE 5 MG/1
10 TABLET ORAL EVERY 6 HOURS PRN
Status: DISCONTINUED | OUTPATIENT
Start: 2024-03-12 | End: 2024-03-13 | Stop reason: HOSPADM

## 2024-03-12 RX ORDER — METOPROLOL TARTRATE 25 MG/1
25 TABLET, FILM COATED ORAL 2 TIMES DAILY
Status: DISCONTINUED | OUTPATIENT
Start: 2024-03-12 | End: 2024-03-13 | Stop reason: HOSPADM

## 2024-03-12 RX ORDER — ENOXAPARIN SODIUM 100 MG/ML
40 INJECTION SUBCUTANEOUS EVERY 24 HOURS
Status: DISCONTINUED | OUTPATIENT
Start: 2024-03-12 | End: 2024-03-12

## 2024-03-12 RX ORDER — CEFAZOLIN SODIUM 2 G/100ML
2 INJECTION, SOLUTION INTRAVENOUS EVERY 8 HOURS
Status: DISCONTINUED | OUTPATIENT
Start: 2024-03-12 | End: 2024-03-13

## 2024-03-12 RX ORDER — FLUCONAZOLE 100 MG/1
200 TABLET ORAL DAILY
Status: DISCONTINUED | OUTPATIENT
Start: 2024-03-12 | End: 2024-03-13

## 2024-03-12 RX ORDER — OXYCODONE HYDROCHLORIDE 5 MG/1
5 TABLET ORAL EVERY 6 HOURS PRN
Status: DISCONTINUED | OUTPATIENT
Start: 2024-03-12 | End: 2024-03-13 | Stop reason: HOSPADM

## 2024-03-12 RX ORDER — ENOXAPARIN SODIUM 100 MG/ML
65 INJECTION SUBCUTANEOUS ONCE
Status: COMPLETED | OUTPATIENT
Start: 2024-03-12 | End: 2024-03-12

## 2024-03-12 RX ADMIN — CEFAZOLIN SODIUM 2 G: 2 INJECTION, SOLUTION INTRAVENOUS at 18:45

## 2024-03-12 RX ADMIN — FLUCONAZOLE 200 MG: 100 TABLET ORAL at 10:20

## 2024-03-12 RX ADMIN — OXYCODONE HYDROCHLORIDE 10 MG: 5 TABLET ORAL at 19:25

## 2024-03-12 RX ADMIN — ENOXAPARIN SODIUM 40 MG: 40 INJECTION SUBCUTANEOUS at 10:16

## 2024-03-12 RX ADMIN — OXYCODONE HYDROCHLORIDE 10 MG: 5 TABLET ORAL at 02:23

## 2024-03-12 RX ADMIN — CEFAZOLIN SODIUM 2 G: 2 INJECTION, SOLUTION INTRAVENOUS at 02:30

## 2024-03-12 RX ADMIN — BENZOCAINE AND MENTHOL 1 LOZENGE: 15; 3.6 LOZENGE ORAL at 03:40

## 2024-03-12 RX ADMIN — CEFAZOLIN SODIUM 2 G: 2 INJECTION, SOLUTION INTRAVENOUS at 10:16

## 2024-03-12 RX ADMIN — METOPROLOL TARTRATE 25 MG: 25 TABLET, FILM COATED ORAL at 14:20

## 2024-03-12 RX ADMIN — ENOXAPARIN SODIUM 65 MG: 80 INJECTION SUBCUTANEOUS at 14:20

## 2024-03-12 RX ADMIN — SODIUM CHLORIDE 50 ML/HR: 9 INJECTION, SOLUTION INTRAVENOUS at 14:20

## 2024-03-12 RX ADMIN — METOPROLOL TARTRATE 25 MG: 25 TABLET, FILM COATED ORAL at 23:43

## 2024-03-12 RX ADMIN — OXYCODONE HYDROCHLORIDE 5 MG: 5 TABLET ORAL at 14:22

## 2024-03-12 ASSESSMENT — PAIN SCALES - GENERAL
PAINLEVEL_OUTOF10: 7
PAINLEVEL_OUTOF10: 0 - NO PAIN
PAINLEVEL_OUTOF10: 0 - NO PAIN
PAINLEVEL_OUTOF10: 5 - MODERATE PAIN
PAINLEVEL_OUTOF10: 0 - NO PAIN
PAINLEVEL_OUTOF10: 1
PAINLEVEL_OUTOF10: 5 - MODERATE PAIN
PAINLEVEL_OUTOF10: 8
PAINLEVEL_OUTOF10: 8
PAINLEVEL_OUTOF10: 6
PAINLEVEL_OUTOF10: 7
PAINLEVEL_OUTOF10: 1
PAINLEVEL_OUTOF10: 0 - NO PAIN
PAINLEVEL_OUTOF10: 0 - NO PAIN
PAINLEVEL_OUTOF10: 3

## 2024-03-12 ASSESSMENT — COGNITIVE AND FUNCTIONAL STATUS - GENERAL
MOVING FROM LYING ON BACK TO SITTING ON SIDE OF FLAT BED WITH BEDRAILS: A LOT
MOVING TO AND FROM BED TO CHAIR: A LOT
EATING MEALS: A LITTLE
MOBILITY SCORE: 10
STANDING UP FROM CHAIR USING ARMS: A LOT
MOBILITY SCORE: 10
EATING MEALS: A LITTLE
WALKING IN HOSPITAL ROOM: TOTAL
TOILETING: TOTAL
HELP NEEDED FOR BATHING: A LOT
PERSONAL GROOMING: A LOT
DAILY ACTIVITIY SCORE: 11
PERSONAL GROOMING: A LOT
MOVING FROM LYING ON BACK TO SITTING ON SIDE OF FLAT BED WITH BEDRAILS: A LOT
EATING MEALS: A LITTLE
TOILETING: TOTAL
DRESSING REGULAR LOWER BODY CLOTHING: TOTAL
TOILETING: TOTAL
CLIMB 3 TO 5 STEPS WITH RAILING: TOTAL
MOVING FROM LYING ON BACK TO SITTING ON SIDE OF FLAT BED WITH BEDRAILS: A LOT
DRESSING REGULAR UPPER BODY CLOTHING: A LOT
MOVING TO AND FROM BED TO CHAIR: A LOT
WALKING IN HOSPITAL ROOM: TOTAL
HELP NEEDED FOR BATHING: A LOT
STANDING UP FROM CHAIR USING ARMS: A LOT
TURNING FROM BACK TO SIDE WHILE IN FLAT BAD: A LOT
STANDING UP FROM CHAIR USING ARMS: A LOT
WALKING IN HOSPITAL ROOM: TOTAL
TURNING FROM BACK TO SIDE WHILE IN FLAT BAD: A LOT
DRESSING REGULAR LOWER BODY CLOTHING: TOTAL
DAILY ACTIVITIY SCORE: 11
CLIMB 3 TO 5 STEPS WITH RAILING: TOTAL
HELP NEEDED FOR BATHING: A LOT
PERSONAL GROOMING: A LOT
MOBILITY SCORE: 10
DRESSING REGULAR UPPER BODY CLOTHING: A LOT
TURNING FROM BACK TO SIDE WHILE IN FLAT BAD: A LOT
DRESSING REGULAR UPPER BODY CLOTHING: A LOT
DAILY ACTIVITIY SCORE: 11
CLIMB 3 TO 5 STEPS WITH RAILING: TOTAL
DRESSING REGULAR LOWER BODY CLOTHING: TOTAL
MOVING TO AND FROM BED TO CHAIR: A LOT

## 2024-03-12 ASSESSMENT — PAIN DESCRIPTION - DESCRIPTORS
DESCRIPTORS: ACHING
DESCRIPTORS: SORE
DESCRIPTORS: ACHING
DESCRIPTORS: ACHING

## 2024-03-12 ASSESSMENT — PAIN DESCRIPTION - ORIENTATION: ORIENTATION: LEFT

## 2024-03-12 ASSESSMENT — ACTIVITIES OF DAILY LIVING (ADL)
ADL_ASSISTANCE: INDEPENDENT
ADL_ASSISTANCE: INDEPENDENT

## 2024-03-12 ASSESSMENT — PAIN DESCRIPTION - LOCATION: LOCATION: HIP

## 2024-03-12 NOTE — DISCHARGE INSTRUCTIONS
Wound care recommendations:  Cleanse region daily with soap and water and thoroughly pat dry.  Can use a hairdryer on cool setting to dry the region.  Apply Nystatin to redness and crusted lesions per prescription order.  Wear loose undergarment and change frequently for moisture and drainage.     Inner thigh, scrotum, groin: intertriginous dermatitis with fungal infection  2x per day: Bedside RN/LPN to complete wound care.  Cleanse with purple bath wipes or soap and water and pat THOROUGHLY par dry.  Apply no-sting barrier film to periwound skin.  Apply Nystatin cream to open redness and crusts in folds and satellight regions.  Apply Coloplast Triad Wound Dressing (orange tube) in dime thickness to the partial thickness wound are on left inner thigh and the scrotum.    Place Interdry to groin folds in single layer with at least 2 inches extended from fold.          Juno Ruffin MD  Adult Reconstruction and Joint Replacement  Office: [facility] (Phone [phone], Fax [phone], or [facility])      Thank you for choosing Dr. Ruffin for your joint replacement surgery. Below you will find instructions for after surgery in a variety of topics with answers to frequently asked questions. Please carefully read them and contact our office with questions.    Communication Methods  MyChart will continue to be the best way for us to communicate, but you may also call the office if you prefer.    Your Team and Whom to Contact  Dr. Ruffin's team is available to answer your questions. The best person to contact depends on your question and surgery location.  [facility] (contact information above): Scheduling office visits, prescription refills, medical concerns, leave of Absence, FMLA, or other paperwork.  Nursing Coordinators (If your surgery was at [facility]: Patricia Nagy RN ([phone])). If your surgery was at [place]: Irlanda Daniels MBA, RN-BC ([phone])): Nursing or wound care questions within 6 weeks  after surgery, prescription for outpatient physical therapy.  Sully Fernandes ([phone], [phone]) or Brooke Ansari ([phone]): Durable medical equipment (walker, braces, elevated toilet seat, etc.)    FMLA, Disability, and Return to Work Forms  Please submit all forms directly to [facility] (contact information above). Do not leave paperwork with the clinic staff.    Home Arrangements  For the first 3-5 days and nights after surgery, you will need a care partner (friend or family) with you at home. Alternatively, you may stay with friends or family. Research has shown that you will recover much better at home than at rehab.    Physical Therapy  All Patients  Physical therapy will begin in your home within 1-2 days after discharge. The care coordinator during your surgical stay should have arranged these services with a company of your choice before you were discharged from the hospital. Until starting home therapy, continue the exercises in your preoperative paperwork.  Depending on your condition, your team may have provided a knee immobilizer (stiff brace that keeps your leg straight). Wear this when ambulating until you feel your leg is strong enough to be safe without it.  Hip Replacements: Additional physical therapy outside your home is not required. In most cases, simply walking and gradually returning to activities is all that is needed. If you would like additional therapy, we are happy to prescribe it.  Knee Replacements: Additional physical therapy outside your home is required and should start 2-3 weeks after surgery.  Call the physical therapy location of your choice the day after you are discharged from the hospital to schedule the appointment well ahead of time and ensure there are no delays in starting outpatient therapy.    Clinic Appointments  2 weeks after surgery: If you have any skin stitches or staples, they will likely be removed at this time. This appointment should have been  scheduled before your surgery.  6 weeks after surgery: X-ray  1 year after surgery: X-rays  Every 5 years after surgery (5, 10, 15…): X-rays    Antibiotics before Dental Work and Other Medical Procedures/Surgeries  Now that you have a joint replacement, you will need to take oral antibiotics 1 hour before most medical procedures (including dental work and routine dental cleaning).  This could be true for 1 year after surgery or for the rest of your life, depending on a variety of factors. Ask your team for guidance in your particular case. These antibiotics can be prescribed by our office or by the provider performing the other procedure.    Restarting Your Home Diet and Medications  Resume your normal diet when you return home. Be sure to drink plenty of water and eat a substantial amount of protein (to help your body heal and rebuild) and fiber (to promote good bowel habits and prevent constipation).  Constipation is common from a combination of inactivity, anesthesia, and pain medication. In addition to the dietary recommendations above, physical activity such as walking also helps stimulate your bowels.   The PAT will advise you on restarting your medications. See your discharge paperwork for more details.    Goals and Limitations  Being aware of goals and limitations will help you take great care of your new joint. The below guidelines are for the first 3 months after surgery. After that time, please discuss increasing activities with the team.  All Patients  Unless the team tells you otherwise, you may put all of your weight on your new joint.  Activities to avoid: Sharp pivots, deep squats, jolting impact on your joint (running, jumping), and crossing your legs (Both lying and sitting. Have a pillow between your legs when sleeping on your side or rolling over.)  Knee Replacements:   Activities to avoid: Kneeling.  Range of Motion Goal: Minimum of 90 degrees with eventual progression to as much as 120 with  continued exercise and time.  Hip Replacements:  Anterior hip replacement activities to avoid: Large steps backwards, pointing your toes away from the middle of your body, lying on your stomach, and stretching/arching your back  Posterior hip replacement activities to avoid: Bending forward at the waist to  items or put on shoes/socks/underwear (instead have your arms between your legs and bend at your knees), bending your hip more than 90 degrees when getting on/off of low chairs, toilets, and sofas (make sure you knees are always below the level of your hip and use your arms to help you up instead of leaning forward), and pointing your toes inwards    Wound Care and Personal Hygiene  Your rubber bandage is waterproof. You may shower with it on. Your home therapist will remove it 1 week after surgery. You can continue to shower, letting water run over it and lightly cleaning with regular soap (do not scrub). Do not bath, swim, or soak your incision until your team tells you it is okay (typically at least 6 weeks after surgery).  If you have continued drainage or bleeding, send the team a picture through [employer] with a description of the drainage (color/odor.amount) and whether you are having any other symptoms (fever/nausea/vomit). If you had a knee replacement, wrap your leg with an ace wrap.  If you have Steri-Strips (small white pieces of tape) across your incision, leave the strips in place until they fall off their own.   If you have staples or stiches outside your skin, they will be removed at your 2 week follow-up appointment.  You may see small clear stitch tails sticking out of the incision. Do not cut or pick them. Cover them with a bandage until they dissolve on their own.  Do not apply lotions/creams/ointments to the incision until the team tells you it is okay (typically at least 8 weeks after surgery). Once approved, you may use vitamin E on the skin.    Pain, Swelling, Bruising, Numbness  Pain  and swelling are normal for up to one year and can increase with excess activity or exercise. They can be reduced with rest, ice, compression stockings, leg elevation, and the prescribed pain medication regimen.  Wear the provided compression stockings during the day for one month after surgery, removing at night since your legs are elevated. Wear the thigh length stockings for the first 2 weeks and the calf length stocking for the next 2 weeks.  Bruising is normal for several weeks and will gradually subside.  Numbness or tingling in the skin surrounding the incision is common. Normal sensation may or may not return.  In general, it is common for the surgical area to not return completely to the way it was before surgery.    Clicking  Depending on the type of implant used in your joint, clicking with motion can be normal and is a result of the mechanical parts replacing the function of your original joint.    Driving and Travel  To help prevent blood clots, you should wait at least 6weeks before traveling long distances after surgery. If traveling by car, stop and walk around every hour. If traveling by plane, walk the aisle every hour and perform ankle pumps while seated.  When it is safe to return to driving after surgery is at your discretion. The primary concern is the ability to quickly and firmly press the brake pedal. At a minimum, you must be off all narcotic pain medications and able to walk with a cane.    Ice and Cold Therapy  All patients should use ice for at least the first 6 weeks after surgery to help with pain and swelling.  Do not place ice directly on skin. Always have a layer of protection (clothing, towel) between them.   If using ice/gel packs, alternate 20 minutes on/off.  If using an ice machine or cold therapy device, follow the usage and cleaning instructions provided with it. Typically, you do not need to take breaks like you do with ice/gel packs. You should detach the pad from the  cooler, check your skin under the pad, and ambulate at least once every hour. You may wear the cold therapy pad to sleep. Empty the cooler and pad when not using the device.     Emergencies  Reasons to call the office:  24 hours of fever above 101 F.  Excruciating pain not helped even when following the pain medication instructions.  Increased swelling not helped with rest, elevation, and ice.  New calf pain that is warm and tender to touch.  You have excessive bleeding from the incision that will not slow down. A small amount of drainage is normal and expected. When this occurs, apply pressure and cover the wound, only checking on it every 4-6 hours so that pressure stays applied.  Signs of an infection around the incision (excessive drainage soaking through dressing (especially if it is colored/malodorous), redness spreading out from the edges of your incision, or significantly increased warmth around the area (some warmth is normal)).  Reason to go directly to the emergency room or call 911:  You experience chest pain or difficulty breathing.    Travel Program  If you are part of the travel program ([employer], [place], [employer] employees), follow the same above instructions. The one difference is that you need to see your surgeon in clinic 1 week before traveling home. For questions, contact Zulma Helms RN ( [phone]).    Discharge Medication Instructions  Carefully follow instructions  Many problems that patients develop after discharge are a result of not taking the proper medications at the proper time. Please read and carefully follow the below instructions and pharmacy labels to maximize your successful recovery.  Medication refills  Only Ultram and oxycodone are eligible for refills. The government strictly controls these medications. In special circumstances, refills may be provided once every 7 days for up to 6 weeks after surgery.  Refill requests are only processed Monday through Friday before 1pm. If you  anticipate needing a refill, contact the office several days before to account for this schedule. If you leave a message, specify your preferred pharmacy location.  You will not receive a return call stating your prescription is refilled. Please contact your pharmacy to confirm it is available.  The other medications will only be prescribed once when you leave the hospital.  Side effects  Side effects are common, especially with tramadol and oxycodone. Please read the pharmacy packages carefully.    Discharge Medication List  Pain medication: Treat pain medications like building blocks. Always start by taking the medications at the bottom of the stack. If your pain is uncontrolled, continue taking those bottom medications and add a higher block. When your pain is controlled, take down the stack by removing the higher blocks first.   Bottom blocks (for mild pain)  Acetaminophen (Tylenol): If you have liver problems, please consult with your primary care physician regarding the dose.  Pregabalin (Lyrica), meloxicam (Mobic), and celecoxib (Celebrex): Do not be alarmed if you do not receive a prescription for these medications. They cannot be taken with certain medical conditions or ages, so your team may not have provided them.  Middle block: Tramadol (Ultram) (for moderate pain)  Your goal is to be completely off of this medication 1-2 weeks after surgery.  Do not take this medication at the same time as oxycodone.  Do not operate a motor vehicle while taking this medication.  Top block: Oxycodone (for severe pain)  Same instructions as tramadol.  Do not take this medication at the same time as tramadol.    Blood thinner  This medication is to prevent blood clots from forming in your legs or lungs. Take it as prescribed for the entire duration.  The specific medication prescribed to you depends on a variety of factors. Possibilities include a medication you were on before surgery, aspirin, Eliquis, Xarelto, Lovenox,  Warfarin, or some combination thereof.  Note: If you are prescribed aspirin, it is very important that you take it for the entire prescription duration. Do not stop taking it if your pain is under control. This prescription is to prevent blood clots, not to control pain.   Anti-constipation  Constipation is a common side effect of tramadol and oxycodone.  You have been provided prescriptions for stool softeners (docusate sodium (Colace) and polyethylene glycol (Miralax)). Take both of these medications while taking tramadol and oxycodone to help minimize constipation.  If you continue to have constipation despite taking these two medications, you may add over the counter bisacodyl (Dulcolax) suppositories.     Example Medication Schedules  Since you will be taking multiple medications, patients have found the below example schedules to be helpful when weaning off pain medication. They do not show your blood thinner since that will vary by patient.  Step 1: When you first return home and have the most pain  All the time: Ice/cold therapy  3am: Pain (Ultram)  6am: Pain (oxycodone), Constipation (Miralax)  8am: Pain (Tylenol)  9am: Pain (Ultram (and Mobic/Celebrex/Lyrica for some patients)), PPI (Protonix), Constipation (Colace)  Noon: Pain (oxycodone)  3pm: Pain (Ultram)  4pm: Pain (Tylenol)  6pm: Pain (oxycodone), Constipation (Miralax)  9pm: Pain (Ultram (and Celebrex/Lyrica for some patients)), constipation (Colace)  Midnight: Pain (Tylenol, oxycodone)  Step 2: As your pain begins to decrease, you can space out your pain medications  All the time: Ice/cold therapy  4am: Pain (oxycodone), Constipation (Colace)  8am: Pain (Tylenol, Ultram (and Mobic/Celebrex/Lyrica for some patients)), PPI (Protonix)  Noon: Pain (oxycodone), Constipation (Miralax)  4pm: Pain (Tylenol, Ultram), Constipation (Colace)  8pm: Pain (oxycodone (and Celebrex/Lyrica for some patients))  Midnight: Pain (Tylenol, Ultram)  Step 3: As your pain  continues to decrease, you can remove the top block of your pain pyramid (oxycodone)  All the time: Ice/cold therapy  4am: Pain (Tylenol), Constipation (Colace)  8am: Pain (Ultram (and Mobic/Celebrex/Lyrica for some patients)), PPI (Protonix)  Noon: Pain (Tylenol), Constipation (Miralax)  4pm: Pain (Ultram), Constipation (Colace)  8pm: Pain (Tylenol (and Celebrex/Lyrica for some patients))  Midnight: Pain (Ultram)  Step 4: As your pain continues to decrease, you can remove the middle block of your pain pyramid (Ultram) and the constipation medication  All the time: Ice/cold therapy  4am: Pain (Tylenol)  Noon: Pain (Tylenol (and Mobic/Celebrex/Lyrica for some patients)), PPI (Protonix)  8pm: Pain (Tylenol)  Midnight: Pain (Celebrex/Lyrica for some patients)

## 2024-03-12 NOTE — CONSULTS
Inpatient consult to Cardiology  Consult performed by: MARTA Lacy  Consult ordered by: MARTA Sahu  Reason for consult: a fib  Assessment/Recommendations: Permanent Atrial Fibrillation with accelerated rates felt due to both Surgical procedure, pain and medications being held.   Resume Oral Metoprolol and address pain as per Primary Service.   Overlap Anticoagulation.  Would suggest 1 mg / kg BID Dosing of Enoxaparin while resuming Warfarin and continue untilINR at Goal for 2-3 days before stopping Enoxaparin.  Follow up with Established cardiology care team at discharge.         History Of Present Illness:    José Barth is a 79 y.o. male with past medical history of permanent atrial fibrillation on Coumadin, hyperlipidemia, osteoarthritis  [place] [facility] [place] a [place] and found to have left hip fracture and  is post op day 1 for left hip repair.  Cardiology consulted for atrial fibrillation.     Patient states he is here after a fall in his driveway when he was getting the mail.  States he tripped over a curb.  States he has had atrial fibrillation for years.   No exacerbating or relieving factors.  Patient denies chest pain and angina.  Pt denies shortness of breath, dyspnea on exertion, orthopnea, and paroxysmal nocturnal dyspnea.  Pt denies worsening lower extremity edema.  Pt denies palpitations or syncope. No fever or chills.  No cough.  No change in bowel or bladder habits.  No sick contacts.  No recent travel.    Follows with Dr. Chen as outpatient    Medications at home atorvastatin 20 nightly metoprolol tartrate 25 twice daily warfarin 4 mg daily     Past Cardiology Tests (Last 3 Years):  EKG:  Results for orders placed during the hospital encounter of 03/08/24    Electrocardiogram, 12-lead PRN ACS symptoms    Narrative  Atrial fibrillation with rapid ventricular response with premature ventricular or aberrantly conducted complexes  Low voltage  "QRS  ST & T wave abnormality, consider anterior ischemia or digitalis effect  Abnormal ECG  When compared with ECG of 09-MAR-2024 18:38,  No significant change was found  Confirmed by Kenney Campbell ([id number]) on 3/12/2024 8:06:06 AM      ECG 12 lead (Preliminary)  This result has not been signed. Information might be incomplete.    Narrative  Atrial fibrillation  Abnormal ECG  When compared with ECG of 21-OCT-2022 11:11,  No significant change was found      Results for orders placed during the hospital encounter of 03/07/24    ECG 12 lead (Preliminary)  This result has not been signed. Information might be incomplete.    Narrative  Atrial fibrillation with rapid ventricular response  Nonspecific ST and T wave abnormality , probably digitalis effect  Abnormal ECG  When compared with ECG of 09-MAR-2024 18:39, (unconfirmed)  No significant change was found    Echo:  No results found for this or any previous visit.    Ejection Fractions:  No results found for: \"EF\"  Cath:  No results found for this or any previous visit.    Stress Test:  No results found for this or any previous visit.    Cardiac Imaging:  No results found for this or any previous visit.      Past Medical History:  He has a past medical history of Personal history of other diseases of the circulatory system and Personal history of other specified conditions.    Past Surgical History:  He has a past surgical history that includes Other surgical history (10/29/2021).      Social History:  He reports that he has never smoked. He has never used smokeless tobacco. No history on file for alcohol use and drug use.    Family History:  Family History   Problem Relation Name Age of Onset    Heart attack Father          Allergies:  Patient has no known allergies.    ROS:  10 point review of systems including (Constitutional, Eyes, ENMT, Respiratory, Cardiac, Gastrointestinal, Neurological, Psychiatric, and Hematologic) was performed and is otherwise " negative.    Objective Data:  Last Recorded Vitals:  Vitals:    24 0011 24 0314 24 0829 24 1143   BP: 107/62 107/66 99/65 108/66   BP Location: Left arm Left arm     Patient Position: Lying Lying     Pulse: 109 60 (!) 118 102   Resp: 18 16 18 18   Temp: 36.6 °C (97.9 °F) 36.6 °C (97.9 °F) (!) 31.7 °C (89.1 °F) 37.1 °C (98.8 °F)   TempSrc: Temporal Temporal Temporal Temporal   SpO2: 95% 92% 96% 94%   Weight:       Height:         Medical Gas Therapy: None (Room air)  O2 Delivery Method: Nasal cannula  Weight  Av.6 kg (160 lb)  Min: 72.6 kg (160 lb)  Max: 72.6 kg (160 lb)      LABS:  CMP:  Results from last 7 days   Lab Units 24  0656 24  0640 03/10/24  0653 24  0651 24  0753 24  1626   SODIUM mmol/L 134* 134* 136 136 137 139   POTASSIUM mmol/L 4.3 3.9 4.0 4.1 4.2 4.3   CHLORIDE mmol/L 103 103 106 105 105 107   CO2 mmol/L 23 24 23 24 24 23   ANION GAP mmol/L 12 11 11 11 12 13   BUN mg/dL 21 17 19 21 26* 24*   CREATININE mg/dL 0.86 0.77 0.85 0.93 0.95 1.07   EGFR mL/min/1.73m*2 88 >90 88 83 81 70   ALBUMIN g/dL  --   --   --   --  3.3*  --    ALT U/L  --   --   --   --  19  --    AST U/L  --   --   --   --  18  --    BILIRUBIN TOTAL mg/dL  --   --   --   --  2.0*  --      CBC:  Results from last 7 days   Lab Units 24  0656 24  2146 24  0640 03/10/24  0653 24  0651 24  0753 24  1927 24  1626   WBC AUTO x10*3/uL 10.5 8.3 7.7 7.7 8.0 8.0 10.2 10.8   HEMOGLOBIN g/dL 9.2* 8.2* 10.6* 10.9* 11.5* 12.0* 13.1* 13.5   HEMATOCRIT % 27.7* 24.3* 31.0* 31.5* 34.1* 35.4* 38.0* 40.1*   PLATELETS AUTO x10*3/uL 97* 89* 97* 74* 92* 107* 120* 129*   MCV fL 98 102* 100 98 101* 99 100 102*     COAG:   Results from last 7 days   Lab Units 24  0656 24  0640 03/10/24  0653 24  0651 24  1207 24  0753 24  1626   INR  1.6* 1.3* 1.2* 1.3* 2.7* 2.5* 2.4*     ABO:   ABO TYPE   Date Value Ref Range Status  "  03/11/2024 A  Final     HEME/ENDO:     CARDIAC:       No lab exists for component: \"CK\", \"CKMBP\"          Last I/O:    Intake/Output Summary (Last 24 hours) at 3/12/2024 1152  Last data filed at 3/12/2024 0829  Gross per 24 hour   Intake 2850 ml   Output 2610 ml   Net 240 ml     Net IO Since Admission: -1,135 mL [03/12/24 1152]      Imaging Results:  Electrocardiogram, 12-lead PRN ACS symptoms    Result Date: 3/12/2024  Atrial fibrillation with rapid ventricular response with premature ventricular or aberrantly conducted complexes Low voltage QRS ST & T wave abnormality, consider anterior ischemia or digitalis effect Abnormal ECG When compared with ECG of 09-MAR-2024 18:38, No significant change was found Confirmed by Kenney Campbell ([id number]) on 3/12/2024 8:06:06 AM    XR pelvis 1-2 views    Result Date: 3/8/2024  Interpreted By:  Fausto Alan, STUDY: XR PELVIS 1-2 VIEWS; ;  3/8/2024 3:24 pm   INDICATION: Signs/Symptoms:As low/inferior as possible while still including superior end of implant. Goal is to include as much of bilateral femurs (please ensure proper rotation of right femur) as possible for preoperative planning. Please Epic chat me with questions. Thank you..   COMPARISON: 04/26/2023 left hip radiograph   ACCESSION NUMBER(S): [id number]   ORDERING CLINICIAN: CHANTELL CARSON   FINDINGS: The proximal left femoral shaft has an acute oblique fracture extending from the greater trochanter inferiorly and medially exiting the medial cortex 3 cm superior to the distal end of the fitted femoral prosthetic stem.   The lateral fragment is distracted laterally and displaced proximally by about 1 cm with minimal lateral angulation.   No dislocation of the left prosthesis is noted.   No fracture or dislocation of the right hip is noted.       Acute fracture of the proximal left femoral shaft.     MACRO: None   Signed by: Fausto Alan 3/8/2024 3:27 PM Dictation workstation:   [id number]    ECG 12 lead    Result Date: " 3/8/2024  Atrial fibrillation Abnormal ECG When compared with ECG of 21-OCT-2022 11:11, No significant change was found    CT pelvis wo IV contrast    Result Date: 3/8/2024  Interpreted By:  Desiree Lewis, STUDY: CT PELVIS WO IV CONTRAST; ;  3/8/2024 1:24 am   INDICATION: Signs/Symptoms:orthopedic fractures.   COMPARISON: Correlation with CT left hip and CT femur 03/07/2024   ACCESSION NUMBER(S): [id number]   ORDERING CLINICIAN: TYLER AZMORANO   TECHNIQUE: Noncontrast CT images of the pelvis with axial, sagittal and coronal reconstructed images.   FINDINGS: Acute nondisplaced fractures of the left superior and inferior pubic rami are again noted, no significant change in alignment. There is also an acute nondisplaced fracture of the left sacral ala, not included on prior imaging. No sacroiliac or pubic symphysis diastasis.   Left total hip arthroplasty and the known left femur periprosthetic fracture are partially imaged. Mild to moderate right hip osteoarthrosis noted. Moderate to severe degenerative disc changes at L3-4, L4-5 and L5-S1.   Mild edema/hematoma adjacent to the left inferior pubic ramus. Visualized intrapelvic structures are unremarkable.       Acute nondisplaced left superior and inferior pubic rami fractures. Acute nondisplaced left sacral ala fracture. No sacroiliac or pubic symphysis diastasis.   Partially imaged known left periprosthetic femur fracture.     MACRO: None   Signed by: Desiree Lewis 3/8/2024 1:46 AM Dictation workstation:   [id number]      Inpatient Medications:  Scheduled medications   Medication Dose Route Frequency    [Held by provider] atorvastatin  20 mg oral Daily    ceFAZolin in dextrose (iso-os)  2 g intravenous q8h    enoxaparin  40 mg subcutaneous q24h    fluconazole  200 mg oral Daily    fluconazole  200 mg intravenous Once    [MAR Hold] metoprolol tartrate  25 mg oral BID    nystatin   Topical Once    vancomycin  1,500 mg intravenous q24h     PRN medications    Medication    benzocaine-menthol    [MAR Hold] ondansetron    oxyCODONE    oxyCODONE    vancomycin     Continuous Medications   Medication Dose Last Rate    sodium chloride 0.9%  50 mL/hr 50 mL/hr (03/10/24 0833)       Outpatient Medications:  Current Outpatient Medications   Medication Instructions    acetaminophen (TYLENOL) 325 mg, oral, Daily    atorvastatin (LIPITOR) 20 mg, oral, Daily    metoprolol tartrate (LOPRESSOR) 25 mg, oral, 2 times daily    warfarin (Coumadin) 4 mg tablet Take 1 tablet (4mg) by mouth once daily EXCEPT 0.5 tablet (2mg) by mouth on Wednesdays only or as directed per [facility]       Physical Exam:    General:  Patient is awake, alert, and oriented.  Patient is in no acute distress.  HEENT:  Pupils equal and reactive.  Normocephalic.  Moist mucosa.    Neck:  No thyromegaly.  Normal Jugular Venous Pressure.  Cardiovascular:  Regular rate and rhythm.  Normal S1 and S2.  Pulmonary:  Clear to auscultation bilaterally.  Abdomen:  Soft. Non-tender.   Non-distended.  Positive bowel sounds.  Lower Extremities:  2+ pedal pulses. No LE edema.  Neurologic:  Cranial nerves intact.  No focal deficit.   Skin: Skin warm and dry, normal skin turgor.   Psychiatric: Normal affect.     Assessment/Plan   José Barth is a 79 y.o. male with past medical history of permanent atrial fibrillation on Coumadin, hyperlipidemia, osteoarthritis  who [place] [facility] [place] with a fall and found to have left hip fracture and  is post op day 1 for left hip repair.  Cardiology consulted for atrial fibrillation.     3/12/2024 > tele showing a fib with rate 100    Assessment:  # Permanent atrial fibrillation  # POD 1 for hip surgery after mechanical fall    Plan:  - restart home metoprolol tartrate 25mg BID  - switch lovenox to warfarin when ok from ortho standpoint    Follow up with coumadin clinic  Follow up with DR. Chen      Code Status:  Full Code      Kacy Richards  KARAN-CNP    Both the JASMIN and I have had a face to face encounter with the patient today. I have examined the patient and edited the documented physical examination as necessary.  I personally reviewed the patient's recent labs, medications, orders, EKGs, and pertinent cardiac imaging.  I have reviewed the JASMIN's encounter note, approve the JASMIN's documentation and have edited the note to reflect the diagnostic and therapeutic plan.    José Barth is a 79 y.o. male with past medical history of permanent atrial fibrillation on Coumadin, hyperlipidemia, osteoarthritis  who [place] [facility] [place]24 with a fall and found to have left hip fracture and  is post op day 1 for left hip repair.  Cardiology consulted for atrial fibrillation.     Patient states he is here after a fall in his driveway when he was getting the mail.  States he tripped over a curb.  States he has had atrial fibrillation for years.   No exacerbating or relieving factors.  Patient denies chest pain and angina.  Pt denies shortness of breath, dyspnea on exertion, orthopnea, and paroxysmal nocturnal dyspnea.  Pt denies worsening lower extremity edema.  Pt denies palpitations or syncope. No fever or chills.  No cough.  No change in bowel or bladder habits.  No sick contacts.  No recent travel.    Follows with Dr. Chen as outpatient    Medications at home atorvastatin 20 nightly metoprolol tartrate 25 twice daily warfarin 4 mg daily     No exacerbating or relieving factors.  Patient denies chest pain and angina.  Pt denies shortness of breath, dyspnea on exertion, orthopnea, and paroxysmal nocturnal dyspnea.  Pt denies worsening lower extremity edema.  Pt denies palpitations or syncope.  No recent falls.  No fever or chills.  No cough.  No change in bowel or bladder habits.  No sick contacts.  No recent travel.     12 point review of systems including (Constitutional, Eyes, ENMT, Respiratory, Cardiac, Gastrointestinal, Neurological,  Psychiatric, and Hematologic) was performed and is otherwise negative.    Past medical history:  As above.    Medications were reviewed.    Allergies were reviewed.    Social history:  Patient denies smoking, alcohol abuse, or illicit drug use.    Family history:  No sudden cardiac death or premature coronary artery disease.     General:  Patient is awake, alert, and oriented.  Patient is in no acute distress. He is working with PT when seen   HEENT:  Pupils equal and reactive.  Normocephalic.  Moist mucosa.    Neck:  No thyromegaly.  Normal Jugular Venous Pressure.  Cardiovascular: Regular rate with normal rhythm with rare S1-S2  Pulmonary:  Clear to auscultation bilaterally.  Abdomen:  Soft. Non-tender.   Non-distended.  Positive bowel sounds.  Lower Extremities:  2+ pedal pulses. No LE edema.  Neurologic:  Cranial nerves intact.  No focal deficit.   Skin: Skin warm and dry, normal skin turgor.   Psychiatric: Normal affect.    Vital signs, telemetry, medications, labs, and imaging were reviewed as well.      Permanent Atrial Fibrillation with accelerated rates felt due to both Surgical procedure, pain and medications being held.   Resume Oral Metoprolol and address pain as per Primary Service.   Overlap Anticoagulation.  Would suggest 1 mg / kg BID Dosing of Enoxaparin while resuming Warfarin and continue untilINR at Goal for 2-3 days before stopping Enoxaparin.  Follow up with Established cardiology care team at discharge.       Dave Leach DO   Clinical Cardology   [facility]  [facility]

## 2024-03-12 NOTE — PROGRESS NOTES
PROGRESS NOTE - INTERNAL MEDICINE     PATIENT NAME:  José Barth    MRN:  [id number]  SERVICE DATE:  3/12/2024   SERVICE TIME:  2:37 PM     ADMITTING PHYSICIAN:  Kris Kuhn MD    ASSESSMENT:  Status post left hip arthroplasty for periprosthetic fracture.  History of hypertension  Chronic atrial fibrillation with variable rate, was on Coumadin on admission which has been transitioned to Lovenox prior to surgery and will resume Coumadin now.  Hyperlipidemia  Intertrigo in the inguinal area, advised to apply nystatin powder 3 times daily as needed.       PLAN  Patient was not seen on the day of surgery because he was in surgery and did not come back to the floor until after midnight.  Reviewed all labs and imaging studies,  Cardiology and infectious disease on the case  Continue current medication and anticoagulation.    SUBJECTIVE  CHIEF COMPLAINT:   Admitted after a fall sustaining a left periprosthetic hip fracture.    INTERVAL HISTORY OF PRESENT ILLNESS: Status post left hip periprosthetic fracture arthroplasty on March 11, 2024 on postoperatively coming along fine.        MEDICATIONS:   Current Facility-Administered Medications:     [Held by provider] atorvastatin (Lipitor) tablet 20 mg, 20 mg, oral, Daily, MARTA Sahu    benzocaine-menthol (Cepastat Sore Throat) 15-3.6 mg lozenge 1 lozenge, 1 lozenge, Mouth/Throat, q2h PRN, MARTA Sahu, 1 lozenge at 03/12/24 0340    ceFAZolin in dextrose (iso-os) (Ancef) IVPB 2 g, 2 g, intravenous, q8h, MARTA Sahu, Stopped at 03/12/24 1046    [START ON 3/13/2024] enoxaparin (Lovenox) syringe 105 mg, 1.5 mg/kg, subcutaneous, q24h, MARTA Sahu    fluconazole (Diflucan) tablet 200 mg, 200 mg, oral, Daily, MARTA Ashby, 200 mg at 03/12/24 1020    fluconazole in NaCl (iso-osm) (Diflucan) IVPB 200 mg, 200 mg, intravenous, Once, MARTA Sahu    metoprolol tartrate (Lopressor) tablet 25 mg,  "25 mg, oral, BID, Kacy Richards, KARAN-CNP, 25 mg at 03/12/24 1420    nystatin (Mycostatin) cream, , Topical, Once, MARTA Sahu    ondansetron (Zofran) injection 4 mg, 4 mg, intravenous, q8h PRN, MARTA Sahu    oxyCODONE (Roxicodone) immediate release tablet 10 mg, 10 mg, oral, q6h PRN, MARTA Sahu, 10 mg at 03/12/24 0223    oxyCODONE (Roxicodone) immediate release tablet 5 mg, 5 mg, oral, q6h PRN, MARTA Sahu, 5 mg at 03/12/24 1422    sodium chloride 0.9% infusion, 50 mL/hr, intravenous, Continuous, MARTA Sahu, Last Rate: 50 mL/hr at 03/12/24 1420, 50 mL/hr at 03/12/24 1420    vancomycin (Vancocin) in dextrose 5 % water (D5W) 500 mL IV 1,500 mg, 1,500 mg, intravenous, q24h, MARTA Sahu    vancomycin (Vancocin) placeholder, , miscellaneous, Daily PRN, MARTA Sahu        OBJECTIVE  Visit Vitals  /66   Pulse 102   Temp 37.1 °C (98.8 °F) (Temporal)   Resp 18   Ht 1.753 m (5' 9.02\")   Wt 72.6 kg (160 lb)   SpO2 94%   BMI 23.62 kg/m²   Smoking Status Never   BSA 1.88 m²      PHYSICAL EXAM:   GENERAL:  Alert, no moderate distress, cooperative  SKIN:  Skin color, texture, turgor normal. No rashes or lesions.  OROPHARYNX:  Lips, mucosa, and tongue are normal.Teeth and gums, normal. Oropharynx normal.  NECK:  No jugulovenous distention, No carotid bruits, Carotid pulse normal contour, Supple  LUNGS:  Lungs clear to auscultation. Good diaphragmatic excursion.  CARDIAC: Remains in atrial fibrillation with variable ventricular rate ,normal S1 and S2; no rubs,  or gallops, 2/6 systolic ejection murmur left sternal border, no CHF.  ABDOMEN:  Abdomen soft, non-tender, BS normal, No masses or organomegaly  Musculoskeletal, status post left hip arthroplasty for periprosthetic fracture on March 11, 2024.  EXTREMETIES:  Extremities normal, no deformities, edema, clubbing or skin discoloration. Good capillary refill., No ulcers  NEURO:  " Alert, oriented X 3, Gait not examined Non-focal. Reflexes normal and symmetric. Sensation grossly intact., Cranial nerves II-XII intact  PULSES:  2+ radial, 2+ carotid      DATA:   Diagnostic tests reviewed for today's visit:    Results for orders placed or performed during the hospital encounter of 03/08/24 (from the past 96 hour(s))   CBC   Result Value Ref Range    WBC 8.0 4.4 - 11.3 x10*3/uL    nRBC 0.0 0.0 - 0.0 /100 WBCs    RBC 3.39 (L) 4.50 - 5.90 x10*6/uL    Hemoglobin 11.5 (L) 13.5 - 17.5 g/dL    Hematocrit 34.1 (L) 41.0 - 52.0 %     (H) 80 - 100 fL    MCH 33.9 26.0 - 34.0 pg    MCHC 33.7 32.0 - 36.0 g/dL    RDW 12.9 11.5 - 14.5 %    Platelets 92 (L) 150 - 450 x10*3/uL   Basic Metabolic Panel   Result Value Ref Range    Glucose 95 74 - 99 mg/dL    Sodium 136 136 - 145 mmol/L    Potassium 4.1 3.5 - 5.3 mmol/L    Chloride 105 98 - 107 mmol/L    Bicarbonate 24 21 - 32 mmol/L    Anion Gap 11 10 - 20 mmol/L    Urea Nitrogen 21 6 - 23 mg/dL    Creatinine 0.93 0.50 - 1.30 mg/dL    eGFR 83 >60 mL/min/1.73m*2    Calcium 7.5 (L) 8.6 - 10.3 mg/dL   Protime-INR   Result Value Ref Range    Protime 14.8 (H) 9.8 - 12.8 seconds    INR 1.3 (H) 0.9 - 1.1   Sedimentation rate, automated   Result Value Ref Range    Sedimentation Rate 6 0 - 20 mm/h   C-reactive protein   Result Value Ref Range    C-Reactive Protein 13.09 (H) <1.00 mg/dL   Electrocardiogram, 12-lead PRN ACS symptoms   Result Value Ref Range    Ventricular Rate 138 BPM    Atrial Rate 138 BPM    QRS Duration 78 ms    QT Interval 272 ms    QTC Calculation(Bazett) 412 ms    R Axis -24 degrees    T Axis -64 degrees    QRS Count 23 beats    Q Onset 213 ms    T Offset 349 ms    QTC Fredericia 359 ms   CBC   Result Value Ref Range    WBC 7.7 4.4 - 11.3 x10*3/uL    nRBC 0.0 0.0 - 0.0 /100 WBCs    RBC 3.20 (L) 4.50 - 5.90 x10*6/uL    Hemoglobin 10.9 (L) 13.5 - 17.5 g/dL    Hematocrit 31.5 (L) 41.0 - 52.0 %    MCV 98 80 - 100 fL    MCH 34.1 (H) 26.0 - 34.0 pg     MCHC 34.6 32.0 - 36.0 g/dL    RDW 12.3 11.5 - 14.5 %    Platelets 74 (L) 150 - 450 x10*3/uL   Basic Metabolic Panel   Result Value Ref Range    Glucose 105 (H) 74 - 99 mg/dL    Sodium 136 136 - 145 mmol/L    Potassium 4.0 3.5 - 5.3 mmol/L    Chloride 106 98 - 107 mmol/L    Bicarbonate 23 21 - 32 mmol/L    Anion Gap 11 10 - 20 mmol/L    Urea Nitrogen 19 6 - 23 mg/dL    Creatinine 0.85 0.50 - 1.30 mg/dL    eGFR 88 >60 mL/min/1.73m*2    Calcium 7.1 (L) 8.6 - 10.3 mg/dL   Protime-INR   Result Value Ref Range    Protime 13.9 (H) 9.8 - 12.8 seconds    INR 1.2 (H) 0.9 - 1.1   CBC   Result Value Ref Range    WBC 7.7 4.4 - 11.3 x10*3/uL    nRBC 0.0 0.0 - 0.0 /100 WBCs    RBC 3.11 (L) 4.50 - 5.90 x10*6/uL    Hemoglobin 10.6 (L) 13.5 - 17.5 g/dL    Hematocrit 31.0 (L) 41.0 - 52.0 %     80 - 100 fL    MCH 34.1 (H) 26.0 - 34.0 pg    MCHC 34.2 32.0 - 36.0 g/dL    RDW 12.3 11.5 - 14.5 %    Platelets 97 (L) 150 - 450 x10*3/uL   Basic Metabolic Panel   Result Value Ref Range    Glucose 98 74 - 99 mg/dL    Sodium 134 (L) 136 - 145 mmol/L    Potassium 3.9 3.5 - 5.3 mmol/L    Chloride 103 98 - 107 mmol/L    Bicarbonate 24 21 - 32 mmol/L    Anion Gap 11 10 - 20 mmol/L    Urea Nitrogen 17 6 - 23 mg/dL    Creatinine 0.77 0.50 - 1.30 mg/dL    eGFR >90 >60 mL/min/1.73m*2    Calcium 7.1 (L) 8.6 - 10.3 mg/dL   Protime-INR   Result Value Ref Range    Protime 14.9 (H) 9.8 - 12.8 seconds    INR 1.3 (H) 0.9 - 1.1   VERIFY ABO/Rh Group Test   Result Value Ref Range    ABO TYPE A     Rh TYPE POS    Prepare RBC: 2 Units   Result Value Ref Range    PRODUCT CODE Z7076Y61     Unit Number [id number]     Unit ABO A     Unit RH POS     XM INTEP COMP     Dispense Status TR     Blood Expiration Date April 05, 2024 23:59 EDT     PRODUCT BLOOD TYPE 6200     UNIT VOLUME 350     PRODUCT CODE H8173S03     Unit Number [id number]     Unit ABO A     Unit RH POS     XM INTEP COMP     Dispense Status XM     Blood Expiration Date April 05, 2024 23:59  EDT     PRODUCT BLOOD TYPE 6200     UNIT VOLUME 350    Type and screen   Result Value Ref Range    ABO TYPE A     Rh TYPE POS     ANTIBODY SCREEN NEG    Fungal Culture/Smear    Specimen: ABSCESS; Swab   Result Value Ref Range    Fungal Culture/Smear       Culture in progress, a report will be issued when positive or after 2 weeks of incubation.    Fungal Smear No fungal elements seen    Tissue/Wound Culture/Smear    Specimen: ABSCESS; Swab   Result Value Ref Range    Tissue/Wound Culture/Smear No growth to date     Gram Stain (4+) Abundant Polymorphonuclear leukocytes (A)     Gram Stain (4+) Abundant Mixed Gram positive bacteria (A)    CBC   Result Value Ref Range    WBC 8.3 4.4 - 11.3 x10*3/uL    nRBC 0.0 0.0 - 0.0 /100 WBCs    RBC 2.39 (L) 4.50 - 5.90 x10*6/uL    Hemoglobin 8.2 (L) 13.5 - 17.5 g/dL    Hematocrit 24.3 (L) 41.0 - 52.0 %     (H) 80 - 100 fL    MCH 34.3 (H) 26.0 - 34.0 pg    MCHC 33.7 32.0 - 36.0 g/dL    RDW 12.4 11.5 - 14.5 %    Platelets 89 (L) 150 - 450 x10*3/uL   CBC   Result Value Ref Range    WBC 10.5 4.4 - 11.3 x10*3/uL    nRBC 0.0 0.0 - 0.0 /100 WBCs    RBC 2.83 (L) 4.50 - 5.90 x10*6/uL    Hemoglobin 9.2 (L) 13.5 - 17.5 g/dL    Hematocrit 27.7 (L) 41.0 - 52.0 %    MCV 98 80 - 100 fL    MCH 32.5 26.0 - 34.0 pg    MCHC 33.2 32.0 - 36.0 g/dL    RDW 14.2 11.5 - 14.5 %    Platelets 97 (L) 150 - 450 x10*3/uL   Basic Metabolic Panel   Result Value Ref Range    Glucose 201 (H) 74 - 99 mg/dL    Sodium 134 (L) 136 - 145 mmol/L    Potassium 4.3 3.5 - 5.3 mmol/L    Chloride 103 98 - 107 mmol/L    Bicarbonate 23 21 - 32 mmol/L    Anion Gap 12 10 - 20 mmol/L    Urea Nitrogen 21 6 - 23 mg/dL    Creatinine 0.86 0.50 - 1.30 mg/dL    eGFR 88 >60 mL/min/1.73m*2    Calcium 7.0 (L) 8.6 - 10.3 mg/dL   Protime-INR   Result Value Ref Range    Protime 18.2 (H) 9.8 - 12.8 seconds    INR 1.6 (H) 0.9 - 1.1        XR hip left with pelvis when performed 2 or 3 views    Result Date: 3/12/2024  Interpreted By:  Dandy  Ryan, STUDY: XR HIP LEFT WITH PELVIS WHEN PERFORMED 2 OR 3 VIEWS; XR FEMUR LEFT 2+ VIEWS;  3/11/2024 10:29 pm   INDICATION: Signs/Symptoms:Postop; Signs/Symptoms:postop.   COMPARISON: Previous exam from 03/08/2024.  .   ACCESSION NUMBER(S): [id number]; [id number]   ORDERING CLINICIAN: CHANTELL CARSON   TECHNIQUE: AP view pelvis, two views left hip, and four views left femur were obtained.   FINDINGS: Patient had a previous total left hip arthroplasty with subsequent acute proximal femoral fracture. Patient is now status post revision placement of a new long femoral component with multiple proximal femoral cerclage wires. There is anatomic alignment of the fracture fragments. Femoral and acetabular components are well seated and in good alignment. There is mild postsurgical air in the soft tissues and there is a surgical drain in place. There is no additional femoral fracture. No destructive bone lesion.       Interval revision of previous left hip arthroplasty with periprosthetic fracture. There is a new long-stem femoral component in place with multiple proximal femoral cerclage wires and anatomic alignment of the previous fracture fragments.   MACRO: None   Signed by: Ryan Dorman 3/12/2024 8:13 AM Dictation workstation:   [id number]    XR femur left 2+ views    Result Date: 3/12/2024  Interpreted By:  Ryan Dorman, STUDY: XR HIP LEFT WITH PELVIS WHEN PERFORMED 2 OR 3 VIEWS; XR FEMUR LEFT 2+ VIEWS;  3/11/2024 10:29 pm   INDICATION: Signs/Symptoms:Postop; Signs/Symptoms:postop.   COMPARISON: Previous exam from 03/08/2024.  .   ACCESSION NUMBER(S): [id number]; [id number]   ORDERING CLINICIAN: CHANTELL CARSON   TECHNIQUE: AP view pelvis, two views left hip, and four views left femur were obtained.   FINDINGS: Patient had a previous total left hip arthroplasty with subsequent acute proximal femoral fracture. Patient is now status post revision placement of a new long femoral component with multiple proximal  femoral cerclage wires. There is anatomic alignment of the fracture fragments. Femoral and acetabular components are well seated and in good alignment. There is mild postsurgical air in the soft tissues and there is a surgical drain in place. There is no additional femoral fracture. No destructive bone lesion.       Interval revision of previous left hip arthroplasty with periprosthetic fracture. There is a new long-stem femoral component in place with multiple proximal femoral cerclage wires and anatomic alignment of the previous fracture fragments.   MACRO: None   Signed by: Ryan Dorman 3/12/2024 8:13 AM Dictation workstation:   [id number]    Electrocardiogram, 12-lead PRN ACS symptoms    Result Date: 3/12/2024  Atrial fibrillation with rapid ventricular response with premature ventricular or aberrantly conducted complexes Low voltage QRS ST & T wave abnormality, consider anterior ischemia or digitalis effect Abnormal ECG When compared with ECG of 09-MAR-2024 18:38, No significant change was found Confirmed by Kenney Campbell ([id number]) on 3/12/2024 8:06:06 AM    [place] less than 1 hour    Result Date: 3/11/2024  These images are not reportable by radiology and will not be interpreted by  Radiologists.    ECG 12 lead    Result Date: 3/11/2024  Atrial fibrillation with rapid ventricular response Nonspecific ST and T wave abnormality , probably digitalis effect Abnormal ECG When compared with ECG of 09-MAR-2024 18:39, (unconfirmed) No significant change was found    XR pelvis 1-2 views    Result Date: 3/8/2024  Interpreted By:  Fausto Alan, STUDY: XR PELVIS 1-2 VIEWS; ;  3/8/2024 3:24 pm   INDICATION: Signs/Symptoms:As low/inferior as possible while still including superior end of implant. Goal is to include as much of bilateral femurs (please ensure proper rotation of right femur) as possible for preoperative planning. Please Epic chat me with questions. Thank you..   COMPARISON: 04/26/2023 left hip radiograph    ACCESSION NUMBER(S): [id number]   ORDERING CLINICIAN: CHANTELL CARSON   FINDINGS: The proximal left femoral shaft has an acute oblique fracture extending from the greater trochanter inferiorly and medially exiting the medial cortex 3 cm superior to the distal end of the fitted femoral prosthetic stem.   The lateral fragment is distracted laterally and displaced proximally by about 1 cm with minimal lateral angulation.   No dislocation of the left prosthesis is noted.   No fracture or dislocation of the right hip is noted.       Acute fracture of the proximal left femoral shaft.     MACRO: None   Signed by: Fausto Alan 3/8/2024 3:27 PM Dictation workstation:   [id number]    ECG 12 lead    Result Date: 3/8/2024  Atrial fibrillation Abnormal ECG When compared with ECG of 21-OCT-2022 11:11, No significant change was found    CT pelvis wo IV contrast    Result Date: 3/8/2024  Interpreted By:  Desiree Lewis, STUDY: CT PELVIS WO IV CONTRAST; ;  3/8/2024 1:24 am   INDICATION: Signs/Symptoms:orthopedic fractures.   COMPARISON: Correlation with CT left hip and CT femur 03/07/2024   ACCESSION NUMBER(S): [id number]   ORDERING CLINICIAN: TYLER ZAMORANO   TECHNIQUE: Noncontrast CT images of the pelvis with axial, sagittal and coronal reconstructed images.   FINDINGS: Acute nondisplaced fractures of the left superior and inferior pubic rami are again noted, no significant change in alignment. There is also an acute nondisplaced fracture of the left sacral ala, not included on prior imaging. No sacroiliac or pubic symphysis diastasis.   Left total hip arthroplasty and the known left femur periprosthetic fracture are partially imaged. Mild to moderate right hip osteoarthrosis noted. Moderate to severe degenerative disc changes at L3-4, L4-5 and L5-S1.   Mild edema/hematoma adjacent to the left inferior pubic ramus. Visualized intrapelvic structures are unremarkable.       Acute nondisplaced left superior and inferior pubic rami  fractures. Acute nondisplaced left sacral ala fracture. No sacroiliac or pubic symphysis diastasis.   Partially imaged known left periprosthetic femur fracture.     MACRO: None   Signed by: Desiree Lewis 3/8/2024 1:46 AM Dictation workstation:   [id number]    CT hip left wo IV contrast    Result Date: 3/7/2024  Interpreted By:  Desiree Lewis, STUDY: CT HIP LEFT WO IV CONTRAST; CT FEMUR LEFT WO IV CONTRAST; ;  3/7/2024 5:55 pm   INDICATION: Signs/Symptoms:eval L femur fracture; Signs/Symptoms:eval femur fracture.   COMPARISON: Radiographs of the same day   ACCESSION NUMBER(S): [id number]; [id number]   ORDERING CLINICIAN: BELEN PEREZ   TECHNIQUE: Noncontrast CT images of the left hip and left femur with axial, sagittal and coronal reconstructed images. Metal artifact reduction technique was used.   FINDINGS: Left total hip arthroplasty is again noted. Acute periprosthetic femur fracture is again noted. The proximal extent of the fracture is just distal to the greater trochanter, the fracture line extends to the medial femoral diaphysis, 2-3 cm proximal to the femoral stem tip. There is mild comminution, with a not significantly displaced posterior butterfly fragment. There is 2 cm medial to lateral and anterior to posterior displacement of the dominant fracture fragments. The knee is externally rotated.   There are acute appearing nondisplaced left superior and inferior pubic rami fractures.   Subchondral cyst noted in the left acetabulum.   Mild tricompartmental osteoarthrosis of the knee.   There is edema/hematoma in the proximal to mid anterior compartment of the thigh. Mild hematoma around the left inferior pubic ramus. Visualized intrapelvic structures are unremarkable.       Acute, displaced left periprosthetic proximal femur fracture.   Acute, nondisplaced left pubic rami fractures.   Edema/hematoma in the anterior compartment of the thigh and adjacent to the inferior pubic ramus.     MACRO: None    Signed by: Desiree Lewis 3/7/2024 7:06 PM Dictation workstation:   [id number]    CT femur left wo IV contrast    Result Date: 3/7/2024  Interpreted By:  Desiree Lewis, STUDY: CT HIP LEFT WO IV CONTRAST; CT FEMUR LEFT WO IV CONTRAST; ;  3/7/2024 5:55 pm   INDICATION: Signs/Symptoms:eval L femur fracture; Signs/Symptoms:eval femur fracture.   COMPARISON: Radiographs of the same day   ACCESSION NUMBER(S): [id number]; [id number]   ORDERING CLINICIAN: BELEN PEREZ   TECHNIQUE: Noncontrast CT images of the left hip and left femur with axial, sagittal and coronal reconstructed images. Metal artifact reduction technique was used.   FINDINGS: Left total hip arthroplasty is again noted. Acute periprosthetic femur fracture is again noted. The proximal extent of the fracture is just distal to the greater trochanter, the fracture line extends to the medial femoral diaphysis, 2-3 cm proximal to the femoral stem tip. There is mild comminution, with a not significantly displaced posterior butterfly fragment. There is 2 cm medial to lateral and anterior to posterior displacement of the dominant fracture fragments. The knee is externally rotated.   There are acute appearing nondisplaced left superior and inferior pubic rami fractures.   Subchondral cyst noted in the left acetabulum.   Mild tricompartmental osteoarthrosis of the knee.   There is edema/hematoma in the proximal to mid anterior compartment of the thigh. Mild hematoma around the left inferior pubic ramus. Visualized intrapelvic structures are unremarkable.       Acute, displaced left periprosthetic proximal femur fracture.   Acute, nondisplaced left pubic rami fractures.   Edema/hematoma in the anterior compartment of the thigh and adjacent to the inferior pubic ramus.     MACRO: None   Signed by: Desiree Lewis 3/7/2024 7:06 PM Dictation workstation:   [id number]    XR shoulder left 2+ views    Result Date: 3/7/2024  Interpreted By:  Dave Hatfield, STUDY: XR  SHOULDER LEFT 2+ VIEWS; ;  3/7/2024 2:11 pm   INDICATION: Signs/Symptoms:trauma.   COMPARISON: None.   ACCESSION NUMBER(S): [id number]   ORDERING CLINICIAN: PREETHI RICHARD   FINDINGS: No acute fracture or glenohumeral dislocation.No acromioclavicular seperation.Mild acromioclavicular degenerative changes.       No acute osseous abnormality left shoulder.     MACRO: None   Signed by: Dave Hatfield 3/7/2024 2:22 PM Dictation workstation:   [id number]    XR femur left 2+ views    Result Date: 3/7/2024  Interpreted By:  Dave Hatfield, STUDY: XR PELVIS 1-2 VIEWS; XR FEMUR LEFT 2+ VIEWS; ;  3/7/2024 2:11 pm   INDICATION: Signs/Symptoms:trauma.   COMPARISON: 04/26/2023   ACCESSION NUMBER(S): [id number]; [id number]   ORDERING CLINICIAN: BELEN RICHARD   FINDINGS: Frontal view the pelvis and four views of the left femur. There is an obliquely oriented, displaced periprosthetic fracture traversing region of patient's femoral stem component of left hip arthroplasty. No dislocation. Imaged distal femur appears intact.       Acute, obliquely oriented periprosthetic fracture of the left proximal femur across region of femoral stem component of left hip arthroplasty.     MACRO: None   Signed by: Dave Hatfield 3/7/2024 2:21 PM Dictation workstation:   [id number]    XR pelvis 1-2 views    Result Date: 3/7/2024  Interpreted By:  Dave Hatfield, STUDY: XR PELVIS 1-2 VIEWS; XR FEMUR LEFT 2+ VIEWS; ;  3/7/2024 2:11 pm   INDICATION: Signs/Symptoms:trauma.   COMPARISON: 04/26/2023   ACCESSION NUMBER(S): [id number]; [id number]   ORDERING CLINICIAN: BELEN RICHARD   FINDINGS: Frontal view the pelvis and four views of the left femur. There is an obliquely oriented, displaced periprosthetic fracture traversing region of patient's femoral stem component of left hip arthroplasty. No dislocation. Imaged distal femur appears intact.       Acute, obliquely oriented periprosthetic fracture of the left  proximal femur across region of femoral stem component of left hip arthroplasty.     MACRO: None   Signed by: Dave Hatfield 3/7/2024 2:21 PM Dictation workstation:   [id number]                SIGNATURE:  Kris Kuhn MD  DATE:  March 12, 2024  TIME:  2:37 PM

## 2024-03-12 NOTE — CARE PLAN
The patient's goals for the shift include      The clinical goals for the shift include safety    Over the shift, the patient did not make progress toward the following goals. Barriers to progression include   Problem: Pain  Goal: My pain/discomfort is manageable  Outcome: Progressing     Problem: Safety  Goal: Patient will be injury free during hospitalization  Outcome: Progressing  Goal: I will remain free of falls  Outcome: Progressing     Problem: Daily Care  Goal: Daily care needs are met  Outcome: Progressing     Problem: Psychosocial Needs  Goal: Demonstrates ability to cope with hospitalization/illness  Outcome: Progressing  Goal: Collaborate with me, my family, and caregiver to identify my specific goals  Outcome: Progressing     Problem: Psychosocial Needs  Goal: Demonstrates ability to cope with hospitalization/illness  Outcome: Progressing  Goal: Collaborate with me, my family, and caregiver to identify my specific goals  Outcome: Progressing     Problem: Discharge Barriers  Goal: My discharge needs are met  Outcome: Progressing     Problem: Skin  Goal: Decreased wound size/increased tissue granulation at next dressing change  Outcome: Progressing  Goal: Prevent/manage excess moisture  Outcome: Progressing  Goal: Promote/optimize nutrition  Outcome: Progressing   . Recommendations to address these barriers include .

## 2024-03-12 NOTE — PROGRESS NOTES
S: NAEON. Comfortable and pleased with care.          O  VS:  Temp:  [31.7 °C (89.1 °F)-37.1 °C (98.8 °F)] 36.9 °C (98.4 °F)  Heart Rate:  [] 114  Resp:  [15-18] 18  BP: ()/(56-87) 107/65    Gen: NAD  Focused exam of LLE  -Dressing with minor stains  -SILT in S/S/SP/DP/T/F distributions  -Able to flex Q, TA, GS  -Palpable DP/PT pulse (only 1+, but symmetric to RLE), BCR  -SCDs in place  Groin  -Xeroform, 4x4s, foam tape covering areas of excoriation.    Drain  HV x1 (150cc since OR)  Micro  OR tissue cx x5: NGTD  OR aspiration cx x1: Pending  OR aspiration cell count/diff x1: Pending  OR groin swab x1 (pending): GS abundant PMN, mixed GP bacteria. NGTD.  PT/OT  Began today.  Labs (pertinent)  Hgb 9.2 (s/p 1u pRBC last night)  INR 1.6  Platelets 97  Imaging  No new imaging            A&P: José Barth is a 79 y.o. male presenting with:  Left nondisplaced LC1 fracture  Discussed this pattern is typically treated with progressive WBAT with a walker. Pt agreed with this plan.  Groin excoriation  ID consult: Candidiasis. Rec nystatin powder. Cx not relevant (reflects local liudmila). No need for abx. Signed off.  Wound care consult: Appreciate care.  L GITA periprosthetic femur fracture  Cardiology consult re afib: Restart home metaprolol. Switch LVX to home warfarin when ok from ortho standpoint.   IM primary  Anticoag: Early mobilization. Ok to resume warfarin from my perspective.  Diet: Routine. DC IVF when adequate PO intake  Surgical care  Abx: Revision abx (EOAP). Will continue ancef until hip cx negative.  HV: Monitor output.  Dressing: Aquacel/Mepilex for 7d. Patient will remove at home then apply daily dry dressing PRN  Activity: NWB LLE. Work with PT with walker. Posterior precautions. No active abduction.  Swelling: Compression hose PRN  Dispo  Given injury, please ensure patient follows up with PCP and/or endocrinology for bone density assessment and possible treatment to prevent future  injuries.  DC: Pending PT/OT. F/u 2wks after DC.

## 2024-03-12 NOTE — CONSULTS
"Vancomycin Dosing by Pharmacy- INITIAL    José Barth is a 79 y.o. year old male who Pharmacy has been consulted for vancomycin dosing for other surgical prophylaxis . Based on the patient's indication and renal status this patient will be dosed based on a goal AUC of 400-600.     Renal function is currently stable.    Visit Vitals  BP 87/66 (BP Location: Right arm, Patient Position: Lying)   Pulse (!) 118   Temp 36 °C (96.8 °F) (Temporal)   Resp 15        Lab Results   Component Value Date    CREATININE 0.77 03/11/2024    CREATININE 0.85 03/10/2024    CREATININE 0.93 03/09/2024    CREATININE 0.95 03/08/2024        Patient weight is No results found for: \"PTWEIGHT\"    No results found for: \"CULTURE\"     I/O last 3 completed shifts:  In: 1000 (13.8 mL/kg) [I.V.:1000 (13.8 mL/kg)]  Out: 2450 (33.8 mL/kg) [Urine:2050 (0.8 mL/kg/hr); Blood:400]  Weight: 72.6 kg   [unfilled]    No results found for: \"PATIENTTEMP\"       Assessment/Plan     Patient has already been given a loading dose of 2x 1000 mg in surgery.  Will initiate vancomycin maintenance,  1500 mg every 24 hours.    This dosing regimen is predicted by [employer] to result in the following pharmacokinetic parameters:  Regimen: 1500 mg IV every 24 hours.  Start time: 14:47 on 03/12/2024  Exposure target: AUC24 (range)400-600 mg/L.hr   AUC24,ss: 456 mg/L.hr  Probability of AUC24 > 400: 65 %  Ctrough,ss: 12.2 mg/L  Probability of Ctrough,ss > 20: 14 %  Probability of nephrotoxicity (Lodise SOPHIE 2009): 7 %    Follow-up level will be ordered on 3/13 at am lab unless clinically indicated sooner.  Will continue to monitor renal function daily while on vancomycin and order serum creatinine at least every 48 hours if not already ordered.  Follow for continued vancomycin needs, clinical response, and signs/symptoms of toxicity.       Ronn Casey, PharmD       "

## 2024-03-12 NOTE — PROGRESS NOTES
Vancomycin Dosing by Pharmacy- Cessation of Therapy    Consult to pharmacy for vancomycin dosing has been discontinued by the prescriber, pharmacy will sign off at this time.    Please call pharmacy if there are further questions or re-enter a consult if vancomycin is resumed.     Isabel Carr, PharmD

## 2024-03-12 NOTE — PERIOPERATIVE NURSING NOTE
2115 Pt arrived to pacu bay at this time, report received from OR and anesthesia staff. Phase 1 care started.   2148 CBC sent to lab, unable to print correct sticker. Spoke with lab who said to send pt specimen with label. Xray bedside.   2200 xray remains bedside. Dr. Ruffin stating he updated family of pt status upon completion of procedure.   2215 Dr. Mcdowell bedside, aware of BP reading. Stating to admin 1 unit blood.  2223 Dr. Ruffin, doppling pulses harsh pedal pulses. Pulses present.  2230 pt resting, awaiting to be transfused.   2257 blood transfusion started per order, dual sign off with 2nd RN.   2300 pt family updated via phone call, aware of plan for patient to return to room. Pt resting quietly, reporting pain but tolerable. Will continue to monitor patient during transfusion.   2315 transfusion rate changed at this time. Pt resting quietly, no signs/reports of transfusion reaction.   2330 pt resting quietly, no needs. No signs/symptoms of transfusion reaction.  2331 report to [place] RN via [place].   2345 pt resting quietly, pain remains tolerable.  2350 blood transfusion completed.   2355Pt taken up to room by this RN at this time in stable condition. Report previously sent to receiving RN. All belongings taken with pt to room, family updated on pt status.

## 2024-03-12 NOTE — PROGRESS NOTES
S: SHANNON since OR. Comfortable in PACU. Wife and sister-in-law updated by me personally, pleased with care.          O  VS:  Temp:  [36.5 °C (97.7 °F)-37.4 °C (99.3 °F)] 37.4 °C (99.3 °F)  Heart Rate:  [] 120  Resp:  [15-18] 18  BP: ()/(55-66) 108/62    Gen: NAD  Focused exam of LLE  -Dressing with minor stains  -SILT in S/S/SP/DP/T/F distributions  -Able to flex Q, TA, GS  -Palpable DP/PT pulse (only 1+, but symmetric to RLE), BCR  -SCDs in place        Drain  HV x1  Micro  OR tissue cx x5 (pending)  OR aspiration cx x1 (pending)  OR aspiration cell count/diff x1 (pending)  OR groin swab x1 (pending)  PT/OT  Pending  Labs (pertinent)  Hgb pending  INR in AM  Platelets pending  Imaging  3/11/2024 L hip (AP Pelvis, AP/Lateral), L femur (AP/lat) on my read: Revision GITA components in acceptable position with no sign of gross implant/hardware failure.            A&P: José Barth is a 79 y.o. male presenting with:  Left nondisplaced LC1 fracture  Discussed this pattern is typically treated with progressive WBAT with a walker. Pt agreed with this plan.  L GITA periprosthetic femur fracture  ID consult: Final plan pending their input.  Pending cx/labs per above.   Hip: Revision abx (IV ancef/vanc + EOAP)  Groin: Fluconazole 200mg daily  Wound care consult: Appreciate groin care for excoriated area.  IM primary  Anticoag: Early mobilization. Resumption of LVX then warfarin pending H&H. SCDs.(Baseline afib on coumadin)  Diet: Routine. DC IVF when adequate PO intake  Surgical care  HV: Monitor daily output.  Dressing: Aquacel/Mepilex for 7d. Patient will remove at home then apply daily dry dressing PRN  Activity: NWB LLE. Work with PT with walker. Posterior precautions.  Swelling: Compression hose PRN  Dispo  Given injury, please ensure patient follows up with PCP and/or endocrinology for bone density assessment and possible treatment to prevent future injuries.  DC: Pending PT/OT. F/u 2wks after  MOISÉS.    3/11/2024 1130pm addendum: On closer review of the postoperative radiographs, there appears to be a slight transverse lucency at the base of the greater trochanter  Based on preop CT and intraop findings of the trochanter being attached distally to a large fracture fragment, I am inclined to think the radiograph is not showing a true unstable greater trochanter fracture. Out of caution, I have ordered no active abduction exercises.

## 2024-03-12 NOTE — PROGRESS NOTES
Occupational Therapy    Evaluation    Patient Name: José Barth  MRN: [id number]  Today's Date: 3/12/2024  Time Calculation  Start Time: 1516  Stop Time: 1535  Time Calculation (min): 19 min        Assessment:  OT Assessment: pt presents with NWB LLE, hip precautions, significant pain and decreased mobility/balance which impedes ADL performance. pt would benefit from skilled OT services to address these deficits and to facilitate highest level of independence.  Prognosis: Fair  Barriers to Discharge: Decreased caregiver support, Inaccessible home environment  Evaluation/Treatment Tolerance: Patient limited by fatigue, Patient limited by pain  Medical Staff Made Aware: Yes  End of Session Communication: Bedside nurse  End of Session Patient Position: Bed, 3 rail up, Alarm on  OT Assessment Results: Decreased ADL status, Decreased safe judgment during ADL, Decreased endurance, Decreased functional mobility  Prognosis: Fair  Barriers to Discharge: Decreased caregiver support, Inaccessible home environment  Evaluation/Treatment Tolerance: Patient limited by fatigue, Patient limited by pain  Medical Staff Made Aware: Yes  Strengths: Ability to acquire knowledge, Attitude of self  Plan:  Treatment Interventions: ADL retraining, Functional transfer training, UE strengthening/ROM, Endurance training, Equipment evaluation/education, Compensatory technique education  OT Frequency: 4 times per week  OT Discharge Recommendations: Moderate intensity level of continued care  Equipment Recommended upon Discharge: Wheeled walker, Bedside commode  OT Recommended Transfer Status: Assist of 2, Maximum assist  OT - OK to Discharge: Yes (OT Eval and POC initiated.)  Treatment Interventions: ADL retraining, Functional transfer training, UE strengthening/ROM, Endurance training, Equipment evaluation/education, Compensatory technique education    General:  General  Reason for Referral: POD 1 of Left Hip Total Arthroplasty Revision  after a fall resulting in a left hip periprosthetic fracture.  Past Medical History Relevant to Rehab: history of hypertension, hyperlipidemia and chronic atrial fibrillation  Family/Caregiver Present: Yes  Caregiver Feedback: wife and sister present  Prior to Session Communication: Bedside nurse  Patient Position Received: Bed, 3 rail up  Preferred Learning Style: auditory, kinesthetic  General Comment: pt agreeable with therapy however with significant anticipation of pain and requiring extended time to complete tasks with frequent rest breaks  Precautions:  LE Weight Bearing Status: Left Non-Weight Bearing  Medical Precautions: Fall precautions  Post-Surgical Precautions: Left hip precautions (no active abduction)  Braces Applied:  (abduction brace while in bed)  Vital Signs:     Pain:  Pain Assessment  Pain Assessment: 0-10  Pain Score: 8  Pain Type: Surgical pain  Pain Location: Hip  Pain Orientation: Left    Objective   Cognition:  Overall Cognitive Status: Impaired  Orientation Level: Disoriented to time  Safety Judgment: Decreased awareness of need for safety precautions  Safety/Judgement: Exceptions to WFL  Insight: Mild           Home Living:  Type of Home: House  Lives With: Spouse  Home Adaptive Equipment: Walker rolling or standard, Cane  Home Layout: One level  Home Access: Stairs to enter with rails  Entrance Stairs-Rails: Both  Entrance Stairs-Number of Steps: 3  Bathroom Shower/Tub: Walk-in shower  Bathroom Toilet: Handicapped height  Bathroom Equipment: Grab bars in shower, Built-in shower seat  Prior Function:  Level of [place]: Independent with ADLs and functional transfers, Independent with homemaking with ambulation  Receives Help From: Family  ADL Assistance: Independent  Homemaking Assistance: Independent  Ambulatory Assistance: Independent     ADL:  LE Dressing Assistance: Total  LE Dressing Deficit: Don/doff R sock, Don/doff L sock (due to hip precautions and decreased sitting  balance)  Activity Tolerance:  Endurance: Decreased tolerance for upright activites  Bed Mobility/Transfers: Bed Mobility 1  Bed Mobility 1: Supine to sitting  Level of Assistance 1: Maximum assistance  Bed Mobility Comments 1: significant extended time to complete due to anticipation of pain.  pt requiring assistance for managing BLE and lifting trunk.  cues for technique throughout due to hip precautions  Bed Mobility 2  Bed Mobility  2: Sitting to supine  Level of Assistance 2: Maximum assistance  Bed Mobility Comments 2: assist for lifting BLE back into bed and for lowering/positioning trunk.  cues for technique throughout    Transfers  Transfer: No (transfer deferred at this time due to significant pain and decreased tolerance for sitting edge of bed (pt also NWB LLE))    Sitting Balance:  Static Sitting Balance  Static Sitting-Balance Support: Feet supported, Bilateral upper extremity supported  Static Sitting-Level of Assistance: Minimum assistance, Contact guard  Static Sitting-Comment/Number of Minutes: initially MIN A for sitting but progressed to CGA with postural corrections.  pt with heavy reliance on BUE for support.      Vision:Vision - Basic Assessment  Current Vision: No visual deficits  Sensation:  Light Touch: No apparent deficits  Strength:  Strength Comments: BUE WFL; grossly 4+/5  Perception:  Inattention/Neglect: Appears intact  Coordination:  Movements are Fluid and Coordinated: Yes   Hand Function:  Gross Grasp: Functional  Coordination: Functional  Extremities: RUE   RUE : Within Functional Limits and LUE   LUE:  (Sh flex <120 degrees, WFL distally)    Outcome Measures:[facility] Daily Activity  Putting on and taking off regular lower body clothing: Total  Bathing (including washing, rinsing, drying): A lot  Putting on and taking off regular upper body clothing: A lot  Toileting, which includes using toilet, bedpan or urinal: Total  Taking care of personal grooming such as brushing teeth: A  lot  Eating Meals: A little  Daily Activity - Total Score: 11        Education Documentation  Body Mechanics, taught by Den Machuca OT at 3/12/2024  3:54 PM.  Learner: Patient  Readiness: Acceptance  Method: Explanation, Demonstration  Response: Needs Reinforcement    Precautions, taught by Den Machuca OT at 3/12/2024  3:54 PM.  Learner: Patient  Readiness: Acceptance  Method: Explanation, Demonstration  Response: Needs Reinforcement    ADL Training, taught by Den Machuca OT at 3/12/2024  3:54 PM.  Learner: Patient  Readiness: Acceptance  Method: Explanation, Demonstration  Response: Needs Reinforcement    Education Comments  No comments found.        OP EDUCATION:  Education  Individual(s) Educated: Patient  Education Comment: hip precautions, NWB LLE    Goals:  Encounter Problems       Encounter Problems (Active)       ADLs       Patient with complete upper body dressing with set-up level of assistance donning and doffing all UE clothes while edge of bed  (Progressing)       Start:  03/12/24    Expected End:  03/26/24            Patient with complete lower body dressing with moderate assist level of assistance donning and doffing all LE clothes  with PRN adaptive equipment while edge of bed  (Progressing)       Start:  03/12/24    Expected End:  03/26/24            Patient will complete toileting including hygiene clothing management/hygiene with moderate assist level of assistance and raised toilet seat and grab bars. (Progressing)       Start:  03/12/24    Expected End:  03/26/24               BALANCE       Patient will tolerate standing for 5 minutes to moderate assist level of assistance with least restrictive device in order to improve functional activity tolerance for ADL tasks. (Progressing)       Start:  03/12/24    Expected End:  03/26/24               COGNITION/SAFETY       Patient will recall and adhere to hip precautions during all functional mobility/ADL tasks in order to demonstrate  improved understanding and promote healing post op (Progressing)       Start:  03/12/24    Expected End:  03/26/24               TRANSFERS       Patient will perform bed mobility minimal assist  level of assistance and bed rails in order to improve safety and independence with mobility (Progressing)       Start:  03/12/24    Expected End:  03/26/24            Patient will complete functional transfer to toilet/commode with least restrictive device with moderate assist level of assistance. (Progressing)       Start:  03/12/24    Expected End:  03/26/24

## 2024-03-12 NOTE — CONSULTS
Wound Care Consult     Visit Date: 3/12/2024      Patient Name: José Barth         MRN: [id number]           YOB: 1945     Reason for Consult: Patient presents with mixed intertriginous dermatitis with fungal infection (satellite lesion and blisters) to the bilateral groin, thigh and scrotum.         Wound History: Patient states that this has been a problem at home for which he has tried over the counter solutions for.      Pertinent Labs:   Albumin   Date Value Ref Range Status   03/08/2024 3.3 (L) 3.4 - 5.0 g/dL Final       Wound Assessment:  Wound 03/11/24 Incision Leg Left;Proximal;Upper;Anterior (Active)   Site Assessment Clean;Dry;Intact 03/11/24 2350   An-Wound Assessment Intact 03/11/24 2350   Margins Attached edges 03/11/24 2350   Closure Approximated 03/11/24 2350   Sutures/Staple Line Approximated 03/11/24 2350   Drainage Description None 03/11/24 2350   Drainage Amount None 03/11/24 2350   Dressing Xeroform;Semi-permeable membrane 03/11/24 2350   Dressing Status Clean;Dry 03/11/24 2115       Wound 03/11/24 Moisture Associated Skin Damage Scrotum Bilateral (Active)   Site Assessment Yellow;White;Sloughing;Red;Painful;Maceration;Denuded 03/12/24 1130   An-Wound Assessment Intact;Clean;Blanched 03/12/24 1130   Non-staged Wound Description Partial thickness 03/12/24 1130   Shape denuded regions- scattered 03/12/24 1130   Margins Attached edges 03/12/24 1130   Drainage Description Yellow;Foul odor 03/12/24 1130   Drainage Amount Moderate 03/12/24 1130   Dressing Open to air 03/12/24 1130   Dressing Changed New 03/12/24 1130   Dressing Status Clean 03/12/24 1130     Wound Team Summary Assessment: Assessment complete and recommendation below. Red and weeping skin with white to yellow discharge in folds of groin, thigh and scrotum.  Slight odor to drainage.  Painful with erosions on scrotum and partial thickness region to the left inner thigh with satellight blisters and crusting.  Patient education provided.      Inner thigh, scrotum, groin: intertriginous dermatitis with fungal infection  2x per day: Bedside RN/LPN to complete wound care.  Cleanse with purple bath wipes or soap and water and pat THOROUGHLY par dry.  Apply no-sting barrier film to periwound skin.  Apply Nystatin cream to open redness and crusts in folds and satellight regions.  Apply Coloplast Triad Wound Dressing (orange tube) in dime thickness to the partial thickness wound are on left inner thigh and the scrotum.    Place Interdry to groin folds in single layer with at least 2 inches extended from fold.    While in bed patient should only be on one fitted sheet, and one chux. Please, do not use brief while patient is resting in bed. Elevate heels off the bed surface at all times. Turn and reposition at least every 2 hours.     Wound Team Plan: [facility] team will follow up next week if patient is still admitted.  Thank you for this consultation, while inpatient please contact with any questions or changes in condition.    Chaparrita Salcido RN, BSN, [facility]  Phone: [phone]  3/12/2024  3:32 PM

## 2024-03-12 NOTE — CONSULTS
INFECTIOUS DISEASE INPATIENT INITIAL CONSULTATION    Referred By: Kris Kuhn    Reason For Consult: left groin fungal rash     HPI:  This is a 79 y.o. male with PMH of A. Fib, HTN, DLD, hx left hip GITA who presented after fall.    He suffered laceration to thumb and also a mony-prosthetic left hip fracture. Went to OR on 3/11 and noted to have a significant fungal rash in the groin. I advised to give Fluconazole for this. Also on VI Vanc/Zosyn. Noted to have loose stem, hematoma without purulence. GITA revised with new hardware. Cultures from joint are pending. Wound swab from the groin has mixed GPC present.     Allergies:  Patient has no known allergies.     Vitals (Last 24 Hours):  Heart Rate:  []   Temp:  [31.7 °C (89.1 °F)-37.4 °C (99.3 °F)]   Resp:  [15-18]   BP: ()/(55-87)   SpO2:  [92 %-100 %]      PHYSICAL EXAM:  Gen - NAD  Heart - RRR  Lungs - no wheezing  Abd - soft, no ttp, BS present  Groin - bilateral groin with yeast infection  Skin - no rash    MEDS:    Current Facility-Administered Medications:     [Held by provider] atorvastatin (Lipitor) tablet 20 mg, 20 mg, oral, Daily, Kris Kuhn MD    benzocaine-menthol (Cepastat Sore Throat) 15-3.6 mg lozenge 1 lozenge, 1 lozenge, Mouth/Throat, q2h PRN, KARAN Alarcon-CNP, 1 lozenge at 03/12/24 0340    ceFAZolin in dextrose (iso-os) (Ancef) IVPB 2 g, 2 g, intravenous, q8h, Rodriguez Abdalla, PharmD, Last Rate: 0 mL/hr at 03/12/24 0300, 2 g at 03/12/24 1016    enoxaparin (Lovenox) syringe 40 mg, 40 mg, subcutaneous, q24h, KARAN Sahu-CNP, 40 mg at 03/12/24 1016    fluconazole (Diflucan) tablet 200 mg, 200 mg, oral, Daily, KARAN Ashby-CNP, 200 mg at 03/12/24 1020    fluconazole in NaCl (iso-osm) (Diflucan) IVPB 200 mg, 200 mg, intravenous, Once, Juno Ruffin MD    [MAR Hold] metoprolol tartrate (Lopressor) tablet 25 mg, 25 mg, oral, BID, KARAN Vaughn-CNP, 25 mg at 03/11/24 0827    nystatin  (Mycostatin) cream, , Topical, Once, Juno Ruffin MD    [MAR Hold] ondansetron (Zofran) injection 4 mg, 4 mg, intravenous, q8h PRN, Kris Kuhn MD    oxyCODONE (Roxicodone) immediate release tablet 10 mg, 10 mg, oral, q6h PRN, Bhavya R Corrao, APRN-CNP, 10 mg at 03/12/24 0223    oxyCODONE (Roxicodone) immediate release tablet 5 mg, 5 mg, oral, q6h PRN, Bhavya R Corrao, APRN-CNP    sodium chloride 0.9% infusion, 50 mL/hr, intravenous, Continuous, Kris Kuhn MD, Last Rate: 50 mL/hr at 03/10/24 0833, 50 mL/hr at 03/10/24 0833    vancomycin (Vancocin) in dextrose 5 % water (D5W) 500 mL IV 1,500 mg, 1,500 mg, intravenous, q24h, Ronn Casey, JoseD    vancomycin (Vancocin) placeholder, , miscellaneous, Daily PRN, Juno Ruffin MD     LABS:  Lab Results   Component Value Date    WBC 10.5 03/12/2024    HGB 9.2 (L) 03/12/2024    HCT 27.7 (L) 03/12/2024    MCV 98 03/12/2024    PLT 97 (L) 03/12/2024      Results from last 72 hours   Lab Units 03/12/24  0656   SODIUM mmol/L 134*   POTASSIUM mmol/L 4.3   CHLORIDE mmol/L 103   CO2 mmol/L 23   BUN mg/dL 21   CREATININE mg/dL 0.86   GLUCOSE mg/dL 201*   CALCIUM mg/dL 7.0*   ANION GAP mmol/L 12   EGFR mL/min/1.73m*2 88         Estimated Creatinine Clearance: 68.5 mL/min (by C-G formula based on SCr of 0.86 mg/dL).  C-Reactive Protein   Date/Time Value Ref Range Status   03/09/2024 06:51 AM 13.09 (H) <1.00 mg/dL Final     Sedimentation Rate   Date/Time Value Ref Range Status   03/09/2024 06:51 AM 6 0 - 20 mm/h Final   05/13/2022 11:22 AM 3 0 - 20 mm/h Final        IMAGING:  X-Ray Left Hip 3/12  Impression:     Interval revision of previous left hip arthroplasty with  periprosthetic fracture. There is a new long-stem femoral component  in place with multiple proximal femoral cerclage wires and anatomic  alignment of the previous fracture fragments.     X-Ray Pelvis 3/8  Impression:     Acute fracture of the proximal left femoral shaft.     CT Left Hip 3/7  Impression:      Acute, displaced left periprosthetic proximal femur fracture.      Acute, nondisplaced left pubic rami fractures.      Edema/hematoma in the anterior compartment of the thigh and adjacent  to the inferior pubic ramus.       ASSESSMENT/PLAN:    Groin Candidiasis and Wounds - fissure type wounds in both groins from excess moisture. Nystatin powder to groins. Culture is not relevant - reflects local liudmila. No need for abx.     Will sign off. Please call back with questions. Thanks!    Garret Dejesus MD  [facility]  Office #[phone]

## 2024-03-12 NOTE — OP NOTE
Left Hip Total Arthroplasty Revision (L) Operative Note     Date: 3/11/2024  OR Location: [facility] A OR    Name: José Barth, : 1945, Age: 79 y.o., MRN: [id number], Sex: male    Diagnosis  Pre-op Diagnosis     * Periprosthetic fracture of hip, initial encounter [M97.8XXA, Z96.649] Post-op Diagnosis     * Periprosthetic fracture of hip, initial encounter [M97.8XXA, Z96.649]     Procedures  Left Hip Total Arthroplasty Revision  78254 - IN REVJ TOT HIP ARTHRP FEM ONLY W/WO ALGRFT      Surgeons      * Juno Ruffin - Primary    Resident/Fellow/Other Assistant:  Surgeon(s) and Role:     * Teddy Owusu MD - Assisting     * Vince Enamorado MD - Assisting     * Uriel Drew MD - Resident - Assisting    Procedure Summary  Anesthesia: General  ASA: III  Anesthesia Staff: Anesthesiologist: Alessandro Mcdowell MD  CRNA: KARAN Eric-CRNA  C-AA: KALI Box; KALI Severino; KALI Simental  Estimated Blood Loss: 700mL  Intra-op Medications:   Administrations occurring from 1300 to 1700 on 24:   Medication Name Total Dose   sodium chloride 0.9 % irrigation solution 1,000 mL              Anesthesia Record               Intraprocedure I/O Totals          Intake    Tranexamic Acid 0.00 mL    The total shown is the total volume documented since Anesthesia Start was filed.    Phenylephrine Drip 0.00 mL    The total shown is the total volume documented since Anesthesia Start was filed.    lactated Ringer's 2500.00 mL    Total Intake 2500 mL       Output    Urine 700 mL    Est. Blood Loss 1100 mL    Total Output 1800 mL       Net    Net Volume 700 mL          Specimen:   ID Type Source Tests Collected by Time   1 : LEFT HIP JOINT Non-Gynecologic Cytology SYNOVIAL FLUID CYTOLOGY CONSULTATION (NON-GYNECOLOGIC) Juno Ruffin MD 3/11/2024 2408   A : Left Groin Superficial Swab ABSCESS FUNGAL CULTURE/SMEAR, TISSUE/WOUND CULTURE/SMEAR Juno Ruffin MD 3/11/2024 2945   B :  LEFT HIP CAPSULE 1 Swab HIP ARTHROPLASTY LEFT TISSUE/WOUND CULTURE/SMEAR Juno HOLLY Ruffin MD 3/11/2024 1711   C : LEFT HIP CAPSULE 2 Swab HIP ARTHROPLASTY LEFT TISSUE/WOUND CULTURE/SMEAR Juno HOLLY Ruffin MD 3/11/2024 1715   D : LEFT HIP INTRA-ARTICULAR Swab HIP ARTHROPLASTY LEFT TISSUE/WOUND CULTURE/SMEAR Juno HOLLY Ruffin MD 3/11/2024 1718   E : PIRIFORMIS FOSSA 1 Swab HIP ARTHROPLASTY LEFT TISSUE/WOUND CULTURE/SMEAR Juno HOLLY Ruffin MD 3/11/2024 1927   F : PIRIFORMIS FOSSA 2 Swab HIP ARTHROPLASTY LEFT TISSUE/WOUND CULTURE/SMEAR Juno HOLLY Ruffin MD 3/11/2024 1928        Staff:   Circulator: Shaylee Leach RN; Alondra Jean Baptiste RN; Katty Zee RN  Relief Circulator: Adri Delacruz RN  Relief Scrub: Elif Rojas  Scrub Person: Fátima Ricci; Mickey Gilman; Janell Lambert         Drains and/or Catheters:   Open Drain Left;Proximal;Anterior Hip (Active)   Dressing Status Clean;Dry 03/11/24 2115   Drainage Appearance Bloody 03/11/24 2115   Status Other (Comment) 03/11/24 2115   Output (mL) 150 mL 03/11/24 2330       Urethral Catheter Non-latex 16 Fr. (Active)   Site Assessment Clean;Skin intact 03/11/24 2115   Collection Container Standard drainage bag 03/11/24 2115   Securement Method Securing device (Describe) 03/11/24 2115   Output (mL) 260 mL 03/11/24 2330       [REMOVED] External Urinary Catheter (Removed)   Output (mL) 300 mL 03/11/24 1218       Tourniquet Times:         Implants:  Implants       Type Name Action Serial No.      Screw CABLE W/CRIMP, 1.7 X 750MM, SS, STERILE - [id number] Implanted       Distal Tapered Stem [id number] angled Implanted      Joint Hip FEMORAL HEAD, CERAMIC 36 +8.5 - SNA - [id number] Implanted NA      [id number] Proximal body Implanted             Physician Narrative  Preoperative Diagnosis: Left total hip arthroplasty periprosthetic femur fracture  Postoperative Diagnosis: Left total hip arthroplasty periprosthetic femur fracture  Procedure: Left total hip arthroplasty stem/head  revision, open reduction internal fixation of periprosthetic femur fracture  Surgical Approach: Posterior  Implants: Sizes per above record. Depuy Reclaim modular fluted tapered stem with ceramic head.      Findings  Periprosthetic femur fracture with grossly loose stem ([place] B2).      Preoperative Course  Please see clinic/consult notes for full discussion of history, indications, and treatment alternatives. Briefly, the patient had sustained a periprosthetic femure fracture. We had an extensive discussion regarding conservative and surgical options, and the patient elected to proceed with surgery.      Procedure  A first assistant actively participated and was necessary for one or more of the following: opening, exposure and visualization during the case, maintaining hemostasis, wound closure resulting in its safe and expeditious completion. I was present for the entire procedure.    The patient was identified in the preoperative area. The operative site was marked, informed consent reviewed, and operative procedure confirmed. The patient was taken to the operating room and anesthesia achieved. A soria catheter was inserted.    While placing the soria, we noticed areas of skin excoriation with exudate bilaterally between the scrotum and thighs. One of my urology colleagues examined the patient and felt it was likely a yeast infection. After conferring with several senior partners (an orthopedic traumatologist and several arthroplasty surgeons), I decided to proceed with surgery given the non-elective nature of the case and the morbidity of delaying surgery for weeks while the groin was treated. I thoroughly cleaned and debrided the area, covered it with an OR towel, and sealed off the area with Ioban. My team reached out to Infectious Disease, who recommended local wound care (consult placed) and fluconazole (first dose given IV Intraop) and will see the patient in the hospital to finalize recommendations.      With the area sealed off, the patient was positioned on the surgical table with bony prominences well-padded. The operative extremity was sterilely prepped and draped. A surgical time-out was performed to confirm the patient, site, procedure, and administration of TXA and prophylactic antibiotics (vancomycin and ancef given the revision setting). New gloves were donned after draping and prior to closure, at which time a fresh closing tray with clean instruments was used. An additional dose of TXA was given when closing the wound.    The previous scar was incised, extended distally, and carried down to the fascia scotty. The fascia and gluteus braden were split in line with the incision. The trochanteric bursa was reflected posteriorly. After protecting the gluteus medius and minimus, distinct external rotator muscles were not discernible. A sleeve of posterior soft tissue and capsule was tagged and reflected, taking care to not involve the sciatic nerve. Entry into the joint revealed fracture hematoma with no gross signs of infection. 40cc of fluid was sent via syringe for analysis.    The incision and fascial dissection was carried distally, and a subvastus approach was used to visualize the distal fracture extent. Temporary cerclage cables secured the fracture around the stem.    The hip was dislocated and femoral head impacted off. The femoral stem trunion showed no signs of wear. The acetabular shell was tested and found to be well fixed. The polyethylene acetabular liner was carefully inspected and found to be in excellent condition. Give the index surgery was only 1.5 years ago, I decided to leave the existing liner in place. Five tissue samples were sent.     The femoral stem was grossly loose and easily removed. At this point, my orthopedic trauma , Dr. Enamorado, had come to the OR and kindly offered to help with fracture reduction. Once the fracture was reduced, sequentially secured with  cerclage cabling, and verified on intraoperative imaging, Dr. Enamorado departed. Following that, my senior arthroplasty partner, Dr. Owusu, who had just finished his cases for the day, offered to help and scrubbed in, staying until we began closing.     With the most distal reduction cerclage cable placed right at the end of the fracture, I placed one more distally to prevent fracture propagation at the intact isthmus. The femur was sequentially reamed with straight tapered reamers until axial and rotational stability was achieved. Imaging verified reaming positioning and fixation at least 2 cortical widths past the distal extent of the fracture. The diaphyseal engaging tapered-fluted stem was selected and inserted with axial and rotational stability. Reaming was performed for the proximal body.    Trial reductions were performed with varying proximal body and femoral head lengths until adequate leg-length and hip stability were achieved. Intraoperative radiographs were obtained to confirm acceptable position and fit of the implants. The final proximal body was applied to the stem and locking bolt inserted. A trial reduction was performed to confirm the femoral head length.   The trunnion was cleaned and dried, and the final femoral head was impacted in placed. The hip was reduced.    The joint and wound were irrigated with betadine/saline solution then pulse lavage saline. A hemovac drain was placed deep. 1 gram of vancomycin powder was placed in the wound. 2 drill holes were made in the greater trochanter with a 2.0 drill bit, and the posterior capsule was repaired to the trochanter. The sciatic nerve was palpated and noted to be free and undamaged. Standard layered closure of the fascia (interrupted #1 monofilament), deep dermal tissues (interrupted #1 monofilament), and skin (2-0 monofilament, 3-0 nylon) was performed. Xeroform and a waterproof dressing were applied. Anesthesia was concluded. The groin  excoriations were covered with xeroform pending wound care recommendations. The patient was transferred to the stretcher and recovery room in stable condition.    Information and Plan  Nerve block: PENG. Additional nerve block not available.  Complications: None apparent  Weightbearing status: TTWB LLE  Anticoagulation: Pending AM H&H  Activity restrictions: No active abduction  Disposition: To floor      3/12/2023 Billing Addendum:  -Due to the complexity of the case and manipulation necessary, a second surgeon was needed for assistance.  -Adding procedure code for femur open reduction and internal fixation.

## 2024-03-13 VITALS
HEART RATE: 109 BPM | HEIGHT: 69 IN | DIASTOLIC BLOOD PRESSURE: 65 MMHG | WEIGHT: 160 LBS | TEMPERATURE: 97.8 F | RESPIRATION RATE: 18 BRPM | BODY MASS INDEX: 23.7 KG/M2 | SYSTOLIC BLOOD PRESSURE: 100 MMHG | OXYGEN SATURATION: 97 %

## 2024-03-13 LAB
ANION GAP SERPL CALC-SCNC: 8 MMOL/L (ref 10–20)
BACTERIA SPEC CULT: ABNORMAL
BUN SERPL-MCNC: 22 MG/DL (ref 6–23)
CALCIUM SERPL-MCNC: 7.4 MG/DL (ref 8.6–10.3)
CHLORIDE SERPL-SCNC: 104 MMOL/L (ref 98–107)
CO2 SERPL-SCNC: 27 MMOL/L (ref 21–32)
CREAT SERPL-MCNC: 0.74 MG/DL (ref 0.5–1.3)
EGFRCR SERPLBLD CKD-EPI 2021: >90 ML/MIN/1.73M*2
ERYTHROCYTE [DISTWIDTH] IN BLOOD BY AUTOMATED COUNT: 13.5 % (ref 11.5–14.5)
GLUCOSE SERPL-MCNC: 123 MG/DL (ref 74–99)
GRAM STN SPEC: ABNORMAL
GRAM STN SPEC: ABNORMAL
HCT VFR BLD AUTO: 21.9 % (ref 41–52)
HGB BLD-MCNC: 8.1 G/DL (ref 13.5–17.5)
INR PPP: 1.8 (ref 0.9–1.1)
MCH RBC QN AUTO: 33.2 PG (ref 26–34)
MCHC RBC AUTO-ENTMCNC: 37 G/DL (ref 32–36)
MCV RBC AUTO: 90 FL (ref 80–100)
NRBC BLD-RTO: 0 /100 WBCS (ref 0–0)
PLATELET # BLD AUTO: 130 X10*3/UL (ref 150–450)
POTASSIUM SERPL-SCNC: 4.4 MMOL/L (ref 3.5–5.3)
PROTHROMBIN TIME: 20.4 SECONDS (ref 9.8–12.8)
RBC # BLD AUTO: 2.44 X10*6/UL (ref 4.5–5.9)
SODIUM SERPL-SCNC: 135 MMOL/L (ref 136–145)
WBC # BLD AUTO: 11.3 X10*3/UL (ref 4.4–11.3)

## 2024-03-13 PROCEDURE — 2500000004 HC RX 250 GENERAL PHARMACY W/ HCPCS (ALT 636 FOR OP/ED)

## 2024-03-13 PROCEDURE — 2500000001 HC RX 250 WO HCPCS SELF ADMINISTERED DRUGS (ALT 637 FOR MEDICARE OP): Performed by: NURSE PRACTITIONER

## 2024-03-13 PROCEDURE — 97535 SELF CARE MNGMENT TRAINING: CPT | Mod: GO

## 2024-03-13 PROCEDURE — 80048 BASIC METABOLIC PNL TOTAL CA: CPT

## 2024-03-13 PROCEDURE — 2500000001 HC RX 250 WO HCPCS SELF ADMINISTERED DRUGS (ALT 637 FOR MEDICARE OP)

## 2024-03-13 PROCEDURE — 85027 COMPLETE CBC AUTOMATED: CPT

## 2024-03-13 PROCEDURE — 36415 COLL VENOUS BLD VENIPUNCTURE: CPT

## 2024-03-13 PROCEDURE — 99232 SBSQ HOSP IP/OBS MODERATE 35: CPT | Performed by: INTERNAL MEDICINE

## 2024-03-13 PROCEDURE — 99239 HOSP IP/OBS DSCHRG MGMT >30: CPT | Performed by: INTERNAL MEDICINE

## 2024-03-13 PROCEDURE — 97530 THERAPEUTIC ACTIVITIES: CPT | Mod: GP,CQ

## 2024-03-13 PROCEDURE — 97530 THERAPEUTIC ACTIVITIES: CPT | Mod: GO

## 2024-03-13 PROCEDURE — 97110 THERAPEUTIC EXERCISES: CPT | Mod: GP,CQ

## 2024-03-13 PROCEDURE — 85610 PROTHROMBIN TIME: CPT

## 2024-03-13 PROCEDURE — 2500000001 HC RX 250 WO HCPCS SELF ADMINISTERED DRUGS (ALT 637 FOR MEDICARE OP): Performed by: INTERNAL MEDICINE

## 2024-03-13 RX ORDER — ENOXAPARIN SODIUM 150 MG/ML
1.5 INJECTION SUBCUTANEOUS EVERY 24 HOURS
Start: 2024-03-13

## 2024-03-13 RX ORDER — WARFARIN 2 MG/1
4 TABLET ORAL DAILY
Status: DISCONTINUED | OUTPATIENT
Start: 2024-03-13 | End: 2024-03-13 | Stop reason: HOSPADM

## 2024-03-13 RX ORDER — OXYCODONE HYDROCHLORIDE 5 MG/1
5 TABLET ORAL EVERY 6 HOURS PRN
Qty: 10 TABLET | Refills: 0 | Status: SHIPPED | OUTPATIENT
Start: 2024-03-13

## 2024-03-13 RX ORDER — NYSTATIN 100000 [USP'U]/G
1 POWDER TOPICAL 3 TIMES DAILY
Start: 2024-03-13

## 2024-03-13 RX ORDER — OXYCODONE HYDROCHLORIDE 5 MG/1
5 TABLET ORAL EVERY 6 HOURS PRN
Qty: 12 TABLET | Refills: 0 | OUTPATIENT
Start: 2024-03-13 | End: 2024-03-16

## 2024-03-13 RX ORDER — CEFADROXIL 500 MG/1
500 CAPSULE ORAL 2 TIMES DAILY
Qty: 14 CAPSULE | Refills: 0
Start: 2024-03-13 | End: 2024-03-20

## 2024-03-13 RX ORDER — ACETAMINOPHEN 325 MG/1
650 TABLET ORAL EVERY 6 HOURS PRN
Qty: 30 TABLET | Refills: 0 | Status: SHIPPED | OUTPATIENT
Start: 2024-03-13

## 2024-03-13 RX ADMIN — SODIUM CHLORIDE 50 ML/HR: 9 INJECTION, SOLUTION INTRAVENOUS at 08:13

## 2024-03-13 RX ADMIN — CEFAZOLIN SODIUM 2 G: 2 INJECTION, SOLUTION INTRAVENOUS at 09:58

## 2024-03-13 RX ADMIN — NYSTATIN 1 APPLICATION: 100000 POWDER TOPICAL at 00:24

## 2024-03-13 RX ADMIN — OXYCODONE HYDROCHLORIDE 5 MG: 5 TABLET ORAL at 03:59

## 2024-03-13 RX ADMIN — FLUCONAZOLE 200 MG: 100 TABLET ORAL at 09:17

## 2024-03-13 RX ADMIN — NYSTATIN 1 APPLICATION: 100000 POWDER TOPICAL at 14:54

## 2024-03-13 RX ADMIN — WARFARIN SODIUM 4 MG: 2 TABLET ORAL at 17:06

## 2024-03-13 RX ADMIN — NYSTATIN 1 APPLICATION: 100000 POWDER TOPICAL at 09:19

## 2024-03-13 RX ADMIN — CEFAZOLIN SODIUM 2 G: 2 INJECTION, SOLUTION INTRAVENOUS at 04:00

## 2024-03-13 RX ADMIN — METOPROLOL TARTRATE 25 MG: 25 TABLET, FILM COATED ORAL at 09:17

## 2024-03-13 RX ADMIN — OXYCODONE HYDROCHLORIDE 10 MG: 5 TABLET ORAL at 11:21

## 2024-03-13 RX ADMIN — ENOXAPARIN SODIUM 105 MG: 120 INJECTION SUBCUTANEOUS at 14:52

## 2024-03-13 RX ADMIN — SODIUM CHLORIDE 50 ML/HR: 9 INJECTION, SOLUTION INTRAVENOUS at 09:57

## 2024-03-13 ASSESSMENT — PAIN SCALES - GENERAL
PAINLEVEL_OUTOF10: 6
PAINLEVEL_OUTOF10: 6
PAINLEVEL_OUTOF10: 0 - NO PAIN
PAINLEVEL_OUTOF10: 0 - NO PAIN
PAINLEVEL_OUTOF10: 6

## 2024-03-13 ASSESSMENT — COGNITIVE AND FUNCTIONAL STATUS - GENERAL
HELP NEEDED FOR BATHING: A LOT
DRESSING REGULAR LOWER BODY CLOTHING: TOTAL
TURNING FROM BACK TO SIDE WHILE IN FLAT BAD: A LOT
CLIMB 3 TO 5 STEPS WITH RAILING: TOTAL
EATING MEALS: A LITTLE
STANDING UP FROM CHAIR USING ARMS: TOTAL
MOVING FROM LYING ON BACK TO SITTING ON SIDE OF FLAT BED WITH BEDRAILS: A LOT
PERSONAL GROOMING: A LOT
TOILETING: TOTAL
WALKING IN HOSPITAL ROOM: TOTAL
MOBILITY SCORE: 9
MOVING TO AND FROM BED TO CHAIR: A LOT
DRESSING REGULAR UPPER BODY CLOTHING: A LOT
DAILY ACTIVITIY SCORE: 11

## 2024-03-13 ASSESSMENT — PAIN - FUNCTIONAL ASSESSMENT
PAIN_FUNCTIONAL_ASSESSMENT: 0-10

## 2024-03-13 ASSESSMENT — ACTIVITIES OF DAILY LIVING (ADL): HOME_MANAGEMENT_TIME_ENTRY: 10

## 2024-03-13 NOTE — PROGRESS NOTES
S: NAEON. Comfortable and pleased with care. Understands activity restrictions.          O  VS:  Temp:  [36.2 °C (97.2 °F)-37.1 °C (98.8 °F)] 36.3 °C (97.3 °F)  Heart Rate:  [102-124] 108  Resp:  [16-18] 18  BP: (100-117)/(62-72) 108/62    Gen: NAD  Focused exam of LLE  -Dressing with minor stains  -SILT in S/S/SP/DP/T/F distributions  -Able to flex Q, TA, GS. Of note, TA 3/5.  -Palpable DP/PT pulse (only 1+, but symmetric to RLE), BCR  -SCDs in place  Groin  -Nystatin powder, cloth coverings    Drain  HV x1 (30cc last 24hrs), removed by me today  Micro  OR tissue cx x5: NGTD  OR aspiration cx x1: Pending  (Orders apparently lost from OR. Team working with lab to rectify)  OR aspiration cell count/diff x1: Pending (Orders apparently lost from OR. Team working with lab to rectify)  OR groin swab x1 (pending): GS abundant PMN, mixed GP bacteria. NGTD.  PT/OT  Continue today  Labs (pertinent)  Hgb 8.1  INR 1.8  Platelets 97  Imaging  No new imaging            A&P: José Barth is a 79 y.o. male presenting with:  Left nondisplaced LC1 fracture  Discussed this pattern is typically treated with progressive WBAT with a walker. Pt agreed with this plan.  Groin excoriation  ID consult: Candidiasis. Rec nystatin powder. Cx not relevant (reflects local liudmila). No need for abx. Signed off.  Wound care consult: Appreciate care.  L GITA periprosthetic femur fracture  Cardiology consult re afib: Restart home metaprolol. Switch LVX to home warfarin when ok from ortho standpoint.   IM primary  Anticoag: Early mobilization. Ok to resume warfarin from my perspective.  Diet: Routine. DC IVF when adequate PO intake  Surgical care  Abx: Revision abx (EOAP). Will continue ancef until hip cx negative.  Dressing: Aquacel/Mepilex for 7d. Patient will remove at home then apply daily dry dressing PRN  Activity: NWB LLE. Work with PT with walker. Posterior precautions. No active abduction.  Swelling: Compression hose PRN  Dispo  Given  injury, please ensure patient follows up with PCP and/or endocrinology for bone density assessment and possible treatment to prevent future injuries.  DC: Pending PT/OT. F/u 2wks after DC.        3/13/2024 Addendum: Pt discharged to SNF. Unfortunately, aspiration results will not be available due to miscommunication with lab. Given very low clinical suspicion, including no suggestive intra-op findings, pt was discharged on 7d cefadroxil. Will follow-up remainder of OR cx results and adjust as needed.

## 2024-03-13 NOTE — NURSING NOTE
Interdisciplinary team present: NP, PT, NM, CC, SW, Orthopedic Coordinator, and bedside RN.  Pain - controlled  Nausea - none  Discharge barrier - NWB. Not up yet  Discharge plan - pending  Discharge date/time - pending

## 2024-03-13 NOTE — ANESTHESIA POSTPROCEDURE EVALUATION
Patient: José Barth    Procedure Summary       Date: 03/11/24 Room / Location: [facility] / [facility]    Anesthesia Start: 1358 Anesthesia Stop: 2124    Procedure: Left Hip Total Arthroplasty Revision (Left: Hip) Diagnosis:       Periprosthetic fracture of hip, initial encounter      (Periprosthetic fracture of hip, initial encounter [M97.8XXA, Z96.649])    Surgeons: Juno Ruffin MD Responsible Provider: Alessandro Mcdowell MD    Anesthesia Type: general ASA Status: 3            Anesthesia Type: general    Vitals Value Taken Time   /72 03/11/24 2350   Temp 36.2 °C (97.2 °F) 03/11/24 2350   Pulse 97 03/11/24 2352   Resp 15 03/11/24 2350   SpO2 98 % 03/11/24 2350   Vitals shown include unvalidated device data.    Anesthesia Post Evaluation    Patient location during evaluation: bedside  Patient participation: complete - patient participated  Level of consciousness: awake  Pain management: adequate  Multimodal analgesia pain management approach  Airway patency: patent  Cardiovascular status: stable  Respiratory status: spontaneous ventilation and unassisted  Hydration status: acceptable  Postoperative Nausea and Vomiting: none  Comments: No significant PONV.        No notable events documented.

## 2024-03-13 NOTE — PROGRESS NOTES
"Subjective Data:  Patient seen and examined, chart reviewed  -- No acute events reported, recorded  -- Rates overnight better controlled.  When seen, he has about to receive oral medications, as well as having dressing adjusted    Cardiac telemetry shows atrial fibrillation with elevated heart rates         Objective Data:  Last Recorded Vitals:  Vitals:    03/12/24 2320 03/13/24 0245 03/13/24 0802 03/13/24 1144   BP: 100/66 105/65 108/62 97/54   BP Location:   Left arm Left arm   Patient Position:    Sitting   Pulse: (!) 119 103 108 95   Resp: 16 16 18 18   Temp: 36.3 °C (97.4 °F) 36.2 °C (97.2 °F) 36.3 °C (97.3 °F) 36.9 °C (98.4 °F)   TempSrc:    Temporal   SpO2: 95% 95% 95% 96%   Weight:       Height:           Last Labs:  CBC - 3/13/2024:  6:58 AM  11.3 8.1 130    21.9      CMP - 3/13/2024:  6:58 AM  7.4 5.1 18 --- 2.0   _ 3.3 19 67      PTT - No results in last year.  1.8   20.4 _     VLDL   Date/Time Value Ref Range Status   11/08/2021 09:55 AM 12 0 - 40 mg/dL Final   05/15/2019 07:36 AM 11 0 - 40 mg/dL Final      Last I/O:  I/O last 3 completed shifts:  In: 4128 (56.9 mL/kg) [P.O.:1080; I.V.:2298 (31.7 mL/kg); Blood:350; IV Piggyback:400]  Out: 4020 (55.4 mL/kg) [Urine:3010 (1.2 mL/kg/hr); Drains:310; Blood:700]  Weight: 72.6 kg     Past Cardiology Tests (Last 3 Years):  EKG:  Electrocardiogram, 12-lead PRN ACS symptoms 03/09/2024      ECG 12 lead 03/08/2024 (Preliminary)      ECG 12 lead 03/07/2024 (Preliminary)    Echo:  No results found for this or any previous visit from the past 1095 days.    Ejection Fractions:  No results found for: \"EF\"  Cath:  No results found for this or any previous visit from the past 1095 days.    Stress Test:  No results found for this or any previous visit from the past 1095 days.    Cardiac Imaging:  No results found for this or any previous visit from the past 1095 days.      Inpatient Medications:  Scheduled medications   Medication Dose Route Frequency    [Held by provider] " atorvastatin  20 mg oral Daily    ceFAZolin in dextrose (iso-os)  2 g intravenous q8h    enoxaparin  1.5 mg/kg subcutaneous q24h    fluconazole  200 mg oral Daily    metoprolol tartrate  25 mg oral BID    nystatin  1 Application Topical TID    warfarin  4 mg oral Daily     PRN medications   Medication    benzocaine-menthol    ondansetron    oxyCODONE    oxyCODONE     Continuous Medications   Medication Dose Last Rate    sodium chloride 0.9%  50 mL/hr 50 mL/hr (03/13/24 1156)       Physical Exam:  General:  Patient is awake, alert, and oriented.  Patient is in no acute distress.  HEENT:  Pupils equal and reactive.  Normocephalic.  Moist mucosa.    Neck:  No thyromegaly.  Normal Jugular Venous Pressure.  Cardiovascular: Irregularly irregular with variable S1, S2  Pulmonary:  Clear to auscultation bilaterally.  Abdomen:  Soft. Non-tender.   Non-distended.  Positive bowel sounds.  Lower Extremities:  2+ pedal pulses. No LE edema.  Currently in a brace  Neurologic:  Cranial nerves intact.  No focal deficit.   Skin: Skin warm and dry, normal skin turgor.   Psychiatric: Normal affect.        Assessment/Plan     Permanent Atrial Fibrillation with accelerated rates felt due to both Surgical procedure, pain and medications being held.   Resume Oral Metoprolol and address pain as per Primary Service.     Overlap Anticoagulation.  Would suggest 1 mg / kg BID Dosing of Enoxaparin while resuming Warfarin and continue untilINR at Goal for 2-3 days before stopping Enoxaparin.    Follow up with Established cardiology care team at discharge.     No additional recommendations at this time.  Call if there are further questions occurrence or concerns    Ongoing disposition planning as per primary service    Cardiology will sign off at this time          Peripheral IV 03/11/24 20 G Right Forearm (Active)   Site Assessment Clean;Dry;Intact 03/13/24 0751   Dressing Status Dry;Clean 03/13/24 0751   Number of days: 2       Peripheral IV  03/11/24 18 G Left Hand (Active)   Site Assessment Clean;Dry;Intact 03/13/24 0751   Dressing Status Clean;Dry 03/13/24 0751   Number of days: 2       Code Status:  Full Code    I spent 35 minutes in the professional and overall care of this patient.        Dave Leach, DO

## 2024-03-13 NOTE — PROGRESS NOTES
PROGRESS NOTE - INTERNAL MEDICINE     PATIENT NAME:  José Barth    MRN:  [id number]  SERVICE DATE:  3/13/2024   SERVICE TIME:  8:21 AM     ADMITTING PHYSICIAN:  Kris Kuhn MD  ASSESSMENT:  Status post left hip arthroplasty for periprosthetic fracture.  History of hypertension  Chronic atrial fibrillation with variable rate, was on Coumadin on admission which has been transitioned to Lovenox prior to surgery and will resume Coumadin now.  Hyperlipidemia  Intertrigo in the inguinal area, advised to apply nystatin powder 3 times daily as needed.         PLAN  Patient was not seen on the day of surgery because he was in surgery and did not come back to the floor until after midnight.  Patient seen today, he is currently following commands site of surgery otherwise stable.  Remains in atrial fibrillation with controlled ventricular rate on metoprolol resumed.  Reviewed all labs and imaging studies,  Cardiology and infectious disease on the case  Continue current medication and anticoagulation.  Discussed with orthopedics and patient is stable for discharge to skilled nursing facility when bed available.    SUBJECTIVE  CHIEF COMPLAINT:   Admitted after a fall sustaining a left periprosthetic hip fracture.     INTERVAL HISTORY OF PRESENT ILLNESS: Patient seen and examined, status post left hip periprosthetic fracture arthroplasty on March 11, 2024 on postoperatively coming along fine.  Remains in atrial fibrillation, rate is controlled, metoprolol resumed and Coumadin resumed and INR is 1.8 today.  Advised to discontinue Lovenox.  Stable for discharge to skilled nursing facility today.            MEDICATIONS:   Current Facility-Administered Medications:     [Held by provider] atorvastatin (Lipitor) tablet 20 mg, 20 mg, oral, Daily, MARTA Sahu    benzocaine-menthol (Cepastat Sore Throat) 15-3.6 mg lozenge 1 lozenge, 1 lozenge, Mouth/Throat, q2h PRN, MARTA Sahu, 1 lozenge at  "03/12/24 0340    ceFAZolin in dextrose (iso-os) (Ancef) IVPB 2 g, 2 g, intravenous, q8h, MARTA Sahu, Stopped at 03/13/24 0430    enoxaparin (Lovenox) syringe 105 mg, 1.5 mg/kg, subcutaneous, q24h, MARTA Sahu    fluconazole (Diflucan) tablet 200 mg, 200 mg, oral, Daily, Cheyenne García, APRN-CNP, 200 mg at 03/12/24 1020    metoprolol tartrate (Lopressor) tablet 25 mg, 25 mg, oral, BID, Kacy Richards, APRN-CNP, 25 mg at 03/12/24 2343    nystatin (Mycostatin) 100,000 unit/gram powder 1 Application, 1 Application, Topical, TID, Garret Dejesus MD, 1 Application at 03/13/24 0024    ondansetron (Zofran) injection 4 mg, 4 mg, intravenous, q8h PRN, MARTA Sahu    oxyCODONE (Roxicodone) immediate release tablet 10 mg, 10 mg, oral, q6h PRN, MARTA Sahu, 10 mg at 03/12/24 1925    oxyCODONE (Roxicodone) immediate release tablet 5 mg, 5 mg, oral, q6h PRN, MARTA Sahu, 5 mg at 03/13/24 0359    sodium chloride 0.9% infusion, 50 mL/hr, intravenous, Continuous, MARTA Sahu, Last Rate: 50 mL/hr at 03/13/24 0813, 50 mL/hr at 03/13/24 0813        OBJECTIVE  Visit Vitals  /62 (BP Location: Left arm)   Pulse 108   Temp 36.3 °C (97.3 °F)   Resp 18   Ht 1.753 m (5' 9.02\")   Wt 72.6 kg (160 lb)   SpO2 95%   BMI 23.62 kg/m²   Smoking Status Never   BSA 1.88 m²      PHYSICAL EXAM:   GENERAL:  Alert, no  distress, cooperative  SKIN:  Skin color, texture, turgor normal. No rashes or lesions.  OROPHARYNX:  Lips, mucosa, and tongue are normal.Teeth and gums, normal. Oropharynx normal.  NECK:  No jugulovenous distention, No carotid bruits, Carotid pulse normal contour, Supple  LUNGS:  Lungs clear to auscultation. Good diaphragmatic excursion.  CARDIAC: Remains in atrial fibrillation with variable ventricular rate ,normal S1 and S2; no rubs,  or gallops, 2/6 systolic ejection murmur left sternal border, no CHF.  ABDOMEN:  Abdomen soft, non-tender, BS normal, " No masses or organomegaly  Musculoskeletal, status post left hip arthroplasty for periprosthetic fracture on March 11, 2024.  EXTREMETIES:  Extremities normal, no deformities, edema, clubbing or skin discoloration. Good capillary refill., No ulcers  NEURO:  Alert, oriented X 3, Gait not examined Non-focal. Reflexes normal and symmetric. Sensation grossly intact., Cranial nerves II-XII intact  PULSES:  2+ radial, 2+ carotid        DATA:   Diagnostic tests reviewed for today's visit:    Results for orders placed or performed during the hospital encounter of 03/08/24 (from the past 96 hour(s))   Electrocardiogram, 12-lead PRN ACS symptoms   Result Value Ref Range    Ventricular Rate 138 BPM    Atrial Rate 138 BPM    QRS Duration 78 ms    QT Interval 272 ms    QTC Calculation(Bazett) 412 ms    R Axis -24 degrees    T Axis -64 degrees    QRS Count 23 beats    Q Onset 213 ms    T Offset 349 ms    QTC Fredericia 359 ms   CBC   Result Value Ref Range    WBC 7.7 4.4 - 11.3 x10*3/uL    nRBC 0.0 0.0 - 0.0 /100 WBCs    RBC 3.20 (L) 4.50 - 5.90 x10*6/uL    Hemoglobin 10.9 (L) 13.5 - 17.5 g/dL    Hematocrit 31.5 (L) 41.0 - 52.0 %    MCV 98 80 - 100 fL    MCH 34.1 (H) 26.0 - 34.0 pg    MCHC 34.6 32.0 - 36.0 g/dL    RDW 12.3 11.5 - 14.5 %    Platelets 74 (L) 150 - 450 x10*3/uL   Basic Metabolic Panel   Result Value Ref Range    Glucose 105 (H) 74 - 99 mg/dL    Sodium 136 136 - 145 mmol/L    Potassium 4.0 3.5 - 5.3 mmol/L    Chloride 106 98 - 107 mmol/L    Bicarbonate 23 21 - 32 mmol/L    Anion Gap 11 10 - 20 mmol/L    Urea Nitrogen 19 6 - 23 mg/dL    Creatinine 0.85 0.50 - 1.30 mg/dL    eGFR 88 >60 mL/min/1.73m*2    Calcium 7.1 (L) 8.6 - 10.3 mg/dL   Protime-INR   Result Value Ref Range    Protime 13.9 (H) 9.8 - 12.8 seconds    INR 1.2 (H) 0.9 - 1.1   CBC   Result Value Ref Range    WBC 7.7 4.4 - 11.3 x10*3/uL    nRBC 0.0 0.0 - 0.0 /100 WBCs    RBC 3.11 (L) 4.50 - 5.90 x10*6/uL    Hemoglobin 10.6 (L) 13.5 - 17.5 g/dL    Hematocrit  31.0 (L) 41.0 - 52.0 %     80 - 100 fL    MCH 34.1 (H) 26.0 - 34.0 pg    MCHC 34.2 32.0 - 36.0 g/dL    RDW 12.3 11.5 - 14.5 %    Platelets 97 (L) 150 - 450 x10*3/uL   Basic Metabolic Panel   Result Value Ref Range    Glucose 98 74 - 99 mg/dL    Sodium 134 (L) 136 - 145 mmol/L    Potassium 3.9 3.5 - 5.3 mmol/L    Chloride 103 98 - 107 mmol/L    Bicarbonate 24 21 - 32 mmol/L    Anion Gap 11 10 - 20 mmol/L    Urea Nitrogen 17 6 - 23 mg/dL    Creatinine 0.77 0.50 - 1.30 mg/dL    eGFR >90 >60 mL/min/1.73m*2    Calcium 7.1 (L) 8.6 - 10.3 mg/dL   Protime-INR   Result Value Ref Range    Protime 14.9 (H) 9.8 - 12.8 seconds    INR 1.3 (H) 0.9 - 1.1   VERIFY ABO/Rh Group Test   Result Value Ref Range    ABO TYPE A     Rh TYPE POS    Prepare RBC: 2 Units   Result Value Ref Range    PRODUCT CODE B0741V95     Unit Number [id number]     Unit ABO A     Unit RH POS     XM INTEP COMP     Dispense Status TR     Blood Expiration Date April 05, 2024 23:59 EDT     PRODUCT BLOOD TYPE 6200     UNIT VOLUME 350     PRODUCT CODE B4369Q20     Unit Number [id number]     Unit ABO A     Unit RH POS     XM INTEP COMP     Dispense Status XM     Blood Expiration Date April 05, 2024 23:59 EDT     PRODUCT BLOOD TYPE 6200     UNIT VOLUME 350    Type and screen   Result Value Ref Range    ABO TYPE A     Rh TYPE POS     ANTIBODY SCREEN NEG    Fungal Culture/Smear    Specimen: ABSCESS; Swab   Result Value Ref Range    Fungal Culture/Smear       Culture in progress, a report will be issued when positive or after 2 weeks of incubation.    Fungal Smear No fungal elements seen    Tissue/Wound Culture/Smear    Specimen: ABSCESS; Swab   Result Value Ref Range    Tissue/Wound Culture/Smear No growth to date     Gram Stain (4+) Abundant Polymorphonuclear leukocytes (A)     Gram Stain (4+) Abundant Mixed Gram positive bacteria (A)    Tissue/Wound Culture/Smear    Specimen: HIP ARTHROPLASTY LEFT; Swab   Result Value Ref Range    Gram Stain No  polymorphonuclear leukocytes seen     Gram Stain No organisms seen    Tissue/Wound Culture/Smear    Specimen: HIP ARTHROPLASTY LEFT; Swab   Result Value Ref Range    Gram Stain No polymorphonuclear leukocytes seen     Gram Stain No organisms seen    Tissue/Wound Culture/Smear    Specimen: HIP ARTHROPLASTY LEFT; Swab   Result Value Ref Range    Gram Stain No polymorphonuclear leukocytes seen     Gram Stain No organisms seen    Tissue/Wound Culture/Smear    Specimen: HIP ARTHROPLASTY LEFT; Swab   Result Value Ref Range    Gram Stain No polymorphonuclear leukocytes seen     Gram Stain No organisms seen    Tissue/Wound Culture/Smear    Specimen: HIP ARTHROPLASTY LEFT; Swab   Result Value Ref Range    Gram Stain No polymorphonuclear leukocytes seen     Gram Stain No organisms seen    CBC   Result Value Ref Range    WBC 8.3 4.4 - 11.3 x10*3/uL    nRBC 0.0 0.0 - 0.0 /100 WBCs    RBC 2.39 (L) 4.50 - 5.90 x10*6/uL    Hemoglobin 8.2 (L) 13.5 - 17.5 g/dL    Hematocrit 24.3 (L) 41.0 - 52.0 %     (H) 80 - 100 fL    MCH 34.3 (H) 26.0 - 34.0 pg    MCHC 33.7 32.0 - 36.0 g/dL    RDW 12.4 11.5 - 14.5 %    Platelets 89 (L) 150 - 450 x10*3/uL   CBC   Result Value Ref Range    WBC 10.5 4.4 - 11.3 x10*3/uL    nRBC 0.0 0.0 - 0.0 /100 WBCs    RBC 2.83 (L) 4.50 - 5.90 x10*6/uL    Hemoglobin 9.2 (L) 13.5 - 17.5 g/dL    Hematocrit 27.7 (L) 41.0 - 52.0 %    MCV 98 80 - 100 fL    MCH 32.5 26.0 - 34.0 pg    MCHC 33.2 32.0 - 36.0 g/dL    RDW 14.2 11.5 - 14.5 %    Platelets 97 (L) 150 - 450 x10*3/uL   Basic Metabolic Panel   Result Value Ref Range    Glucose 201 (H) 74 - 99 mg/dL    Sodium 134 (L) 136 - 145 mmol/L    Potassium 4.3 3.5 - 5.3 mmol/L    Chloride 103 98 - 107 mmol/L    Bicarbonate 23 21 - 32 mmol/L    Anion Gap 12 10 - 20 mmol/L    Urea Nitrogen 21 6 - 23 mg/dL    Creatinine 0.86 0.50 - 1.30 mg/dL    eGFR 88 >60 mL/min/1.73m*2    Calcium 7.0 (L) 8.6 - 10.3 mg/dL   Protime-INR   Result Value Ref Range    Protime 18.2 (H) 9.8 -  12.8 seconds    INR 1.6 (H) 0.9 - 1.1   Protime-INR   Result Value Ref Range    Protime 20.4 (H) 9.8 - 12.8 seconds    INR 1.8 (H) 0.9 - 1.1      XR hip left with pelvis when performed 2 or 3 views    Result Date: 3/12/2024  Interpreted By:  Ryan Dorman, STUDY: XR HIP LEFT WITH PELVIS WHEN PERFORMED 2 OR 3 VIEWS; XR FEMUR LEFT 2+ VIEWS;  3/11/2024 10:29 pm   INDICATION: Signs/Symptoms:Postop; Signs/Symptoms:postop.   COMPARISON: Previous exam from 03/08/2024.  .   ACCESSION NUMBER(S): [id number]; [id number]   ORDERING CLINICIAN: CHANTELL CARSON   TECHNIQUE: AP view pelvis, two views left hip, and four views left femur were obtained.   FINDINGS: Patient had a previous total left hip arthroplasty with subsequent acute proximal femoral fracture. Patient is now status post revision placement of a new long femoral component with multiple proximal femoral cerclage wires. There is anatomic alignment of the fracture fragments. Femoral and acetabular components are well seated and in good alignment. There is mild postsurgical air in the soft tissues and there is a surgical drain in place. There is no additional femoral fracture. No destructive bone lesion.       Interval revision of previous left hip arthroplasty with periprosthetic fracture. There is a new long-stem femoral component in place with multiple proximal femoral cerclage wires and anatomic alignment of the previous fracture fragments.   MACRO: None   Signed by: Ryan Dorman 3/12/2024 8:13 AM Dictation workstation:   [id number]    XR femur left 2+ views    Result Date: 3/12/2024  Interpreted By:  Ryan Dorman, STUDY: XR HIP LEFT WITH PELVIS WHEN PERFORMED 2 OR 3 VIEWS; XR FEMUR LEFT 2+ VIEWS;  3/11/2024 10:29 pm   INDICATION: Signs/Symptoms:Postop; Signs/Symptoms:postop.   COMPARISON: Previous exam from 03/08/2024.  .   ACCESSION NUMBER(S): [id number]; [id number]   ORDERING CLINICIAN: CHANTELL CARSON   TECHNIQUE: AP view pelvis, two views left hip, and four  views left femur were obtained.   FINDINGS: Patient had a previous total left hip arthroplasty with subsequent acute proximal femoral fracture. Patient is now status post revision placement of a new long femoral component with multiple proximal femoral cerclage wires. There is anatomic alignment of the fracture fragments. Femoral and acetabular components are well seated and in good alignment. There is mild postsurgical air in the soft tissues and there is a surgical drain in place. There is no additional femoral fracture. No destructive bone lesion.       Interval revision of previous left hip arthroplasty with periprosthetic fracture. There is a new long-stem femoral component in place with multiple proximal femoral cerclage wires and anatomic alignment of the previous fracture fragments.   MACRO: None   Signed by: Ryan Dorman 3/12/2024 8:13 AM Dictation workstation:   [id number]    Electrocardiogram, 12-lead PRN ACS symptoms    Result Date: 3/12/2024  Atrial fibrillation with rapid ventricular response with premature ventricular or aberrantly conducted complexes Low voltage QRS ST & T wave abnormality, consider anterior ischemia or digitalis effect Abnormal ECG When compared with ECG of 09-MAR-2024 18:38, No significant change was found Confirmed by Kenney Campbell ([id number]) on 3/12/2024 8:06:06 AM    [place] less than 1 hour    Result Date: 3/11/2024  These images are not reportable by radiology and will not be interpreted by  Radiologists.    ECG 12 lead    Result Date: 3/11/2024  Atrial fibrillation with rapid ventricular response Nonspecific ST and T wave abnormality , probably digitalis effect Abnormal ECG When compared with ECG of 09-MAR-2024 18:39, (unconfirmed) No significant change was found    XR pelvis 1-2 views    Result Date: 3/8/2024  Interpreted By:  Fausto Alan, STUDY: XR PELVIS 1-2 VIEWS; ;  3/8/2024 3:24 pm   INDICATION: Signs/Symptoms:As low/inferior as possible while still including superior end  of implant. Goal is to include as much of bilateral femurs (please ensure proper rotation of right femur) as possible for preoperative planning. Please Epic chat me with questions. Thank you..   COMPARISON: 04/26/2023 left hip radiograph   ACCESSION NUMBER(S): [id number]   ORDERING CLINICIAN: CHANTELL CARSON   FINDINGS: The proximal left femoral shaft has an acute oblique fracture extending from the greater trochanter inferiorly and medially exiting the medial cortex 3 cm superior to the distal end of the fitted femoral prosthetic stem.   The lateral fragment is distracted laterally and displaced proximally by about 1 cm with minimal lateral angulation.   No dislocation of the left prosthesis is noted.   No fracture or dislocation of the right hip is noted.       Acute fracture of the proximal left femoral shaft.     MACRO: None   Signed by: Fausto Alan 3/8/2024 3:27 PM Dictation workstation:   [id number]    ECG 12 lead    Result Date: 3/8/2024  Atrial fibrillation Abnormal ECG When compared with ECG of 21-OCT-2022 11:11, No significant change was found    CT pelvis wo IV contrast    Result Date: 3/8/2024  Interpreted By:  Desiree Lewis, STUDY: CT PELVIS WO IV CONTRAST; ;  3/8/2024 1:24 am   INDICATION: Signs/Symptoms:orthopedic fractures.   COMPARISON: Correlation with CT left hip and CT femur 03/07/2024   ACCESSION NUMBER(S): [id number]   ORDERING CLINICIAN: TYLER ZAMORANO   TECHNIQUE: Noncontrast CT images of the pelvis with axial, sagittal and coronal reconstructed images.   FINDINGS: Acute nondisplaced fractures of the left superior and inferior pubic rami are again noted, no significant change in alignment. There is also an acute nondisplaced fracture of the left sacral ala, not included on prior imaging. No sacroiliac or pubic symphysis diastasis.   Left total hip arthroplasty and the known left femur periprosthetic fracture are partially imaged. Mild to moderate right hip osteoarthrosis noted. Moderate to  severe degenerative disc changes at L3-4, L4-5 and L5-S1.   Mild edema/hematoma adjacent to the left inferior pubic ramus. Visualized intrapelvic structures are unremarkable.       Acute nondisplaced left superior and inferior pubic rami fractures. Acute nondisplaced left sacral ala fracture. No sacroiliac or pubic symphysis diastasis.   Partially imaged known left periprosthetic femur fracture.     MACRO: None   Signed by: Desiree Lewis 3/8/2024 1:46 AM Dictation workstation:   [id number]    CT hip left wo IV contrast    Result Date: 3/7/2024  Interpreted By:  Desiree Lewis, STUDY: CT HIP LEFT WO IV CONTRAST; CT FEMUR LEFT WO IV CONTRAST; ;  3/7/2024 5:55 pm   INDICATION: Signs/Symptoms:eval L femur fracture; Signs/Symptoms:eval femur fracture.   COMPARISON: Radiographs of the same day   ACCESSION NUMBER(S): [id number]; [id number]   ORDERING CLINICIAN: BELEN PEREZ   TECHNIQUE: Noncontrast CT images of the left hip and left femur with axial, sagittal and coronal reconstructed images. Metal artifact reduction technique was used.   FINDINGS: Left total hip arthroplasty is again noted. Acute periprosthetic femur fracture is again noted. The proximal extent of the fracture is just distal to the greater trochanter, the fracture line extends to the medial femoral diaphysis, 2-3 cm proximal to the femoral stem tip. There is mild comminution, with a not significantly displaced posterior butterfly fragment. There is 2 cm medial to lateral and anterior to posterior displacement of the dominant fracture fragments. The knee is externally rotated.   There are acute appearing nondisplaced left superior and inferior pubic rami fractures.   Subchondral cyst noted in the left acetabulum.   Mild tricompartmental osteoarthrosis of the knee.   There is edema/hematoma in the proximal to mid anterior compartment of the thigh. Mild hematoma around the left inferior pubic ramus. Visualized intrapelvic structures are unremarkable.        Acute, displaced left periprosthetic proximal femur fracture.   Acute, nondisplaced left pubic rami fractures.   Edema/hematoma in the anterior compartment of the thigh and adjacent to the inferior pubic ramus.     MACRO: None   Signed by: Desiree Lewis 3/7/2024 7:06 PM Dictation workstation:   [id number]    CT femur left wo IV contrast    Result Date: 3/7/2024  Interpreted By:  Desiree Lewis, STUDY: CT HIP LEFT WO IV CONTRAST; CT FEMUR LEFT WO IV CONTRAST; ;  3/7/2024 5:55 pm   INDICATION: Signs/Symptoms:eval L femur fracture; Signs/Symptoms:eval femur fracture.   COMPARISON: Radiographs of the same day   ACCESSION NUMBER(S): [id number]; [id number]   ORDERING CLINICIAN: BELEN PEREZ   TECHNIQUE: Noncontrast CT images of the left hip and left femur with axial, sagittal and coronal reconstructed images. Metal artifact reduction technique was used.   FINDINGS: Left total hip arthroplasty is again noted. Acute periprosthetic femur fracture is again noted. The proximal extent of the fracture is just distal to the greater trochanter, the fracture line extends to the medial femoral diaphysis, 2-3 cm proximal to the femoral stem tip. There is mild comminution, with a not significantly displaced posterior butterfly fragment. There is 2 cm medial to lateral and anterior to posterior displacement of the dominant fracture fragments. The knee is externally rotated.   There are acute appearing nondisplaced left superior and inferior pubic rami fractures.   Subchondral cyst noted in the left acetabulum.   Mild tricompartmental osteoarthrosis of the knee.   There is edema/hematoma in the proximal to mid anterior compartment of the thigh. Mild hematoma around the left inferior pubic ramus. Visualized intrapelvic structures are unremarkable.       Acute, displaced left periprosthetic proximal femur fracture.   Acute, nondisplaced left pubic rami fractures.   Edema/hematoma in the anterior compartment of the thigh and  adjacent to the inferior pubic ramus.     MACRO: None   Signed by: Desiree Lewis 3/7/2024 7:06 PM Dictation workstation:   [id number]    XR shoulder left 2+ views    Result Date: 3/7/2024  Interpreted By:  Dave Hatfield, STUDY: XR SHOULDER LEFT 2+ VIEWS; ;  3/7/2024 2:11 pm   INDICATION: Signs/Symptoms:trauma.   COMPARISON: None.   ACCESSION NUMBER(S): [id number]   ORDERING CLINICIAN: PREETHI RICHARD   FINDINGS: No acute fracture or glenohumeral dislocation.No acromioclavicular seperation.Mild acromioclavicular degenerative changes.       No acute osseous abnormality left shoulder.     MACRO: None   Signed by: Dave Hatfield 3/7/2024 2:22 PM Dictation workstation:   [id number]    XR femur left 2+ views    Result Date: 3/7/2024  Interpreted By:  Dave Hatfield, STUDY: XR PELVIS 1-2 VIEWS; XR FEMUR LEFT 2+ VIEWS; ;  3/7/2024 2:11 pm   INDICATION: Signs/Symptoms:trauma.   COMPARISON: 04/26/2023   ACCESSION NUMBER(S): [id number]; [id number]   ORDERING CLINICIAN: BELEN RICHARD   FINDINGS: Frontal view the pelvis and four views of the left femur. There is an obliquely oriented, displaced periprosthetic fracture traversing region of patient's femoral stem component of left hip arthroplasty. No dislocation. Imaged distal femur appears intact.       Acute, obliquely oriented periprosthetic fracture of the left proximal femur across region of femoral stem component of left hip arthroplasty.     MACRO: None   Signed by: Dave Hatfield 3/7/2024 2:21 PM Dictation workstation:   [id number]    XR pelvis 1-2 views    Result Date: 3/7/2024  Interpreted By:  Dave Hatfield, STUDY: XR PELVIS 1-2 VIEWS; XR FEMUR LEFT 2+ VIEWS; ;  3/7/2024 2:11 pm   INDICATION: Signs/Symptoms:trauma.   COMPARISON: 04/26/2023   ACCESSION NUMBER(S): [id number]; [id number]   ORDERING CLINICIAN: BELEN RICHARD   FINDINGS: Frontal view the pelvis and four views of the left femur. There is an obliquely oriented,  displaced periprosthetic fracture traversing region of patient's femoral stem component of left hip arthroplasty. No dislocation. Imaged distal femur appears intact.       Acute, obliquely oriented periprosthetic fracture of the left proximal femur across region of femoral stem component of left hip arthroplasty.     MACRO: None   Signed by: Dave Hatfield 3/7/2024 2:21 PM Dictation workstation:   [id number]            SIGNATURE:  Kris Kuhn MD  DATE:  March 13, 2024  TIME:  8:21 AM

## 2024-03-13 NOTE — PROGRESS NOTES
03/13/24 0936   Discharge Planning   Home or Post Acute Services Post acute facilities (Rehab/SNF/etc)   Type of Post Acute Facility Services Skilled nursing   Patient expects to be discharged to: [facility]/needs pre-cert     Met with patient to obtain FOC for SNF.  He stated [facility] is his FOC.  Updated SNF via careport.  Will request auth be started by [facility] team.  Millie Levy RN    Transitional Care Coordinator Note @ 1247  Precert Status: Approved  [facility] Auth ID # [id number]  Dates: 03/13/2024 - 03/15/2024  Millie Levy RN     Transitional Care Coordinator Note @ 1340  [facility] can accept patient today around 1700.  Will request transportation once [facility] completed.  Nurse to call report to [phone]  Message left on voicemail for patient's wife, Kate.  Millie Levy RN

## 2024-03-13 NOTE — PROGRESS NOTES
Occupational Therapy    OT Treatment    Patient Name: José Barth  MRN: [id number]  Today's Date: 3/13/2024  Time Calculation  Start Time: 1025  Stop Time: 1050  Time Calculation (min): 25 min         Assessment:  OT Assessment: steady progress, pt tolerated pain better than initial evaluation however still requiring assist x2  Prognosis: Fair  Barriers to Discharge: Decreased caregiver support, Inaccessible home environment  Evaluation/Treatment Tolerance: Patient limited by pain  Medical Staff Made Aware: Yes  End of Session Communication: Bedside nurse  End of Session Patient Position: Up in chair, Alarm on  OT Assessment Results: Decreased ADL status, Decreased safe judgment during ADL, Decreased endurance, Decreased functional mobility, Decreased trunk control for functional activities  Prognosis: Fair  Barriers to Discharge: Decreased caregiver support, Inaccessible home environment  Evaluation/Treatment Tolerance: Patient limited by pain  Medical Staff Made Aware: Yes  Strengths: Ability to acquire knowledge, Attitude of self  Plan:  Treatment Interventions: ADL retraining, Functional transfer training, UE strengthening/ROM, Endurance training, Equipment evaluation/education, Compensatory technique education  OT Frequency: 4 times per week  OT Discharge Recommendations: Moderate intensity level of continued care  Equipment Recommended upon Discharge: Wheeled walker, Bedside commode  OT Recommended Transfer Status: Assist of 2, Maximum assist  OT - OK to Discharge: Yes  Treatment Interventions: ADL retraining, Functional transfer training, UE strengthening/ROM, Endurance training, Equipment evaluation/education, Compensatory technique education    Subjective   Previous Visit Info:  OT Last Visit  OT Received On: 03/13/24  General:  General  Reason for Referral: POD 2 of Left Hip Total Arthroplasty Revision after a fall resulting in a left hip periprosthetic fracture.  Past Medical History Relevant to  Rehab: history of hypertension, hyperlipidemia and chronic atrial fibrillation  Family/Caregiver Present: No  Caregiver Feedback: wife and sister present  Co-Treatment: PT  Co-Treatment Reason: to maximize participation and safety  Prior to Session Communication: Bedside nurse  Patient Position Received: Bed, 3 rail up, Alarm on  Preferred Learning Style: kinesthetic, auditory  General Comment: pt agreeable with therapy and with better pain tolerance on this date; pt also very talkative  Precautions:  LE Weight Bearing Status: Left Non-Weight Bearing  Medical Precautions: Fall precautions  Post-Surgical Precautions: Left hip precautions  Braces Applied: abduction brace donned in bed     Pain:  Pain Assessment  Pain Assessment: 0-10  Pain Score: 6  Pain Type: Surgical pain  Pain Location: Hip  Pain Orientation: Left    Objective    Cognition:  Cognition  Overall Cognitive Status: Impaired  Orientation Level: Disoriented to time  Safety Judgment: Decreased awareness of need for safety precautions  Safety/Judgement: Exceptions to WFL  Insight: Moderate  Impulsive: Mildly  Coordination:  Movements are Fluid and Coordinated: Yes  Activities of Daily Living: LE Dressing  LE Dressing: Yes  Sock Level of Assistance: Maximum assistance  LE Dressing Where Assessed: Edge of bed  LE Dressing Comments: due to hip precautions and decreased sitting balance     Bed Mobility/Transfers: Bed Mobility 1  Bed Mobility 1: Supine to sitting  Level of Assistance 1: Maximum assistance (x2)  Bed Mobility Comments 1: assist x2 for managing BLE and lifting trunk, extended time to complete and cues for proper technique.  Bed Mobility 2  Bed Mobility  2: Sitting to supine  Level of Assistance 2: Maximum assistance  Bed Mobility Comments 2: assist for lifting BLE back into bed and for lowering/positioning trunk.  cues for technique throughout    Transfers  Transfer: Yes  Transfer 1  Technique 1: Sit to stand, Stand to sit  Transfer Device 1:  Walker  Transfer Level of Assistance 1: Maximum assistance, +2  Trials/Comments 1: from edge of bed with cues for positioning and hand placement.  pt with significant difficulty maintaining standing position while adhering to NWB LLE.  pt with heavy reliance on BUE for support.  Transfers 2  Transfer From 2: Bed to  Transfer to 2: Chair with arms  Technique 2: Squat pivot  Transfer Level of Assistance 2: Maximum assistance, +2  Trials/Comments 2: low pivot to recliner chair; MAX A x2 with cues for positioning and hand placement.    Sitting Balance:  Static Sitting Balance  Static Sitting-Balance Support: Feet supported, Bilateral upper extremity supported  Static Sitting-Level of Assistance: Contact guard  Static Sitting-Comment/Number of Minutes: pt with significant guarding and heavily leaning towards R side to reduce pain in left hip.  required cues for posutral correcitons for better midline alignment  Standing Balance:  Static Standing Balance  Static Standing-Balance Support: Bilateral upper extremity supported  Static Standing-Level of Assistance: Maximum assistance (x2)  Static Standing-Comment/Number of Minutes: <1 minute      Outcome Measures:[facility] Daily Activity  Putting on and taking off regular lower body clothing: Total  Bathing (including washing, rinsing, drying): A lot  Putting on and taking off regular upper body clothing: A lot  Toileting, which includes using toilet, bedpan or urinal: Total  Taking care of personal grooming such as brushing teeth: A lot  Eating Meals: A little  Daily Activity - Total Score: 11        Education Documentation  Body Mechanics, taught by Den Machuca OT at 3/13/2024 11:16 AM.  Learner: Patient  Readiness: Acceptance  Method: Explanation, Demonstration  Response: Verbalizes Understanding, Demonstrated Understanding    Precautions, taught by Den Machuca OT at 3/13/2024 11:16 AM.  Learner: Patient  Readiness: Acceptance  Method: Explanation,  Demonstration  Response: Verbalizes Understanding, Demonstrated Understanding    ADL Training, taught by Den Machuca OT at 3/13/2024 11:16 AM.  Learner: Patient  Readiness: Acceptance  Method: Explanation, Demonstration  Response: Verbalizes Understanding, Demonstrated Understanding    Body Mechanics, taught by Den Machuca OT at 3/12/2024  3:54 PM.  Learner: Patient  Readiness: Acceptance  Method: Explanation, Demonstration  Response: Needs Reinforcement    Precautions, taught by Den Machuca OT at 3/12/2024  3:54 PM.  Learner: Patient  Readiness: Acceptance  Method: Explanation, Demonstration  Response: Needs Reinforcement    ADL Training, taught by Den Machuca OT at 3/12/2024  3:54 PM.  Learner: Patient  Readiness: Acceptance  Method: Explanation, Demonstration  Response: Needs Reinforcement    Education Comments  No comments found.        OP EDUCATION:       Goals:  Encounter Problems       Encounter Problems (Active)       ADLs       Patient with complete upper body dressing with set-up level of assistance donning and doffing all UE clothes while edge of bed  (Progressing)       Start:  03/12/24    Expected End:  03/26/24            Patient with complete lower body dressing with moderate assist level of assistance donning and doffing all LE clothes  with PRN adaptive equipment while edge of bed  (Progressing)       Start:  03/12/24    Expected End:  03/26/24            Patient will complete toileting including hygiene clothing management/hygiene with moderate assist level of assistance and raised toilet seat and grab bars. (Progressing)       Start:  03/12/24    Expected End:  03/26/24               BALANCE       Patient will tolerate standing for 5 minutes to moderate assist level of assistance with least restrictive device in order to improve functional activity tolerance for ADL tasks. (Progressing)       Start:  03/12/24    Expected End:  03/26/24               COGNITION/SAFETY       Patient  will recall and adhere to hip precautions during all functional mobility/ADL tasks in order to demonstrate improved understanding and promote healing post op (Progressing)       Start:  03/12/24    Expected End:  03/26/24               TRANSFERS       Patient will perform bed mobility minimal assist  level of assistance and bed rails in order to improve safety and independence with mobility (Progressing)       Start:  03/12/24    Expected End:  03/26/24            Patient will complete functional transfer to toilet/commode with least restrictive device with moderate assist level of assistance. (Progressing)       Start:  03/12/24    Expected End:  03/26/24

## 2024-03-13 NOTE — PROGRESS NOTES
03/13/24 1627   Discharge Planning   Patient expects to be discharged to: [facility]     Discharge plan: transport scheduled for 6 PM. VM left for wife. Med team and facility aware.

## 2024-03-13 NOTE — PROGRESS NOTES
Physical Therapy    Physical Therapy Treatment    Patient Name: José Barth  MRN: [id number]  Today's Date: 3/13/2024  Time Calculation  Start Time: 1026  Stop Time: 1051  Time Calculation (min): 25 min       Assessment/Plan   PT Assessment  End of Session Communication: Bedside nurse  Assessment Comment: Pt progressed with seated scoot transfer to chair today.  Tolerating sitting in chair.  End of Session Patient Position: Up in chair, Alarm on     PT Plan  Treatment/Interventions: Bed mobility, Transfer training, Balance training, Strengthening, Endurance training, Therapeutic exercise, Therapeutic activity  PT Plan: Skilled PT  PT Frequency: Daily  PT Discharge Recommendations: Moderate intensity level of continued care  Equipment Recommended upon Discharge: Wheeled walker  PT Recommended Transfer Status: Assist x2  PT - OK to Discharge: Yes (per PT POC)      General Visit Information:   PT  Visit  PT Received On: 03/13/24  General  Reason for Referral: POD 1 of Left Hip Total Arthroplasty Revision after a fall resulting in a left hip periprosthetic fracture.  Referred By: Trev (APRN)  Past Medical History Relevant to Rehab: history of hypertension, hyperlipidemia and chronic atrial fibrillation  Co-Treatment: OT  Co-Treatment Reason: to assure safety of pt with transfers and to encourage max particpation due to pt's fear of increasing pain with movement.  Prior to Session Communication: Bedside nurse  Patient Position Received: Bed, 3 rail up, Alarm on  General Comment: Pt pleasant and participatory.   Fearful of increasing pain with movement. (sitting with right lean to reduce pressure and pain on L hip.)    Subjective   Precautions:  Precautions  LE Weight Bearing Status: Left Non-Weight Bearing  Medical Precautions: Fall precautions  Post-Surgical Precautions: Left hip precautions    Objective   Pain:  Pain Assessment  Pain Assessment: 0-10  Pain Score: 6  Pain Type: Surgical pain  Pain Location:  Hip  Pain Orientation: Left  Cognition:  Cognition  Overall Cognitive Status: Impaired  Postural Control:  Static Sitting Balance  Static Sitting-Balance Support: Bilateral upper extremity supported, Feet supported  Static Sitting-Level of Assistance: Contact guard  Static Sitting-Comment/Number of Minutes: At EOB, pt leaning to right side to relieve pressure and pain on L hip.  Static Standing Balance  Static Standing-Balance Support: Bilateral upper extremity supported (NWB L LE. Pt's foot resting on PTA's foot to avoid WB.)  Static Standing-Level of Assistance: Maximum assistance (+ 2)  Static Standing-Comment/Number of Minutes: Unable to complete full stand. Not able to extend R knee fully to support  body weight.    Activity Tolerance:  Activity Tolerance  Endurance: Tolerates 10 - 20 min exercise with multiple rests  Treatments:  Therapeutic Exercise  Therapeutic Exercise Performed: Yes  Therapeutic Exercise Activity 1: pt instructed in AP, HS.  Pt reluctant to perform due to fear of increasing pain.  Initial Min A needed  then no A to finitsh set of 10. LAQ Max assist needed and limited ROM.  L LE only.       Bed Mobility  Bed Mobility: Yes  Bed Mobility 1  Bed Mobility 1: Supine to sitting  Level of Assistance 1: Maximum assistance, Maximum verbal cues (+2)  Bed Mobility Comments 1: Increase time and effort to complete. Mod cues for tech and precautions    Transfers  Transfer: Yes  Transfer 1  Transfer From 1: Bed to  Transfer to 1: Chair with drop arm  Technique 1: Lateral (seated scoot to R side.)  Transfer Level of Assistance 1: Maximum assistance, +2, Moderate verbal cues, Moderate tactile cues  Trials/Comments 1: GAit belt, cues for hand hip and leg placement, technique.    Outcome Measures:  [facility] Basic Mobility  Turning from your back to your side while in a flat bed without using bedrails: A lot  Moving from lying on your back to sitting on the side of a flat bed without using bedrails: A lot  Moving  to and from bed to chair (including a wheelchair): A lot  Standing up from a chair using your arms (e.g. wheelchair or bedside chair): Total  To walk in hospital room: Total  Climbing 3-5 steps with railing: Total  Basic Mobility - Total Score: 9    Education Documentation  Handouts, taught by Del Logan PTA at 3/13/2024 11:32 AM.  Learner: Patient  Readiness: Acceptance  Method: Explanation, Demonstration  Response: Demonstrated Understanding, Needs Reinforcement    Precautions, taught by Del Logan PTA at 3/13/2024 11:32 AM.  Learner: Patient  Readiness: Acceptance  Method: Explanation, Demonstration  Response: Demonstrated Understanding, Needs Reinforcement    Body Mechanics, taught by Del Logan PTA at 3/13/2024 11:32 AM.  Learner: Patient  Readiness: Acceptance  Method: Explanation, Demonstration  Response: Demonstrated Understanding, Needs Reinforcement    Home Exercise Program, taught by Del Logan PTA at 3/13/2024 11:32 AM.  Learner: Patient  Readiness: Acceptance  Method: Explanation, Demonstration  Response: Demonstrated Understanding, Needs Reinforcement    Mobility Training, taught by Del Logan PTA at 3/13/2024 11:32 AM.  Learner: Patient  Readiness: Acceptance  Method: Explanation, Demonstration  Response: Demonstrated Understanding, Needs Reinforcement    Education Comments  No comments found.        OP EDUCATION:       Encounter Problems       Encounter Problems (Active)       Mobility       STG - Patient will ambulate 10 feet with WB restrictions and min assist x1 (Not Progressing)       Start:  03/12/24    Expected End:  03/26/24            STG - Patient will ascend and descend four to six stairs while holding 2 rails with min assist x1 (Not Progressing)       Start:  03/12/24    Expected End:  03/26/24               PT Transfers       STG - Transfer from bed to chair with min assist x1 and FWW (Progressing)       Start:  03/12/24    Expected End:  03/26/24             STG - Patient to transfer to and from sit to supine with min assist x1 (Progressing)       Start:  03/12/24    Expected End:  03/26/24            STG - Patient will perform bed mobility with min assist x1 (Progressing)       Start:  03/12/24    Expected End:  03/26/24            STG - Patient will transfer sit to and from stand with FWW and min assist x1 (Not Progressing)       Start:  03/12/24    Expected End:  03/26/24

## 2024-03-14 LAB
ATRIAL RATE: 111 BPM
BACTERIA SPEC CULT: NORMAL
BLOOD EXPIRATION DATE: NORMAL
BLOOD EXPIRATION DATE: NORMAL
DISPENSE STATUS: NORMAL
DISPENSE STATUS: NORMAL
GRAM STN SPEC: NORMAL
PRODUCT BLOOD TYPE: 6200
PRODUCT BLOOD TYPE: 6200
PRODUCT CODE: NORMAL
PRODUCT CODE: NORMAL
Q ONSET: 227 MS
QRS COUNT: 20 BEATS
QRS DURATION: 76 MS
QT INTERVAL: 326 MS
QTC CALCULATION(BAZETT): 458 MS
QTC FREDERICIA: 409 MS
R AXIS: -15 DEGREES
T AXIS: -17 DEGREES
T OFFSET: 390 MS
UNIT ABO: NORMAL
UNIT ABO: NORMAL
UNIT NUMBER: NORMAL
UNIT NUMBER: NORMAL
UNIT RH: NORMAL
UNIT RH: NORMAL
UNIT VOLUME: 350
UNIT VOLUME: 350
VENTRICULAR RATE: 119 BPM
XM INTEP: NORMAL
XM INTEP: NORMAL

## 2024-03-14 NOTE — DISCHARGE SUMMARY
ADMISSION DATE: 3/8/2024     DISCHARGE DATE:  03/13/24     Discharge Diagnosis  Closed left hip fracture, initial encounter (CMS/[facility]ly [facility])  Chronic persistent atrial fibrillation on Coumadin.    Issues Requiring Follow-Up  Follow-up with orthopedics in 1 to 2 weeks  Follow-up with PCP and cardiology as a scheduled.        Discharge Meds     Your medication list        START taking these medications        Instructions Last Dose Given Next Dose Due   acetaminophen 325 mg tablet  Commonly known as: Tylenol  Replaces: TylenoL 325 mg capsule      Take 2 tablets (650 mg) by mouth every 6 hours if needed for mild pain (1 - 3).       cefadroxil 500 mg capsule  Commonly known as: Duricef      Take 1 capsule (500 mg) by mouth 2 times a day for 7 days.       nystatin 100,000 unit/gram powder  Commonly known as: Mycostatin      Apply 1 Application topically 3 times a day.       oxyCODONE 5 mg immediate release tablet  Commonly known as: Roxicodone      Take 1 tablet (5 mg) by mouth every 6 hours if needed for severe pain (7 - 10).              CONTINUE taking these medications        Instructions Last Dose Given Next Dose Due   atorvastatin 20 mg tablet  Commonly known as: Lipitor      TAKE 1 TABLET BY MOUTH ONCE  DAILY       metoprolol tartrate 25 mg tablet  Commonly known as: Lopressor      TAKE 1 TABLET BY MOUTH TWICE  DAILY       warfarin 4 mg tablet  Commonly known as: Coumadin      Take as directed. If you are unsure how to take this medication, talk to your nurse or doctor.  Original instructions: Take 1 tablet (4mg) by mouth once daily EXCEPT 0.5 tablet (2mg) by mouth on Wednesdays only or as directed per [facility]              STOP taking these medications      TylenoL 325 mg capsule  Generic drug: acetaminophen  Replaced by: acetaminophen 325 mg tablet                  Where to Get Your Medications        These medications were sent to [facility]  [address], [place] [address]       Hours: 8 AM to 6 PM Mon-Fri, 9 AM to 1 PM Saturday Phone: [phone]   acetaminophen 325 mg tablet       You can get these medications from any pharmacy    Bring a paper prescription for each of these medications  oxyCODONE 5 mg immediate release tablet       Information about where to get these medications is not yet available    Ask your nurse or doctor about these medications  cefadroxil 500 mg capsule  nystatin 100,000 unit/gram powder             Results for orders placed or performed during the hospital encounter of 03/08/24 (from the past 24 hour(s))   CBC   Result Value Ref Range    WBC 11.3 4.4 - 11.3 x10*3/uL    nRBC 0.0 0.0 - 0.0 /100 WBCs    RBC 2.44 (L) 4.50 - 5.90 x10*6/uL    Hemoglobin 8.1 (L) 13.5 - 17.5 g/dL    Hematocrit 21.9 (L) 41.0 - 52.0 %    MCV 90 80 - 100 fL    MCH 33.2 26.0 - 34.0 pg    MCHC 37.0 (H) 32.0 - 36.0 g/dL    RDW 13.5 11.5 - 14.5 %    Platelets 130 (L) 150 - 450 x10*3/uL   Basic Metabolic Panel   Result Value Ref Range    Glucose 123 (H) 74 - 99 mg/dL    Sodium 135 (L) 136 - 145 mmol/L    Potassium 4.4 3.5 - 5.3 mmol/L    Chloride 104 98 - 107 mmol/L    Bicarbonate 27 21 - 32 mmol/L    Anion Gap 8 (L) 10 - 20 mmol/L    Urea Nitrogen 22 6 - 23 mg/dL    Creatinine 0.74 0.50 - 1.30 mg/dL    eGFR >90 >60 mL/min/1.73m*2    Calcium 7.4 (L) 8.6 - 10.3 mg/dL   Protime-INR   Result Value Ref Range    Protime 20.4 (H) 9.8 - 12.8 seconds    INR 1.8 (H) 0.9 - 1.1            Hospital Course   ASSESSMENT:  Status post left hip arthroplasty for periprosthetic fracture.  History of hypertension  Chronic atrial fibrillation with variable rate, was on Coumadin on admission which has been transitioned to Lovenox prior to surgery and will resume Coumadin now.  Hyperlipidemia  Intertrigo in the inguinal area, advised to apply nystatin powder 3 times daily as needed.         PLAN  Patient was not seen on the day of surgery because he was in surgery and did not come back to the floor until after  "midnight.  Patient seen today, he is currently following commands site of surgery otherwise stable.  Remains in atrial fibrillation with controlled ventricular rate on metoprolol resumed.  Reviewed all labs and imaging studies,  Cardiology and infectious disease on the case  Continue current medication and anticoagulation.  Discussed with orthopedics and patient is stable for discharge to skilled nursing facility when bed available.    SUBJECTIVE  CHIEF COMPLAINT:   Admitted after a fall sustaining a left periprosthetic hip fracture.     INTERVAL HISTORY OF PRESENT ILLNESS: Patient seen and examined, status post left hip periprosthetic fracture arthroplasty on March 11, 2024 on postoperatively coming along fine.  Remains in atrial fibrillation, rate is controlled, metoprolol resumed and Coumadin resumed and INR is 1.8 today.  Advised to discontinue Lovenox.  Stable for discharge to skilled nursing facility today.                    Pertinent Physical Exam At Time of Discharge    OBJECTIVE  Visit Vitals  /62 (BP Location: Left arm)   Pulse 108   Temp 36.3 °C (97.3 °F)   Resp 18   Ht 1.753 m (5' 9.02\")   Wt 72.6 kg (160 lb)   SpO2 95%   BMI 23.62 kg/m²   Smoking Status Never   BSA 1.88 m²      PHYSICAL EXAM:   GENERAL:  Alert, no  distress, cooperative  SKIN:  Skin color, texture, turgor normal. No rashes or lesions.  OROPHARYNX:  Lips, mucosa, and tongue are normal.Teeth and gums, normal. Oropharynx normal.  NECK:  No jugulovenous distention, No carotid bruits, Carotid pulse normal contour, Supple  LUNGS:  Lungs clear to auscultation. Good diaphragmatic excursion.  CARDIAC: Remains in atrial fibrillation with variable ventricular rate ,normal S1 and S2; no rubs,  or gallops, 2/6 systolic ejection murmur left sternal border, no CHF.  ABDOMEN:  Abdomen soft, non-tender, BS normal, No masses or organomegaly  Musculoskeletal, status post left hip arthroplasty for periprosthetic fracture on March 11, " 2024.  EXTREMETIES:  Extremities normal, no deformities, edema, clubbing or skin discoloration. Good capillary refill., No ulcers  NEURO:  Alert, oriented X 3, Gait not examined Non-focal. Reflexes normal and symmetric. Sensation grossly intact., Cranial nerves II-XII intact  PULSES:  2+ radial, 2+ carotid           DATA:   Diagnostic tests reviewed for today's visit:           Results for orders placed or performed during the hospital encounter of 03/08/24 (from the past 96 hour(s))   Electrocardiogram, 12-lead PRN ACS symptoms   Result Value Ref Range     Ventricular Rate 138 BPM     Atrial Rate 138 BPM     QRS Duration 78 ms     QT Interval 272 ms     QTC Calculation(Bazett) 412 ms     R Axis -24 degrees     T Axis -64 degrees     QRS Count 23 beats     Q Onset 213 ms     T Offset 349 ms     QTC Fredericia 359 ms   CBC   Result Value Ref Range     WBC 7.7 4.4 - 11.3 x10*3/uL     nRBC 0.0 0.0 - 0.0 /100 WBCs     RBC 3.20 (L) 4.50 - 5.90 x10*6/uL     Hemoglobin 10.9 (L) 13.5 - 17.5 g/dL     Hematocrit 31.5 (L) 41.0 - 52.0 %     MCV 98 80 - 100 fL     MCH 34.1 (H) 26.0 - 34.0 pg     MCHC 34.6 32.0 - 36.0 g/dL     RDW 12.3 11.5 - 14.5 %     Platelets 74 (L) 150 - 450 x10*3/uL   Basic Metabolic Panel   Result Value Ref Range     Glucose 105 (H) 74 - 99 mg/dL     Sodium 136 136 - 145 mmol/L     Potassium 4.0 3.5 - 5.3 mmol/L     Chloride 106 98 - 107 mmol/L     Bicarbonate 23 21 - 32 mmol/L     Anion Gap 11 10 - 20 mmol/L     Urea Nitrogen 19 6 - 23 mg/dL     Creatinine 0.85 0.50 - 1.30 mg/dL     eGFR 88 >60 mL/min/1.73m*2     Calcium 7.1 (L) 8.6 - 10.3 mg/dL   Protime-INR   Result Value Ref Range     Protime 13.9 (H) 9.8 - 12.8 seconds     INR 1.2 (H) 0.9 - 1.1   CBC   Result Value Ref Range     WBC 7.7 4.4 - 11.3 x10*3/uL     nRBC 0.0 0.0 - 0.0 /100 WBCs     RBC 3.11 (L) 4.50 - 5.90 x10*6/uL     Hemoglobin 10.6 (L) 13.5 - 17.5 g/dL     Hematocrit 31.0 (L) 41.0 - 52.0 %      80 - 100 fL     MCH 34.1 (H) 26.0 -  34.0 pg     MCHC 34.2 32.0 - 36.0 g/dL     RDW 12.3 11.5 - 14.5 %     Platelets 97 (L) 150 - 450 x10*3/uL   Basic Metabolic Panel   Result Value Ref Range     Glucose 98 74 - 99 mg/dL     Sodium 134 (L) 136 - 145 mmol/L     Potassium 3.9 3.5 - 5.3 mmol/L     Chloride 103 98 - 107 mmol/L     Bicarbonate 24 21 - 32 mmol/L     Anion Gap 11 10 - 20 mmol/L     Urea Nitrogen 17 6 - 23 mg/dL     Creatinine 0.77 0.50 - 1.30 mg/dL     eGFR >90 >60 mL/min/1.73m*2     Calcium 7.1 (L) 8.6 - 10.3 mg/dL   Protime-INR   Result Value Ref Range     Protime 14.9 (H) 9.8 - 12.8 seconds     INR 1.3 (H) 0.9 - 1.1   VERIFY ABO/Rh Group Test   Result Value Ref Range     ABO TYPE A       Rh TYPE POS     Prepare RBC: 2 Units   Result Value Ref Range     PRODUCT CODE J6859J35       Unit Number [id number]       Unit ABO A       Unit RH POS       XM INTEP COMP       Dispense Status TR       Blood Expiration Date April 05, 2024 23:59 EDT       PRODUCT BLOOD TYPE 6200       UNIT VOLUME 350       PRODUCT CODE V9972Y21       Unit Number [id number]       Unit ABO A       Unit RH POS       XM INTEP COMP       Dispense Status XM       Blood Expiration Date April 05, 2024 23:59 EDT       PRODUCT BLOOD TYPE 6200       UNIT VOLUME 350     Type and screen   Result Value Ref Range     ABO TYPE A       Rh TYPE POS       ANTIBODY SCREEN NEG     Fungal Culture/Smear     Specimen: ABSCESS; Swab   Result Value Ref Range     Fungal Culture/Smear           Culture in progress, a report will be issued when positive or after 2 weeks of incubation.     Fungal Smear No fungal elements seen     Tissue/Wound Culture/Smear     Specimen: ABSCESS; Swab   Result Value Ref Range     Tissue/Wound Culture/Smear No growth to date       Gram Stain (4+) Abundant Polymorphonuclear leukocytes (A)       Gram Stain (4+) Abundant Mixed Gram positive bacteria (A)     Tissue/Wound Culture/Smear     Specimen: HIP ARTHROPLASTY LEFT; Swab   Result Value Ref Range     Gram Stain  No polymorphonuclear leukocytes seen       Gram Stain No organisms seen     Tissue/Wound Culture/Smear     Specimen: HIP ARTHROPLASTY LEFT; Swab   Result Value Ref Range     Gram Stain No polymorphonuclear leukocytes seen       Gram Stain No organisms seen     Tissue/Wound Culture/Smear     Specimen: HIP ARTHROPLASTY LEFT; Swab   Result Value Ref Range     Gram Stain No polymorphonuclear leukocytes seen       Gram Stain No organisms seen     Tissue/Wound Culture/Smear     Specimen: HIP ARTHROPLASTY LEFT; Swab   Result Value Ref Range     Gram Stain No polymorphonuclear leukocytes seen       Gram Stain No organisms seen     Tissue/Wound Culture/Smear     Specimen: HIP ARTHROPLASTY LEFT; Swab   Result Value Ref Range     Gram Stain No polymorphonuclear leukocytes seen       Gram Stain No organisms seen     CBC   Result Value Ref Range     WBC 8.3 4.4 - 11.3 x10*3/uL     nRBC 0.0 0.0 - 0.0 /100 WBCs     RBC 2.39 (L) 4.50 - 5.90 x10*6/uL     Hemoglobin 8.2 (L) 13.5 - 17.5 g/dL     Hematocrit 24.3 (L) 41.0 - 52.0 %      (H) 80 - 100 fL     MCH 34.3 (H) 26.0 - 34.0 pg     MCHC 33.7 32.0 - 36.0 g/dL     RDW 12.4 11.5 - 14.5 %     Platelets 89 (L) 150 - 450 x10*3/uL   CBC   Result Value Ref Range     WBC 10.5 4.4 - 11.3 x10*3/uL     nRBC 0.0 0.0 - 0.0 /100 WBCs     RBC 2.83 (L) 4.50 - 5.90 x10*6/uL     Hemoglobin 9.2 (L) 13.5 - 17.5 g/dL     Hematocrit 27.7 (L) 41.0 - 52.0 %     MCV 98 80 - 100 fL     MCH 32.5 26.0 - 34.0 pg     MCHC 33.2 32.0 - 36.0 g/dL     RDW 14.2 11.5 - 14.5 %     Platelets 97 (L) 150 - 450 x10*3/uL   Basic Metabolic Panel   Result Value Ref Range     Glucose 201 (H) 74 - 99 mg/dL     Sodium 134 (L) 136 - 145 mmol/L     Potassium 4.3 3.5 - 5.3 mmol/L     Chloride 103 98 - 107 mmol/L     Bicarbonate 23 21 - 32 mmol/L     Anion Gap 12 10 - 20 mmol/L     Urea Nitrogen 21 6 - 23 mg/dL     Creatinine 0.86 0.50 - 1.30 mg/dL     eGFR 88 >60 mL/min/1.73m*2     Calcium 7.0 (L) 8.6 - 10.3 mg/dL    Protime-INR   Result Value Ref Range     Protime 18.2 (H) 9.8 - 12.8 seconds     INR 1.6 (H) 0.9 - 1.1   Protime-INR   Result Value Ref Range     Protime 20.4 (H) 9.8 - 12.8 seconds     INR 1.8 (H) 0.9 - 1.1      XR hip left with pelvis when performed 2 or 3 views     Result Date: 3/12/2024  Interpreted By:  Ryan Dorman, STUDY: XR HIP LEFT WITH PELVIS WHEN PERFORMED 2 OR 3 VIEWS; XR FEMUR LEFT 2+ VIEWS;  3/11/2024 10:29 pm   INDICATION: Signs/Symptoms:Postop; Signs/Symptoms:postop.   COMPARISON: Previous exam from 03/08/2024.  .   ACCESSION NUMBER(S): [id number]; [id number]   ORDERING CLINICIAN: CHANTELL CARSON   TECHNIQUE: AP view pelvis, two views left hip, and four views left femur were obtained.   FINDINGS: Patient had a previous total left hip arthroplasty with subsequent acute proximal femoral fracture. Patient is now status post revision placement of a new long femoral component with multiple proximal femoral cerclage wires. There is anatomic alignment of the fracture fragments. Femoral and acetabular components are well seated and in good alignment. There is mild postsurgical air in the soft tissues and there is a surgical drain in place. There is no additional femoral fracture. No destructive bone lesion.        Interval revision of previous left hip arthroplasty with periprosthetic fracture. There is a new long-stem femoral component in place with multiple proximal femoral cerclage wires and anatomic alignment of the previous fracture fragments.   MACRO: None   Signed by: Ryan Dorman 3/12/2024 8:13 AM Dictation workstation:   [id number]     XR femur left 2+ views     Result Date: 3/12/2024  Interpreted By:  Ryan Dorman, STUDY: XR HIP LEFT WITH PELVIS WHEN PERFORMED 2 OR 3 VIEWS; XR FEMUR LEFT 2+ VIEWS;  3/11/2024 10:29 pm   INDICATION: Signs/Symptoms:Postop; Signs/Symptoms:postop.   COMPARISON: Previous exam from 03/08/2024.  .   ACCESSION NUMBER(S): [id number]; [id number]   ORDERING  CLINICIAN: CHANTELL CARSON   TECHNIQUE: AP view pelvis, two views left hip, and four views left femur were obtained.   FINDINGS: Patient had a previous total left hip arthroplasty with subsequent acute proximal femoral fracture. Patient is now status post revision placement of a new long femoral component with multiple proximal femoral cerclage wires. There is anatomic alignment of the fracture fragments. Femoral and acetabular components are well seated and in good alignment. There is mild postsurgical air in the soft tissues and there is a surgical drain in place. There is no additional femoral fracture. No destructive bone lesion.        Interval revision of previous left hip arthroplasty with periprosthetic fracture. There is a new long-stem femoral component in place with multiple proximal femoral cerclage wires and anatomic alignment of the previous fracture fragments.   MACRO: None   Signed by: Ryan Dorman 3/12/2024 8:13 AM Dictation workstation:   [id number]     Electrocardiogram, 12-lead PRN ACS symptoms     Result Date: 3/12/2024  Atrial fibrillation with rapid ventricular response with premature ventricular or aberrantly conducted complexes Low voltage QRS ST & T wave abnormality, consider anterior ischemia or digitalis effect Abnormal ECG When compared with ECG of 09-MAR-2024 18:38, No significant change was found Confirmed by Kenney Campbell ([id number]) on 3/12/2024 8:06:06 AM     [place] less than 1 hour     Result Date: 3/11/2024  These images are not reportable by radiology and will not be interpreted by  Radiologists.     ECG 12 lead     Result Date: 3/11/2024  Atrial fibrillation with rapid ventricular response Nonspecific ST and T wave abnormality , probably digitalis effect Abnormal ECG When compared with ECG of 09-MAR-2024 18:39, (unconfirmed) No significant change was found     XR pelvis 1-2 views     Result Date: 3/8/2024  Interpreted By:  Fausto Alan, STUDY: XR PELVIS 1-2 VIEWS; ;  3/8/2024 3:24 pm    INDICATION: Signs/Symptoms:As low/inferior as possible while still including superior end of implant. Goal is to include as much of bilateral femurs (please ensure proper rotation of right femur) as possible for preoperative planning. Please Epic chat me with questions. Thank you..   COMPARISON: 04/26/2023 left hip radiograph   ACCESSION NUMBER(S): [id number]   ORDERING CLINICIAN: CHANTELL CARSON   FINDINGS: The proximal left femoral shaft has an acute oblique fracture extending from the greater trochanter inferiorly and medially exiting the medial cortex 3 cm superior to the distal end of the fitted femoral prosthetic stem.   The lateral fragment is distracted laterally and displaced proximally by about 1 cm with minimal lateral angulation.   No dislocation of the left prosthesis is noted.   No fracture or dislocation of the right hip is noted.        Acute fracture of the proximal left femoral shaft.     MACRO: None   Signed by: Fausto Alan 3/8/2024 3:27 PM Dictation workstation:   [id number]     ECG 12 lead     Result Date: 3/8/2024  Atrial fibrillation Abnormal ECG When compared with ECG of 21-OCT-2022 11:11, No significant change was found     CT pelvis wo IV contrast     Result Date: 3/8/2024  Interpreted By:  Desiree Lewis, STUDY: CT PELVIS WO IV CONTRAST; ;  3/8/2024 1:24 am   INDICATION: Signs/Symptoms:orthopedic fractures.   COMPARISON: Correlation with CT left hip and CT femur 03/07/2024   ACCESSION NUMBER(S): [id number]   ORDERING CLINICIAN: TYLER ZAMORANO   TECHNIQUE: Noncontrast CT images of the pelvis with axial, sagittal and coronal reconstructed images.   FINDINGS: Acute nondisplaced fractures of the left superior and inferior pubic rami are again noted, no significant change in alignment. There is also an acute nondisplaced fracture of the left sacral ala, not included on prior imaging. No sacroiliac or pubic symphysis diastasis.   Left total hip arthroplasty and the known left femur  periprosthetic fracture are partially imaged. Mild to moderate right hip osteoarthrosis noted. Moderate to severe degenerative disc changes at L3-4, L4-5 and L5-S1.   Mild edema/hematoma adjacent to the left inferior pubic ramus. Visualized intrapelvic structures are unremarkable.        Acute nondisplaced left superior and inferior pubic rami fractures. Acute nondisplaced left sacral ala fracture. No sacroiliac or pubic symphysis diastasis.   Partially imaged known left periprosthetic femur fracture.     MACRO: None   Signed by: Desiree Lewis 3/8/2024 1:46 AM Dictation workstation:   [id number]     CT hip left wo IV contrast     Result Date: 3/7/2024  Interpreted By:  Desiree Lewis, STUDY: CT HIP LEFT WO IV CONTRAST; CT FEMUR LEFT WO IV CONTRAST; ;  3/7/2024 5:55 pm   INDICATION: Signs/Symptoms:eval L femur fracture; Signs/Symptoms:eval femur fracture.   COMPARISON: Radiographs of the same day   ACCESSION NUMBER(S): [id number]; [id number]   ORDERING CLINICIAN: BELEN PEREZ   TECHNIQUE: Noncontrast CT images of the left hip and left femur with axial, sagittal and coronal reconstructed images. Metal artifact reduction technique was used.   FINDINGS: Left total hip arthroplasty is again noted. Acute periprosthetic femur fracture is again noted. The proximal extent of the fracture is just distal to the greater trochanter, the fracture line extends to the medial femoral diaphysis, 2-3 cm proximal to the femoral stem tip. There is mild comminution, with a not significantly displaced posterior butterfly fragment. There is 2 cm medial to lateral and anterior to posterior displacement of the dominant fracture fragments. The knee is externally rotated.   There are acute appearing nondisplaced left superior and inferior pubic rami fractures.   Subchondral cyst noted in the left acetabulum.   Mild tricompartmental osteoarthrosis of the knee.   There is edema/hematoma in the proximal to mid anterior compartment of the  thigh. Mild hematoma around the left inferior pubic ramus. Visualized intrapelvic structures are unremarkable.        Acute, displaced left periprosthetic proximal femur fracture.   Acute, nondisplaced left pubic rami fractures.   Edema/hematoma in the anterior compartment of the thigh and adjacent to the inferior pubic ramus.     MACRO: None   Signed by: Desiree Lewis 3/7/2024 7:06 PM Dictation workstation:   [id number]     CT femur left wo IV contrast     Result Date: 3/7/2024  Interpreted By:  Desiree Lewis, STUDY: CT HIP LEFT WO IV CONTRAST; CT FEMUR LEFT WO IV CONTRAST; ;  3/7/2024 5:55 pm   INDICATION: Signs/Symptoms:eval L femur fracture; Signs/Symptoms:eval femur fracture.   COMPARISON: Radiographs of the same day   ACCESSION NUMBER(S): [id number]; [id number]   ORDERING CLINICIAN: BELEN PEREZ   TECHNIQUE: Noncontrast CT images of the left hip and left femur with axial, sagittal and coronal reconstructed images. Metal artifact reduction technique was used.   FINDINGS: Left total hip arthroplasty is again noted. Acute periprosthetic femur fracture is again noted. The proximal extent of the fracture is just distal to the greater trochanter, the fracture line extends to the medial femoral diaphysis, 2-3 cm proximal to the femoral stem tip. There is mild comminution, with a not significantly displaced posterior butterfly fragment. There is 2 cm medial to lateral and anterior to posterior displacement of the dominant fracture fragments. The knee is externally rotated.   There are acute appearing nondisplaced left superior and inferior pubic rami fractures.   Subchondral cyst noted in the left acetabulum.   Mild tricompartmental osteoarthrosis of the knee.   There is edema/hematoma in the proximal to mid anterior compartment of the thigh. Mild hematoma around the left inferior pubic ramus. Visualized intrapelvic structures are unremarkable.        Acute, displaced left periprosthetic proximal femur  fracture.   Acute, nondisplaced left pubic rami fractures.   Edema/hematoma in the anterior compartment of the thigh and adjacent to the inferior pubic ramus.     MACRO: None   Signed by: Desiree Lewis 3/7/2024 7:06 PM Dictation workstation:   [id number]     XR shoulder left 2+ views     Result Date: 3/7/2024  Interpreted By:  Dave Hatfeild, STUDY: XR SHOULDER LEFT 2+ VIEWS; ;  3/7/2024 2:11 pm   INDICATION: Signs/Symptoms:trauma.   COMPARISON: None.   ACCESSION NUMBER(S): [id number]   ORDERING CLINICIAN: PREETHI RICHARD   FINDINGS: No acute fracture or glenohumeral dislocation.No acromioclavicular seperation.Mild acromioclavicular degenerative changes.        No acute osseous abnormality left shoulder.     MACRO: None   Signed by: Dave Hatfield 3/7/2024 2:22 PM Dictation workstation:   [id number]     XR femur left 2+ views     Result Date: 3/7/2024  Interpreted By:  Dave Hatfield, STUDY: XR PELVIS 1-2 VIEWS; XR FEMUR LEFT 2+ VIEWS; ;  3/7/2024 2:11 pm   INDICATION: Signs/Symptoms:trauma.   COMPARISON: 04/26/2023   ACCESSION NUMBER(S): [id number]; [id number]   ORDERING CLINICIAN: BELEN PEREZ; PREETHI RICHARD   FINDINGS: Frontal view the pelvis and four views of the left femur. There is an obliquely oriented, displaced periprosthetic fracture traversing region of patient's femoral stem component of left hip arthroplasty. No dislocation. Imaged distal femur appears intact.        Acute, obliquely oriented periprosthetic fracture of the left proximal femur across region of femoral stem component of left hip arthroplasty.     MACRO: None   Signed by: Dave Hatfield 3/7/2024 2:21 PM Dictation workstation:   [id number]     XR pelvis 1-2 views     Result Date: 3/7/2024  Interpreted By:  Dave Hatfield, STUDY: XR PELVIS 1-2 VIEWS; XR FEMUR LEFT 2+ VIEWS; ;  3/7/2024 2:11 pm   INDICATION: Signs/Symptoms:trauma.   COMPARISON: 04/26/2023   ACCESSION NUMBER(S): [id number]; [id number]   ORDERING CLINICIAN:  BELEN PEREZ; PREETHI RICHARD   FINDINGS: Frontal view the pelvis and four views of the left femur. There is an obliquely oriented, displaced periprosthetic fracture traversing region of patient's femoral stem component of left hip arthroplasty. No dislocation. Imaged distal femur appears intact.        Acute, obliquely oriented periprosthetic fracture of the left proximal femur across region of femoral stem component of left hip arthroplasty.     MACRO: None   Signed by: Dave Hatfield 3/7/2024 2:21 PM Dictation workstation:   [id number]                    Outpatient Follow-Up  Future Appointments   Date Time Provider Department Center   3/21/2024  8:30 AM PAR [id number] PHARM [facility] PHARMACIST [id number] [place]         Kris Kuhn MD

## 2024-03-16 LAB
ATRIAL RATE: 234 BPM
Q ONSET: 228 MS
QRS COUNT: 16 BEATS
QRS DURATION: 74 MS
QT INTERVAL: 354 MS
QTC CALCULATION(BAZETT): 451 MS
QTC FREDERICIA: 417 MS
R AXIS: -14 DEGREES
T AXIS: -9 DEGREES
T OFFSET: 405 MS
VENTRICULAR RATE: 98 BPM

## 2024-03-25 ENCOUNTER — APPOINTMENT (OUTPATIENT)
Dept: ORTHOPEDIC SURGERY | Facility: CLINIC | Age: 79
End: 2024-03-25
Payer: MEDICARE

## 2024-03-27 NOTE — ED PROCEDURE NOTE
Procedure  Procedures      EKG interpreted by Dr. Gonzalez. Indication: pre-op. Findings: Atrial fibrillation with rapid ventricular response and rate of 119, right axis, normal intervals, and no acute ischemic or injury pattern. Impression: Atrial fibrillation with RVR.          Amos Hlil, KARAN-CNP  03/27/24 0629

## 2024-03-29 NOTE — PROGRESS NOTES
GITA/TKA Related Summary           L hip  11/2/2022: Primary GITA (Dr. Michael Lopresti at [facility])  3/11/2024: B2 Periprosthetic fx stem revision, ORIF (myself at )  L knee: N  R hip: N  R knee: N  Lumbar surgery or significant pathology: N            CC/SUBJECTIVE/HPI            PCP: Wali Chen DO  Referring provider: Inpatient consult follow-up  Occupation: Retired ( at [employer])  Hobbies: Working around house and yard  José Barth is a 79 y.o. male following up regarding below concerns. Residing at care facility.  He plans to move into a long-term care facility in the next few weeks.  His wife will be moving in there to, and he is looking forward to his situation in which they can both be cared for.    Left nondisplaced LC1 fracture  Permitted progressive WBAT with a walker, but pt is currently NWB LLE due to femur fx.    Operative joint: L hip  S/p L revision GITA, femoral ORIF on 3/11/2024. No significant interval issues reported with operative site. Has been NWB with no active abduction, posterior precautions.  Reports outcome as good  Pain mgmt: OTC meds and narcotics  Assistive device: Unfortunately his care facility interpreted NWB as being wheelchair-bound instead of ambulating with a wheeled walker.  Able to navigate stairs: N  Sleeping normally: Y  PT: yes, current (5/wk)  DVT prophylaxis: Baseline warfarin  A possible greater trochanteric fracture was identified on the postoperative radiographs. Thus, the patient was instructed to perform no active abduction.    Groin presumed fungal infxn  Per ID consult as inpatient, treating with nystatin powder.            HISTORIES (System Generated and Pt-Reported on Form Today)       Patient reports no major changes in health, substance use, or baseline medications since initial consult..            OBJECTIVE            Physical exam  Estimated body mass index is 23.62 kg/m² as calculated from the following:    Height as of  "3/8/24: 1.753 m (5' 9.02\").    Weight as of 3/8/24: 72.6 kg (160 lb).  Gen/psych: NAD, conversational, appropriate    Ambulation  Gait: Unable  Assistive device: wheelchair  Spine: Unchanged from previous visit    Groin  Significant improvement in excoriation and exudate from the time of surgery.  Is being treated consistently with nystatin powder.    Focused MSK exam: L  GITA  Trendelenberg test: Unable to perform, on abductor restrictions  Skin/incision: CDI, healing appropriately.  Nylon sutures in place.  Tenderness: peritrochanteric and gluteal  Pain with log roll: neg  ROM: within expected range but painful  Neurovascular    Strength: 4+/5 hip/knee flexion and extension, 4+/5 plantarflexion, 4/5 dorsiflexion  Sensory (L2-S1): SILT throughout lower extremity  Edema/stasis: 0-10mm pitting edema below the knee.  Extremity wrapped in Ace bandage from facility, unevenly wrapped and producing tourniquet effect.  Pulse: DP 1+, PT 1+    Labs  Micro  -Hip intra-op cx: All NGF  -Groin swabs: Abundant PMN, mixed GP bacteria. Fungal NGTD. Per ID recs, cx not relevant (reflects local liudmila) and recommended nystatin powder    Imaging  4/1/2024 L hip radiographs (AP Pelvis, AP/Lateral) on my read: Revision GITA components in acceptable position with no sign of gross implant/hardware failure. Although the views are not identical as indicated by the angled cerclage wires, compared to 3/11/2024 radiographs, stem appears to have subsided about 1 cm when compared to the calcar.  Additionally, there appears to be a greater trochanter fragment that has migrated proximally about 1.5cm but is still connected to a longitudinal fragment. LC1 fx with interval healing.  4/1/2024 L femur radiographs (AP/Lateral) on my read: Revision stem in place with no signs of gross implant/hardware failure.              ASSESSMENT & PLAN           Patient verbalized understanding of below A&P. All questions answered.  Groin excoriation  Per ID, continue " nystatin powder PRN   Left nondisplaced LC1 fracture  Interval healing seen on today's radiograph.  WBAT with a walker once LLE allows.  L GITA periprosthetic femur fracture  Micro: No further abx, as all intraop cultures NGF  Incision: Dressing removed. Incision care discussed. Keep clean, dry. May shower but no bathing/scrubbing/swimming until completely healed.  Activity  Progress to 25% PWB LLE  Posterior precautions.  Has been following posterior precautions, but today we provided him with an abd brace locked to block flexion past 70 as he becomes more mobile.  DVT ppx: Baseline warfarin (afib)  Dressing: Daily dry dressing PRN  Swelling: Compression hose instead of Ace wrap  Follow-up: 2 weeks.  He is going to advance his weightbearing to 25% and maintain no active abduction.  I would like to keep a close eye on his trochanter migration.  Will get repeat radiographs at his next appointment and decide whether intervention is necessary.  Discussed today's radiographs and that plan with Dr. Enamorado, one of our orthopedic traumatologists who assisted with the surgery.  PMH, PSH, other considerations discussed  Hx of L sciatica, lumbar radiculopathy  LLE chronic swelling, varicose veins

## 2024-04-01 ENCOUNTER — OFFICE VISIT (OUTPATIENT)
Dept: ORTHOPEDIC SURGERY | Facility: CLINIC | Age: 79
End: 2024-04-01
Payer: MEDICARE

## 2024-04-01 ENCOUNTER — APPOINTMENT (OUTPATIENT)
Dept: RADIOLOGY | Facility: CLINIC | Age: 79
End: 2024-04-01
Payer: MEDICARE

## 2024-04-01 ENCOUNTER — HOSPITAL ENCOUNTER (OUTPATIENT)
Dept: RADIOLOGY | Facility: CLINIC | Age: 79
Discharge: HOME | End: 2024-04-01
Payer: MEDICARE

## 2024-04-01 VITALS — WEIGHT: 160 LBS | BODY MASS INDEX: 23.7 KG/M2 | HEIGHT: 69 IN

## 2024-04-01 DIAGNOSIS — Z96.642 HISTORY OF REVISION OF TOTAL REPLACEMENT OF LEFT HIP JOINT: ICD-10-CM

## 2024-04-01 DIAGNOSIS — Z96.642 HISTORY OF REVISION OF TOTAL REPLACEMENT OF LEFT HIP JOINT: Primary | ICD-10-CM

## 2024-04-01 LAB
FUNGUS SPEC CULT: NORMAL
FUNGUS SPEC FUNGUS STN: NORMAL

## 2024-04-01 PROCEDURE — 73552 X-RAY EXAM OF FEMUR 2/>: CPT | Mod: LEFT SIDE | Performed by: RADIOLOGY

## 2024-04-01 PROCEDURE — 1159F MED LIST DOCD IN RCRD: CPT | Performed by: STUDENT IN AN ORGANIZED HEALTH CARE EDUCATION/TRAINING PROGRAM

## 2024-04-01 PROCEDURE — 99024 POSTOP FOLLOW-UP VISIT: CPT | Performed by: STUDENT IN AN ORGANIZED HEALTH CARE EDUCATION/TRAINING PROGRAM

## 2024-04-01 PROCEDURE — 73552 X-RAY EXAM OF FEMUR 2/>: CPT | Mod: LT

## 2024-04-01 PROCEDURE — 73502 X-RAY EXAM HIP UNI 2-3 VIEWS: CPT | Mod: LT

## 2024-04-01 PROCEDURE — 1111F DSCHRG MED/CURRENT MED MERGE: CPT | Performed by: STUDENT IN AN ORGANIZED HEALTH CARE EDUCATION/TRAINING PROGRAM

## 2024-04-01 PROCEDURE — 73502 X-RAY EXAM HIP UNI 2-3 VIEWS: CPT | Mod: LEFT SIDE | Performed by: RADIOLOGY

## 2024-04-01 PROCEDURE — 1036F TOBACCO NON-USER: CPT | Performed by: STUDENT IN AN ORGANIZED HEALTH CARE EDUCATION/TRAINING PROGRAM

## 2024-04-01 ASSESSMENT — PAIN - FUNCTIONAL ASSESSMENT: PAIN_FUNCTIONAL_ASSESSMENT: 0-10

## 2024-04-01 ASSESSMENT — PAIN DESCRIPTION - DESCRIPTORS: DESCRIPTORS: TINGLING

## 2024-04-01 ASSESSMENT — PAIN SCALES - GENERAL: PAINLEVEL_OUTOF10: 5 - MODERATE PAIN

## 2024-04-01 NOTE — LETTER
April 1, 2024     Patient: José Barth   YOB: 1945   Date of Visit: 4/1/2024       To Whom It May Concern:    José is under my care for a significant injury to the left femur.  He had surgery on 3/11/2024.  I saw him in clinic today, and the plan is as follows:  - He may advance his weightbearing to 25% of his weight on the left lower extremity.  - No active abduction  - Knee-high LARA hose rather than the Ace bandage.  - He will be receiving a hip brace, which will be set to block his hip flexion past 70 degrees.  He should wear this at all times except while showering.  - No water submersion (bath, swimming) until next appointment.  - Follow-up in 2 weeks.    If you have any questions or concerns, please don't hesitate to call.         Sincerely,        Juno Ruffin MD    CC: No Recipients

## 2024-04-02 PROCEDURE — L1686 HO POST-OP HIP ABDUCTION: HCPCS | Performed by: STUDENT IN AN ORGANIZED HEALTH CARE EDUCATION/TRAINING PROGRAM

## 2024-04-03 ENCOUNTER — ANTICOAGULATION - WARFARIN VISIT (OUTPATIENT)
Dept: PHARMACY | Facility: CLINIC | Age: 79
End: 2024-04-03
Payer: MEDICARE

## 2024-04-03 ENCOUNTER — TELEPHONE (OUTPATIENT)
Dept: PHARMACY | Facility: CLINIC | Age: 79
End: 2024-04-03
Payer: MEDICARE

## 2024-04-03 DIAGNOSIS — I48.21 PERMANENT ATRIAL FIBRILLATION (MULTI): Primary | ICD-10-CM

## 2024-04-03 DIAGNOSIS — I48.0 PAROXYSMAL ATRIAL FIBRILLATION (MULTI): ICD-10-CM

## 2024-04-03 NOTE — PROGRESS NOTES
Spoke with patient - he had a hip replacement 3/11/24, and is now in a SNF for Rehab. He stated he thinks he will be there for about one month, and that someone is managing his warfarin  there during his stay. Updated POC INR tracker for 1 month from today, when we will re-assess patient status/needs.     Carrie Bhatti, JoseD, BCPS   4/3/2024 2:29 PM

## 2024-04-15 ENCOUNTER — OFFICE VISIT (OUTPATIENT)
Dept: ORTHOPEDIC SURGERY | Facility: CLINIC | Age: 79
End: 2024-04-15
Payer: MEDICARE

## 2024-04-15 ENCOUNTER — HOSPITAL ENCOUNTER (OUTPATIENT)
Dept: RADIOLOGY | Facility: CLINIC | Age: 79
Discharge: HOME | End: 2024-04-15
Payer: MEDICARE

## 2024-04-15 VITALS — BODY MASS INDEX: 23.7 KG/M2 | WEIGHT: 160 LBS | HEIGHT: 69 IN

## 2024-04-15 DIAGNOSIS — Z96.642 HISTORY OF REVISION OF TOTAL REPLACEMENT OF LEFT HIP JOINT: ICD-10-CM

## 2024-04-15 DIAGNOSIS — Z96.642 HISTORY OF REVISION OF TOTAL REPLACEMENT OF LEFT HIP JOINT: Primary | ICD-10-CM

## 2024-04-15 PROCEDURE — 99024 POSTOP FOLLOW-UP VISIT: CPT | Performed by: STUDENT IN AN ORGANIZED HEALTH CARE EDUCATION/TRAINING PROGRAM

## 2024-04-15 PROCEDURE — 1125F AMNT PAIN NOTED PAIN PRSNT: CPT | Performed by: STUDENT IN AN ORGANIZED HEALTH CARE EDUCATION/TRAINING PROGRAM

## 2024-04-15 PROCEDURE — 1159F MED LIST DOCD IN RCRD: CPT | Performed by: STUDENT IN AN ORGANIZED HEALTH CARE EDUCATION/TRAINING PROGRAM

## 2024-04-15 PROCEDURE — 73502 X-RAY EXAM HIP UNI 2-3 VIEWS: CPT | Mod: LT

## 2024-04-15 PROCEDURE — 73502 X-RAY EXAM HIP UNI 2-3 VIEWS: CPT | Mod: LEFT SIDE | Performed by: RADIOLOGY

## 2024-04-15 ASSESSMENT — PAIN DESCRIPTION - DESCRIPTORS: DESCRIPTORS: ACHING

## 2024-04-15 ASSESSMENT — PAIN - FUNCTIONAL ASSESSMENT: PAIN_FUNCTIONAL_ASSESSMENT: 0-10

## 2024-04-15 ASSESSMENT — PAIN SCALES - GENERAL: PAINLEVEL_OUTOF10: 6

## 2024-04-15 NOTE — PROGRESS NOTES
"GITA/TKA Related Summary           L hip  11/2/2022: Primary GITA (Dr. Michael Lopresti at [facility])  3/11/2024: B2 Periprosthetic fx stem revision, ORIF (myself at )  L knee: N  R hip: N  R knee: N  Lumbar surgery or significant pathology: N            CC/SUBJECTIVE/HPI            José Barth is a 79 y.o. male following up regarding below concerns.  Since our last appointment, he moved from the nursing facility to a long-term care facility, where he now lives with his wife.  Overall, he reports doing fairly well.  He has been mobilizing with a walker.    Left nondisplaced LC1 fracture  Permitted progressive WBAT with a walker, but pt is currently NWB LLE due to femur fx.    Operative joint: L hip  S/p L revision GITA, femoral ORIF on 3/11/2024. No significant interval issues reported with operative site. Has been 25% PWB, abd brace hip 0-70, no active abduction.  Reports outcome as good  Pain mgmt: OTC meds  Assistive device: walker and wheelchair for distances  Able to navigate stairs: N  Sleeping normally: Y  PT: yes, current (2/wk)  DVT prophylaxis: Baseline warfarin    Groin presumed fungal infxn  Per ID consult as inpatient, treating with nystatin powder.             HISTORIES            Patient reports no major changes in health, substance use, or baseline medications since last visit.            OBJECTIVE            Physical exam  Estimated body mass index is 23.61 kg/m² as calculated from the following:    Height as of 4/1/24: 1.753 m (5' 9.02\").    Weight as of 4/1/24: 72.6 kg (160 lb).  Gen/psych: NAD, conversational, appropriate    Ambulation  Gait: Several steps  Assistive device: wheeled walker  Spine: Unchanged from previous visit     Groin  Significant interval improvement.  Is being treated consistently with nystatin powder.     Focused MSK exam: L  GITA  Trendelenberg test: Unable to perform, on abductor restrictions  Skin/incision: CDI, healing appropriately.  Nylon sutures removed " today  Tenderness: peritrochanteric and gluteal  Pain with log roll: neg  ROM: within expected range but painful  Neurovascular  Strength: 4+/5 hip/knee flexion and extension, 4+/5 plantarflexion, 4/5 dorsiflexion  Sensory (L2-S1): SILT throughout lower extremity  Edema/stasis: 10mm pitting edema below the knee.  Extremity wrapped in Ace bandage from facility, unevenly wrapped and producing tourniquet effect.  Pulse: DP 1+, PT 1+    Imaging  4/15/2024 L hip radiographs (AP Pelvis, AP/Lateral) on my read: Revision GITA components in acceptable position with no sign of gross implant/hardware failure.  Today's image appears to be a closer projection to the 3/11/2024 immediate postoperative radiographs.  In contrast to the 4/1/2024 image, today's image shows less, if any, stem subsidence.  There does appear to still be some proximal trochanteric migration, although it is <1cm on today's image. LC1 fx with interval healing.             ASSESSMENT & PLAN           Patient verbalized understanding of below A&P. All questions answered.  Groin excoriation  Per ID, continue nystatin powder PRN   Left nondisplaced LC1 fracture  Interval healing seen on today's radiograph.  WBAT with a walker once LLE allows.  L GITA periprosthetic femur fracture (rTHA, ORIF 3/11/2024)  Imaging and outcomes reviewed with pt.  Micro: No further abx, as all intraop cultures NGF  Incision: Nylons removed today.  Incision care discussed. Keep clean, dry. May shower but no bathing/scrubbing/swimming until completely healed.  Activity: Abd brace 0-70 hip flexion. Progress 25% to 50% PWB LLE on 5/1/2024 (~6wks postop) with plans to potentially progress to FWB at next appt. Posterior precautions.  No active abduction.  DVT ppx: Baseline warfarin (afib)  Dressing: Daily dry dressing PRN  Swelling: José is due to receive new LARA hose 3 days from now.  Discussed the importance of wearing these diligently, as he has significant swelling in the LLE from  the knee down.  Follow-up: 2mo postop for repeat radiographs to monitor for subsidence and/for trochanter migration.  Discussed today's radiographs and that plan with Dr. Enamorado, one of our orthopedic traumatologists who assisted with the surgery.  PMH, PSH, other considerations discussed  Hx of L sciatica, lumbar radiculopathy  LLE chronic swelling, varicose veins

## 2024-05-15 ENCOUNTER — TELEPHONE (OUTPATIENT)
Dept: PHARMACY | Facility: CLINIC | Age: 79
End: 2024-05-15
Payer: MEDICARE

## 2024-05-15 ENCOUNTER — ANTICOAGULATION - WARFARIN VISIT (OUTPATIENT)
Dept: PHARMACY | Facility: CLINIC | Age: 79
End: 2024-05-15
Payer: MEDICARE

## 2024-05-15 DIAGNOSIS — I48.0 PAROXYSMAL ATRIAL FIBRILLATION (MULTI): ICD-10-CM

## 2024-05-15 DIAGNOSIS — I48.91 ATRIAL FIBRILLATION, UNSPECIFIED TYPE (MULTI): ICD-10-CM

## 2024-05-15 DIAGNOSIS — I48.21 PERMANENT ATRIAL FIBRILLATION (MULTI): Primary | ICD-10-CM

## 2024-05-15 NOTE — PROGRESS NOTES
Spoke with patient, he is out of RHB and needs to get his INR checked, but stated he would prefer to do it in 1.5 weeks or so. Patient stated he would call us back. Updated POC INR tracker for 1.5 weeks, at which time we will re-assess patient status if he has not yet called us back to schedule by that date.     Carrie Bhatti, PharmD, BCPS   5/15/2024 2:03 PM

## 2024-05-16 RX ORDER — WARFARIN 4 MG/1
TABLET ORAL
Qty: 100 TABLET | Refills: 3 | Status: SHIPPED | OUTPATIENT
Start: 2024-05-16

## 2024-05-17 ENCOUNTER — HOSPITAL ENCOUNTER (OUTPATIENT)
Dept: RADIOLOGY | Facility: CLINIC | Age: 79
Discharge: HOME | End: 2024-05-17
Payer: MEDICARE

## 2024-05-17 ENCOUNTER — OFFICE VISIT (OUTPATIENT)
Dept: ORTHOPEDIC SURGERY | Facility: CLINIC | Age: 79
End: 2024-05-17
Payer: MEDICARE

## 2024-05-17 VITALS — HEIGHT: 69 IN | BODY MASS INDEX: 23.7 KG/M2 | WEIGHT: 160 LBS

## 2024-05-17 DIAGNOSIS — Z96.642 HISTORY OF REVISION OF TOTAL REPLACEMENT OF LEFT HIP JOINT: ICD-10-CM

## 2024-05-17 DIAGNOSIS — Z96.642 HISTORY OF REVISION OF TOTAL REPLACEMENT OF LEFT HIP JOINT: Primary | ICD-10-CM

## 2024-05-17 PROCEDURE — 1159F MED LIST DOCD IN RCRD: CPT | Performed by: STUDENT IN AN ORGANIZED HEALTH CARE EDUCATION/TRAINING PROGRAM

## 2024-05-17 PROCEDURE — 73502 X-RAY EXAM HIP UNI 2-3 VIEWS: CPT | Mod: LT

## 2024-05-17 PROCEDURE — 99024 POSTOP FOLLOW-UP VISIT: CPT | Performed by: STUDENT IN AN ORGANIZED HEALTH CARE EDUCATION/TRAINING PROGRAM

## 2024-05-17 PROCEDURE — 1036F TOBACCO NON-USER: CPT | Performed by: STUDENT IN AN ORGANIZED HEALTH CARE EDUCATION/TRAINING PROGRAM

## 2024-05-17 PROCEDURE — 73502 X-RAY EXAM HIP UNI 2-3 VIEWS: CPT | Mod: LEFT SIDE | Performed by: STUDENT IN AN ORGANIZED HEALTH CARE EDUCATION/TRAINING PROGRAM

## 2024-05-17 ASSESSMENT — PAIN - FUNCTIONAL ASSESSMENT: PAIN_FUNCTIONAL_ASSESSMENT: NO/DENIES PAIN

## 2024-05-17 NOTE — PROGRESS NOTES
"GITA/TKA Related Summary           L hip  11/2/2022: Primary GITA (Dr. Michael Lopresti at [facility])  3/11/2024: B2 Periprosthetic fx stem revision, ORIF (myself at )  L knee: N  R hip: N  R knee: N  Lumbar surgery or significant pathology: N            CC/SUBJECTIVE/HPI            José Barth is a 79 y.o. male following up regarding below concerns.  He continues to reside in a long-term care facility with his wife.  Since our last appointment, he has progressed quite well with PT and is feeling well.     Left nondisplaced LC1 fracture  Progressive WBAT with a walker.     Operative joint: L hip  S/p L revision GITA, femoral ORIF on 3/11/2024. No significant interval issues reported with operative site.  Reports outcome as good  Pain mgmt: OTC meds  Assistive device: walker and wheelchair for distances  Able to navigate stairs: N  Sleeping normally: Y  PT: yes, current (2/wk)  DVT prophylaxis: Baseline warfarin    Groin presumed fungal infxn  Per ID consult as inpatient, treating with nystatin powder.            HISTORIES            Patient reports no major changes in health, substance use, or baseline medications since last visit.            OBJECTIVE            Physical exam  Estimated body mass index is 23.61 kg/m² as calculated from the following:    Height as of 4/15/24: 1.753 m (5' 9.02\").    Weight as of 4/15/24: 72.6 kg (160 lb).  Gen/psych: NAD, conversational, appropriate    Ambulation  Gait: Slow and measured  Assistive device: Walker  Spine: Unchanged from previous visit     Groin  Significant interval improvement.  Is being treated consistently with nystatin powder.     Focused MSK exam: L  GITA  Trendelenberg test: Unable to perform, on abductor restrictions  Skin/incision: Well healed  Tenderness: None  Pain with log roll: Neg  ROM: within expected range, nonpainful  Neurovascular  Strength: 4+/5 hip/knee flexion and extension, 4+/5 plantarflexion, 4/5 dorsiflexion  Sensory (L2-S1): SILT " throughout lower extremity  Edema/stasis: 10mm pitting edema below the knee, similar to his last appointment  Pulse: DP 1+, PT 1+    Imaging  5/17/2024 L hip radiographs (AP Pelvis, AP/Lateral) on my read: Revision GITA components in acceptable position with no sign of gross implant/hardware failure.  Today's image is again a slightly different perspective, but his stem appears to be stable and GT with no significant migration. LC1 fx with interval healing.             ASSESSMENT & PLAN           Patient verbalized understanding of below A&P. All questions answered.  Groin excoriation  Per ID, continue nystatin powder PRN  Left nondisplaced LC1 fracture  Interval healing seen on today's radiograph.  WBAT  L GITA periprosthetic femur fracture (rTHA, ORIF 3/11/2024)  Imaging and outcomes reviewed with pt.  Micro: No further abx, as all intraop cultures NGF  Incision: Well healed  Activity: Abd brace 0-70 hip flexion. Progress 50% to FWB. Posterior precautions.  No active abduction[facility] allow [facility]duction at 3mo postop.  DVT ppx: Baseline warfarin (afib)  Dressing: Daily dry dressing PRN  Swelling: Unfortunately, the compression stockings provided at the facility are of suboptimal quality.  Therefore, I provided him with true LARA hose today in an effort to reduce his swelling.  Follow-up: 3mo postop for repeat radiographs to monitor for subsidence and/for trochanter migration.  PMH, PSH, other considerations discussed  Hx of L sciatica, lumbar radiculopathy  LLE chronic swelling, varicose veins

## 2024-06-10 ENCOUNTER — ANTICOAGULATION - WARFARIN VISIT (OUTPATIENT)
Dept: PHARMACY | Facility: CLINIC | Age: 79
End: 2024-06-10
Payer: MEDICARE

## 2024-06-10 DIAGNOSIS — I48.0 PAROXYSMAL ATRIAL FIBRILLATION (MULTI): Primary | ICD-10-CM

## 2024-06-10 LAB
POC INR: 2.4
POC PROTHROMBIN TIME: NORMAL

## 2024-06-10 PROCEDURE — 85610 PROTHROMBIN TIME: CPT | Mod: QW | Performed by: PHARMACIST

## 2024-06-10 PROCEDURE — 99212 OFFICE O/P EST SF 10 MIN: CPT | Performed by: PHARMACIST

## 2024-06-10 NOTE — PROGRESS NOTES
Pt had hip replaced 2 years ago.  About the end of Feb 2024 he fell and broke the replacement.  So he has been in rehab since then.  This is his first visit back here since then.   He was discharged 1 week ago from rehab.  He has been taking his 4 mg daily except 1/2 tab (2 mg) wed.  However, he missed his dose this past wed.  Inr = 2.4  Pt doesn't take any pain meds, unless he needs a tylenol - but only once in a great while.    Pt denies unusual bleed/bruise  No upcoming procedures.    No new meds or med changes since his last visit here.  Will keep pt on his usual dose.  Check in

## 2024-06-19 ENCOUNTER — OFFICE VISIT (OUTPATIENT)
Dept: PRIMARY CARE | Facility: CLINIC | Age: 79
End: 2024-06-19
Payer: MEDICARE

## 2024-06-19 VITALS
BODY MASS INDEX: 23.7 KG/M2 | DIASTOLIC BLOOD PRESSURE: 64 MMHG | HEART RATE: 101 BPM | WEIGHT: 160 LBS | TEMPERATURE: 97.3 F | HEIGHT: 69 IN | SYSTOLIC BLOOD PRESSURE: 104 MMHG | OXYGEN SATURATION: 95 %

## 2024-06-19 DIAGNOSIS — H61.21 IMPACTED CERUMEN OF RIGHT EAR: Primary | ICD-10-CM

## 2024-06-19 PROCEDURE — 1158F ADVNC CARE PLAN TLK DOCD: CPT | Performed by: FAMILY MEDICINE

## 2024-06-19 PROCEDURE — 1123F ACP DISCUSS/DSCN MKR DOCD: CPT | Performed by: FAMILY MEDICINE

## 2024-06-19 PROCEDURE — 1159F MED LIST DOCD IN RCRD: CPT | Performed by: FAMILY MEDICINE

## 2024-06-19 PROCEDURE — 1160F RVW MEDS BY RX/DR IN RCRD: CPT | Performed by: FAMILY MEDICINE

## 2024-06-19 PROCEDURE — 1036F TOBACCO NON-USER: CPT | Performed by: FAMILY MEDICINE

## 2024-06-19 PROCEDURE — 99213 OFFICE O/P EST LOW 20 MIN: CPT | Performed by: FAMILY MEDICINE

## 2024-06-19 ASSESSMENT — PATIENT HEALTH QUESTIONNAIRE - PHQ9
1. LITTLE INTEREST OR PLEASURE IN DOING THINGS: NOT AT ALL
2. FEELING DOWN, DEPRESSED OR HOPELESS: NOT AT ALL
SUM OF ALL RESPONSES TO PHQ9 QUESTIONS 1 & 2: 0

## 2024-06-19 NOTE — PROGRESS NOTES
"Complaint of hearing loss and blocked right ear without otalgia otorrhea nasal congestion facial pressure        /64   Pulse 101   Temp 36.3 °C (97.3 °F)   Ht 1.753 m (5' 9\")   Wt 72.6 kg (160 lb)   SpO2 95%   BMI 23.63 kg/m²       Right canal completely obscured by cerumen.  Cerumen removed with cerumen spoon and irrigation completely  Right TM normal  Left canal clear left TM normal  Sinuses nontender  "

## 2024-06-28 ENCOUNTER — HOSPITAL ENCOUNTER (OUTPATIENT)
Dept: RADIOLOGY | Facility: CLINIC | Age: 79
Discharge: HOME | End: 2024-06-28
Payer: MEDICARE

## 2024-06-28 ENCOUNTER — OFFICE VISIT (OUTPATIENT)
Dept: ORTHOPEDIC SURGERY | Facility: CLINIC | Age: 79
End: 2024-06-28
Payer: MEDICARE

## 2024-06-28 DIAGNOSIS — Z96.642 HISTORY OF REVISION OF TOTAL REPLACEMENT OF LEFT HIP JOINT: ICD-10-CM

## 2024-06-28 DIAGNOSIS — Z96.642 HISTORY OF REVISION OF TOTAL REPLACEMENT OF LEFT HIP JOINT: Primary | ICD-10-CM

## 2024-06-28 PROCEDURE — 1123F ACP DISCUSS/DSCN MKR DOCD: CPT | Performed by: STUDENT IN AN ORGANIZED HEALTH CARE EDUCATION/TRAINING PROGRAM

## 2024-06-28 PROCEDURE — 1159F MED LIST DOCD IN RCRD: CPT | Performed by: STUDENT IN AN ORGANIZED HEALTH CARE EDUCATION/TRAINING PROGRAM

## 2024-06-28 PROCEDURE — 99215 OFFICE O/P EST HI 40 MIN: CPT | Performed by: STUDENT IN AN ORGANIZED HEALTH CARE EDUCATION/TRAINING PROGRAM

## 2024-06-28 PROCEDURE — 1036F TOBACCO NON-USER: CPT | Performed by: STUDENT IN AN ORGANIZED HEALTH CARE EDUCATION/TRAINING PROGRAM

## 2024-06-28 PROCEDURE — 73502 X-RAY EXAM HIP UNI 2-3 VIEWS: CPT | Mod: LT

## 2024-06-28 ASSESSMENT — PAIN - FUNCTIONAL ASSESSMENT: PAIN_FUNCTIONAL_ASSESSMENT: NO/DENIES PAIN

## 2024-06-28 NOTE — PROGRESS NOTES
"GITA/TKA Related Summary           L hip  11/2/2022: Primary GITA (Dr. Michael Lopresti at [facility])  3/11/2024: B2 Periprosthetic fx stem revision, ORIF (myself at )  L knee: N  R hip: N  R knee: N  Lumbar surgery or significant pathology: N            CC/SUBJECTIVE/HPI            José Barth is a 79 y.o. male following up regarding below concerns.  He and his wife have both returned home from the care facility.  Overall, he is feeling quite well.      Left nondisplaced LC1 fracture  Progressive WBAT.     Operative joint: L hip  S/p L revision GITA, femoral ORIF on 3/11/2024. No significant interval issues reported with operative site.  Reports outcome as good  Pain mgmt: OTC meds  Assistive device: cane/walker for safety but is able to ambulate independently  Able to navigate stairs: Y  Sleeping normally: Y  PT: completed  DVT prophylaxis: baseline warfarin    Groin presumed fungal infxn  Per ID consult as inpatient, treating with nystatin powder. Resolved.            HISTORIES            Patient reports no major changes in health, substance use, or baseline medications since last visit.            OBJECTIVE            Physical exam  Estimated body mass index is 23.63 kg/m² as calculated from the following:    Height as of 6/19/24: 1.753 m (5' 9\").    Weight as of 6/19/24: 72.6 kg (160 lb).  Gen/psych: NAD, conversational, appropriate    Ambulation  Gait: Nonantalgic, slow and steady  Assistive device: None  Spine: Unchanged from previous visit    Groin  Resolved.     Focused MSK exam: L  GITA  Trendelenberg test: Neg.  Additionally, 4+/5 abductor testing  Skin/incision: Well healed  Tenderness: None  Pain with log roll: Neg  ROM: within expected range, nonpainful  Neurovascular  Strength: 4+/5 hip/knee flexion and extension, 4+/5 plantarflexion, 4/5 dorsiflexion  Sensory (L2-S1): SILT throughout lower extremity  Edema/stasis: 5mm pitting edema below the knee, improved from his last appointment  Pulse: DP " 1+, PT 1+    Imaging  6/27/2024 L hip radiographs (AP Pelvis, AP/Lateral) on my read: Revision GITA components in acceptable position with no sign of gross implant/hardware failure.  Stem and greater trochanter in stable positions. LC1 fx with interval healing.             ASSESSMENT & PLAN           Patient verbalized understanding of below A&P. All questions answered.  Groin excoriation, essentially resolved  Nystatin powder PRN  L nondisplaced LC1 fracture  Interval healing seen on today's radiograph.  WBAT  L GITA periprosthetic femur fracture (rTHA, ORIF 3/11/2024)  Micro: No further abx, as all intraop cultures NGF  Incision: Well healed  Activity: Posterior precautions. Continue  WBAT. DC abd brace and abd precautions.  DVT ppx: Baseline warfarin (afib)  Swelling: Had considerable swelling in foot postop. At last visit, I provided better compression stockings. Since that time, his swelling has moderately improved with just a small residual amount today..  Follow-up: 6mo postop for repeat radiographs to monitor for subsidence and/for trochanter migration.  PMH, PSH, other considerations discussed  Hx of L sciatica, lumbar radiculopathy  LLE chronic swelling, varicose veins  Occupation: Retired ( [employer])  Hobbies: Working around the house, helping take care of his wife (multiple myeloma)

## 2024-07-01 ENCOUNTER — ANTICOAGULATION - WARFARIN VISIT (OUTPATIENT)
Dept: PHARMACY | Facility: CLINIC | Age: 79
End: 2024-07-01
Payer: MEDICARE

## 2024-07-01 DIAGNOSIS — I48.0 PAROXYSMAL ATRIAL FIBRILLATION (MULTI): ICD-10-CM

## 2024-07-01 DIAGNOSIS — I48.21 PERMANENT ATRIAL FIBRILLATION (MULTI): Primary | ICD-10-CM

## 2024-07-01 LAB
POC INR: 2.6
POC PROTHROMBIN TIME: NORMAL

## 2024-07-01 PROCEDURE — 85610 PROTHROMBIN TIME: CPT | Mod: QW

## 2024-07-01 PROCEDURE — 99212 OFFICE O/P EST SF 10 MIN: CPT

## 2024-07-01 NOTE — PROGRESS NOTES
Coumadin Clinic Visit Note    Patient verified warfarin dose  No missed doses  No unusual bruising or bleeding  No changes to medications  Consistent dietary green intake  No anticipated procedures at this time  INR Therapeutic today at 2.6  No changes to warfarin dose today  Next appointment 5 weeks      Ludin Gatica, PharmD

## 2024-07-11 ENCOUNTER — APPOINTMENT (OUTPATIENT)
Dept: PRIMARY CARE | Facility: CLINIC | Age: 79
End: 2024-07-11
Payer: MEDICARE

## 2024-08-05 ENCOUNTER — ANTICOAGULATION - WARFARIN VISIT (OUTPATIENT)
Dept: PHARMACY | Facility: CLINIC | Age: 79
End: 2024-08-05
Payer: MEDICARE

## 2024-08-05 DIAGNOSIS — I48.0 PAROXYSMAL ATRIAL FIBRILLATION (MULTI): ICD-10-CM

## 2024-08-05 DIAGNOSIS — I48.21 PERMANENT ATRIAL FIBRILLATION (MULTI): Primary | ICD-10-CM

## 2024-08-05 LAB
POC INR: 1.6
POC PROTHROMBIN TIME: NORMAL

## 2024-08-05 PROCEDURE — 99212 OFFICE O/P EST SF 10 MIN: CPT | Performed by: PHARMACIST

## 2024-08-05 PROCEDURE — 85610 PROTHROMBIN TIME: CPT | Mod: QW | Performed by: PHARMACIST

## 2024-08-05 NOTE — PROGRESS NOTES
Coumadin Clinic Visit Note    Patient presents today for anticoagulation monitoring and adjustment.  Patient verified warfarin dosing schedule  Patient denies missing any doses  Patient denies unusual bruising or bleeding  Patient alcohol or tobacco use.  Consistent dietary green intake  There are no anticipated procedures at this time  INR Subtherapeutic today at 1.6  Pt uses a pill box. May have missed a dose. Not sure.  Increase dose by 2mg per week.  Explained to pt   Next appointment 3 weeks per pt request.      Amy Miner, PharmD

## 2024-08-26 ENCOUNTER — ANTICOAGULATION - WARFARIN VISIT (OUTPATIENT)
Dept: PHARMACY | Facility: CLINIC | Age: 79
End: 2024-08-26
Payer: MEDICARE

## 2024-08-26 DIAGNOSIS — I48.21 PERMANENT ATRIAL FIBRILLATION (MULTI): Primary | ICD-10-CM

## 2024-08-26 DIAGNOSIS — I48.0 PAROXYSMAL ATRIAL FIBRILLATION (MULTI): ICD-10-CM

## 2024-08-26 LAB
POC INR: 2.4
POC PROTHROMBIN TIME: NORMAL

## 2024-08-26 PROCEDURE — 99212 OFFICE O/P EST SF 10 MIN: CPT

## 2024-08-26 PROCEDURE — 85610 PROTHROMBIN TIME: CPT | Mod: QW

## 2024-08-26 NOTE — PROGRESS NOTES
No bleeding or unusual bruising.  Medications and doses verified.  Pt has been taking Warfarin 4mg every day, not 2mg on Weds.  No scheduled procedures at this time.  INR=2.4   Plan: Continue weekly dose.  Follow up INR check in 4 weeks.

## 2024-09-20 ENCOUNTER — APPOINTMENT (OUTPATIENT)
Dept: ORTHOPEDIC SURGERY | Facility: CLINIC | Age: 79
End: 2024-09-20
Payer: MEDICARE

## 2024-09-23 ENCOUNTER — ANTICOAGULATION - WARFARIN VISIT (OUTPATIENT)
Dept: PHARMACY | Facility: CLINIC | Age: 79
End: 2024-09-23
Payer: MEDICARE

## 2024-09-23 DIAGNOSIS — I48.21 PERMANENT ATRIAL FIBRILLATION (MULTI): Primary | ICD-10-CM

## 2024-09-23 DIAGNOSIS — I48.0 PAROXYSMAL ATRIAL FIBRILLATION (MULTI): ICD-10-CM

## 2024-09-23 LAB
POC INR: 2.2
POC PROTHROMBIN TIME: NORMAL

## 2024-09-23 PROCEDURE — 85610 PROTHROMBIN TIME: CPT | Mod: QW

## 2024-09-23 PROCEDURE — 99212 OFFICE O/P EST SF 10 MIN: CPT

## 2024-09-23 NOTE — PROGRESS NOTES
No bleeding or unusual bruising.  Medications and doses verified.  No scheduled procedures at this time.  INR=2.2   Plan: Continue same weekly dose.  Follow up INR check in 4 weeks.

## 2024-09-27 ENCOUNTER — OFFICE VISIT (OUTPATIENT)
Dept: ORTHOPEDIC SURGERY | Facility: CLINIC | Age: 79
End: 2024-09-27
Payer: MEDICARE

## 2024-09-27 ENCOUNTER — HOSPITAL ENCOUNTER (OUTPATIENT)
Dept: RADIOLOGY | Facility: CLINIC | Age: 79
Discharge: HOME | End: 2024-09-27
Payer: MEDICARE

## 2024-09-27 VITALS — BODY MASS INDEX: 24.2 KG/M2 | WEIGHT: 169 LBS | HEIGHT: 70 IN

## 2024-09-27 DIAGNOSIS — Z96.642 HISTORY OF REVISION OF TOTAL REPLACEMENT OF LEFT HIP JOINT: ICD-10-CM

## 2024-09-27 DIAGNOSIS — Z96.642 HISTORY OF REVISION OF TOTAL REPLACEMENT OF LEFT HIP JOINT: Primary | ICD-10-CM

## 2024-09-27 PROCEDURE — 99215 OFFICE O/P EST HI 40 MIN: CPT | Performed by: STUDENT IN AN ORGANIZED HEALTH CARE EDUCATION/TRAINING PROGRAM

## 2024-09-27 PROCEDURE — 1159F MED LIST DOCD IN RCRD: CPT | Performed by: STUDENT IN AN ORGANIZED HEALTH CARE EDUCATION/TRAINING PROGRAM

## 2024-09-27 PROCEDURE — 73502 X-RAY EXAM HIP UNI 2-3 VIEWS: CPT | Mod: LT

## 2024-09-27 PROCEDURE — 1123F ACP DISCUSS/DSCN MKR DOCD: CPT | Performed by: STUDENT IN AN ORGANIZED HEALTH CARE EDUCATION/TRAINING PROGRAM

## 2024-09-27 NOTE — PROGRESS NOTES
"GITA/TKA Related Summary           L hip  11/2/2022: Primary GITA (Dr. Michael Lopresti at [facility])  3/11/2024: B2 Periprosthetic fx stem revision, ORIF (myself at )  L knee: N  R hip: N  R knee: N  Lumbar surgery or significant pathology: N              CC/SUBJECTIVE/HPI            José Barth is a 79 y.o. male following up regarding below concerns. He is living at home and feeling well.     Left nondisplaced LC1 fracture: Progressive WBAT, no issues    Operative joint: L hip: S/p L revision GITA, femoral ORIF on 3/11/2024. No significant interval issues reported with operative site.  Reports outcome as good  Pain mgmt: OTC meds  Assistive device: cane intermittently when on uneven ground  Able to navigate stairs: Y  Sleeping normally: Y  PT: completed  DVT prophylaxis: baseline warfarin    Groin presumed fungal infxn: Resolved.              HISTORIES            Patient reports no major changes in health, substance use, or baseline medications since last visit.            OBJECTIVE            Physical exam  Estimated body mass index is 23.63 kg/m² as calculated from the following:    Height as of 6/19/24: 1.753 m (5' 9\").    Weight as of 6/19/24: 72.6 kg (160 lb).  Gen/psych: NAD, conversational, appropriate    Ambulation  Gait: Nonantalgic, independent on even ground  Assistive device: None  Spine: Unchanged from previous visit     Groin  Resolved.     Focused MSK exam: L  Rev GITA  Trendelenberg test: Neg.  Additionally, 4+/5 abductor testing  Skin/incision: Well healed  Tenderness: None  Pain with log roll: Neg  ROM: within expected range, nonpainful  Neurovascular  Strength: 4+/5 hip/knee flexion and extension, 4+/5 plantarflexion, 4/5 dorsiflexion  Sensory (L2-S1): SILT throughout lower extremity  Edema/stasis: 5mm pitting edema at ankle, improved from his last appointment  Pulse: DP 1+, PT 1+    Imaging  9/27/2024 L hip radiographs (AP Pelvis, AP/Lateral) on my read: Revision GITA components in " acceptable position with no sign of gross implant/hardware failure. Stem and greater trochanter in stable positions.             ASSESSMENT & PLAN           Patient verbalized understanding of below A&P. All questions answered.  Groin excoriation, resolved  Nystatin powder PRN  L nondisplaced LC1 fracture  WBAT  L GITA periprosthetic femur fracture (rTHA, ORIF 3/11/2024)  Micro: No further abx, as all intraop cultures NGF  Incision: Well healed  Activity: WBAT. Posterior precautions.  DVT ppx: Baseline warfarin (afib)  Swelling: Had considerable swelling in foot postop.  Continues to improve and is now only at ankle.  Will continue with compressive stockings as needed.  Follow-up: 1yr postop with XRs  PMH, PSH, other considerations discussed  Hx of L sciatica, lumbar radiculopathy  LLE chronic swelling, varicose veins  Occupation: Retired ( at [employer])  Hobbies: Working around the house, helping take care of his wife (multiple myeloma)

## 2024-10-21 ENCOUNTER — ANTICOAGULATION - WARFARIN VISIT (OUTPATIENT)
Dept: PHARMACY | Facility: CLINIC | Age: 79
End: 2024-10-21
Payer: COMMERCIAL

## 2024-10-21 DIAGNOSIS — I48.0 PAROXYSMAL ATRIAL FIBRILLATION (MULTI): ICD-10-CM

## 2024-10-21 DIAGNOSIS — I48.21 PERMANENT ATRIAL FIBRILLATION (MULTI): Primary | ICD-10-CM

## 2024-10-21 LAB
POC INR: 2.7
POC PROTHROMBIN TIME: NORMAL

## 2024-10-21 PROCEDURE — 99212 OFFICE O/P EST SF 10 MIN: CPT

## 2024-10-21 PROCEDURE — 85610 PROTHROMBIN TIME: CPT | Mod: QW

## 2024-11-18 ENCOUNTER — APPOINTMENT (OUTPATIENT)
Dept: PRIMARY CARE | Facility: CLINIC | Age: 79
End: 2024-11-18
Payer: COMMERCIAL

## 2024-11-18 ENCOUNTER — HOSPITAL ENCOUNTER (OUTPATIENT)
Dept: RADIOLOGY | Facility: CLINIC | Age: 79
Discharge: HOME | End: 2024-11-18
Payer: COMMERCIAL

## 2024-11-18 VITALS
WEIGHT: 157 LBS | HEIGHT: 69 IN | DIASTOLIC BLOOD PRESSURE: 70 MMHG | BODY MASS INDEX: 23.25 KG/M2 | HEART RATE: 91 BPM | OXYGEN SATURATION: 97 % | SYSTOLIC BLOOD PRESSURE: 113 MMHG

## 2024-11-18 DIAGNOSIS — M79.672 LEFT FOOT PAIN: ICD-10-CM

## 2024-11-18 DIAGNOSIS — M79.672 LEFT FOOT PAIN: Primary | ICD-10-CM

## 2024-11-18 PROCEDURE — 1158F ADVNC CARE PLAN TLK DOCD: CPT | Performed by: FAMILY MEDICINE

## 2024-11-18 PROCEDURE — G2211 COMPLEX E/M VISIT ADD ON: HCPCS | Performed by: FAMILY MEDICINE

## 2024-11-18 PROCEDURE — 99213 OFFICE O/P EST LOW 20 MIN: CPT | Performed by: FAMILY MEDICINE

## 2024-11-18 PROCEDURE — 73630 X-RAY EXAM OF FOOT: CPT | Mod: LT

## 2024-11-18 PROCEDURE — 1123F ACP DISCUSS/DSCN MKR DOCD: CPT | Performed by: FAMILY MEDICINE

## 2024-11-18 ASSESSMENT — ENCOUNTER SYMPTOMS
ARTHRALGIAS: 1
CONSTITUTIONAL NEGATIVE: 1
RESPIRATORY NEGATIVE: 1

## 2024-11-18 NOTE — PROGRESS NOTES
Subjective   Patient ID: José Barth is a 79 y.o. male who presents for Foot Injury (Left foot).  Foot Injury     Patient being seen for chief complaint being addressed we also needed to review patient's past medical history due to his complex medical problems we went over his significant other medical issues individually reviewed his medications and did an overview of his past labs.  And medications    Foot pain l 5th matatarsal    Review of Systems   Constitutional: Negative.    HENT: Negative.     Respiratory: Negative.     Musculoskeletal:  Positive for arthralgias and gait problem.        L foot pain 5th digit       Objective   Physical Exam  Constitutional:       Appearance: Normal appearance.   HENT:      Head: Normocephalic.      Comments: L facial bruise sp fall     Mouth/Throat:      Mouth: Mucous membranes are moist.   Eyes:      Extraocular Movements: Extraocular movements intact.      Pupils: Pupils are equal, round, and reactive to light.   Cardiovascular:      Rate and Rhythm: Normal rate and regular rhythm.   Pulmonary:      Effort: Pulmonary effort is normal.   Musculoskeletal:         General: Swelling and tenderness present.      Comments: L foot 5 digit   Neurological:      Mental Status: He is alert.         Assessment/Plan            Rudy Mcneil DO 11/18/24 2:58 PM

## 2024-11-25 ENCOUNTER — ANTICOAGULATION - WARFARIN VISIT (OUTPATIENT)
Dept: PHARMACY | Facility: CLINIC | Age: 79
End: 2024-11-25
Payer: COMMERCIAL

## 2024-11-25 ENCOUNTER — TELEPHONE (OUTPATIENT)
Dept: PRIMARY CARE | Facility: CLINIC | Age: 79
End: 2024-11-25

## 2024-11-25 DIAGNOSIS — J32.9 SINUSITIS, UNSPECIFIED CHRONICITY, UNSPECIFIED LOCATION: Primary | ICD-10-CM

## 2024-11-25 DIAGNOSIS — I48.21 PERMANENT ATRIAL FIBRILLATION (MULTI): Primary | ICD-10-CM

## 2024-11-25 DIAGNOSIS — I48.0 PAROXYSMAL ATRIAL FIBRILLATION (MULTI): ICD-10-CM

## 2024-11-25 LAB
POC INR: 2.8
POC PROTHROMBIN TIME: NORMAL

## 2024-11-25 PROCEDURE — 85610 PROTHROMBIN TIME: CPT | Mod: QW

## 2024-11-25 PROCEDURE — 99212 OFFICE O/P EST SF 10 MIN: CPT

## 2024-11-25 RX ORDER — AZITHROMYCIN 250 MG/1
TABLET, FILM COATED ORAL
Qty: 6 TABLET | Refills: 0 | Status: SHIPPED | OUTPATIENT
Start: 2024-11-25 | End: 2024-11-30

## 2024-11-25 NOTE — TELEPHONE ENCOUNTER
Called patient to advise him to make appointment to see Dr. Dorsey, podiatrist, referred by Dr. Jw Mcneil. Gave patient phone number and address.  [phone], [address] [facility]

## 2024-12-06 ENCOUNTER — TELEPHONE (OUTPATIENT)
Dept: PRIMARY CARE | Facility: CLINIC | Age: 79
End: 2024-12-06
Payer: COMMERCIAL

## 2024-12-06 NOTE — TELEPHONE ENCOUNTER
----- Message from Rudy Mcenil sent at 11/25/2024  8:05 AM EST -----  Patient has a broken bone in his foot his fifth metatarsal will need follow-up imaging send him over to Dr. Chatterjee is a podiatrist because these are slow healing fractures

## 2024-12-06 NOTE — TELEPHONE ENCOUNTER
Spoke to patient to give results : per Dr Escalera Patient has a broken bone in his foot his fifth metatarsal will need follow-up imaging send him over to Dr. Chatterjee is a podiatrist because these are slow healing fractures     Patient has appointment set for Monday

## 2024-12-23 PROBLEM — E78.00 PURE HYPERCHOLESTEROLEMIA: Status: RESOLVED | Noted: 2023-10-23 | Resolved: 2024-12-23

## 2024-12-30 ENCOUNTER — ANTICOAGULATION - WARFARIN VISIT (OUTPATIENT)
Dept: PHARMACY | Facility: CLINIC | Age: 79
End: 2024-12-30
Payer: COMMERCIAL

## 2024-12-30 DIAGNOSIS — I48.0 PAROXYSMAL ATRIAL FIBRILLATION (MULTI): Primary | ICD-10-CM

## 2024-12-30 LAB
POC INR: 2.2
POC PROTHROMBIN TIME: NORMAL

## 2024-12-30 PROCEDURE — 85610 PROTHROMBIN TIME: CPT | Mod: QW

## 2024-12-30 PROCEDURE — 99212 OFFICE O/P EST SF 10 MIN: CPT

## 2024-12-30 NOTE — PROGRESS NOTES
No bleeding or unusual bruising.  Medications and doses verified.  No scheduled procedures at this time.  INR=2.2   Plan: Continue same weekly dose.  Follow up INR check in 5 weeks.

## 2025-01-03 DIAGNOSIS — E78.5 HYPERLIPIDEMIA, UNSPECIFIED HYPERLIPIDEMIA TYPE: ICD-10-CM

## 2025-01-03 DIAGNOSIS — I10 HYPERTENSION, UNSPECIFIED TYPE: ICD-10-CM

## 2025-01-06 RX ORDER — METOPROLOL TARTRATE 25 MG/1
25 TABLET, FILM COATED ORAL 2 TIMES DAILY
Qty: 180 TABLET | Refills: 3 | Status: SHIPPED | OUTPATIENT
Start: 2025-01-06

## 2025-01-06 RX ORDER — ATORVASTATIN CALCIUM 20 MG/1
20 TABLET, FILM COATED ORAL DAILY
Qty: 90 TABLET | Refills: 3 | Status: SHIPPED | OUTPATIENT
Start: 2025-01-06

## 2025-01-08 ENCOUNTER — LAB (OUTPATIENT)
Dept: LAB | Facility: LAB | Age: 80
End: 2025-01-08
Payer: MEDICARE

## 2025-01-08 ENCOUNTER — APPOINTMENT (OUTPATIENT)
Dept: CARDIOLOGY | Facility: CLINIC | Age: 80
End: 2025-01-08
Payer: MEDICARE

## 2025-01-08 VITALS
OXYGEN SATURATION: 98 % | WEIGHT: 159.2 LBS | HEART RATE: 89 BPM | SYSTOLIC BLOOD PRESSURE: 148 MMHG | DIASTOLIC BLOOD PRESSURE: 80 MMHG | HEIGHT: 69 IN | BODY MASS INDEX: 23.58 KG/M2

## 2025-01-08 DIAGNOSIS — I48.21 PERMANENT ATRIAL FIBRILLATION (MULTI): ICD-10-CM

## 2025-01-08 DIAGNOSIS — D64.9 ANEMIA, UNSPECIFIED TYPE: Primary | ICD-10-CM

## 2025-01-08 DIAGNOSIS — E78.5 HYPERLIPIDEMIA, UNSPECIFIED HYPERLIPIDEMIA TYPE: ICD-10-CM

## 2025-01-08 PROBLEM — M25.559 ARTHRALGIA OF HIP: Status: RESOLVED | Noted: 2023-10-23 | Resolved: 2025-01-08

## 2025-01-08 LAB
CHOLEST SERPL-MCNC: 112 MG/DL (ref 0–199)
CHOLESTEROL/HDL RATIO: 2.7
ERYTHROCYTE [DISTWIDTH] IN BLOOD BY AUTOMATED COUNT: 13.8 % (ref 11.5–14.5)
FERRITIN SERPL-MCNC: 240 NG/ML (ref 20–300)
HCT VFR BLD AUTO: 44.1 % (ref 41–52)
HDLC SERPL-MCNC: 41.9 MG/DL
HGB BLD-MCNC: 14.7 G/DL (ref 13.5–17.5)
IRON SATN MFR SERPL: 44 % (ref 25–45)
IRON SERPL-MCNC: 157 UG/DL (ref 35–150)
LDLC SERPL CALC-MCNC: 58 MG/DL
MCH RBC QN AUTO: 33.9 PG (ref 26–34)
MCHC RBC AUTO-ENTMCNC: 33.3 G/DL (ref 32–36)
MCV RBC AUTO: 102 FL (ref 80–100)
NON HDL CHOLESTEROL: 70 MG/DL (ref 0–149)
NRBC BLD-RTO: 0 /100 WBCS (ref 0–0)
PLATELET # BLD AUTO: 193 X10*3/UL (ref 150–450)
Q ONSET: 226 MS
QRS COUNT: 15 BEATS
QRS DURATION: 80 MS
QT INTERVAL: 378 MS
QTC CALCULATION(BAZETT): 454 MS
QTC FREDERICIA: 427 MS
R AXIS: 6 DEGREES
RBC # BLD AUTO: 4.33 X10*6/UL (ref 4.5–5.9)
T AXIS: 23 DEGREES
T OFFSET: 415 MS
TIBC SERPL-MCNC: 356 UG/DL (ref 240–445)
TRIGL SERPL-MCNC: 63 MG/DL (ref 0–149)
UIBC SERPL-MCNC: 199 UG/DL (ref 110–370)
VENTRICULAR RATE: 87 BPM
VLDL: 13 MG/DL (ref 0–40)
WBC # BLD AUTO: 7.1 X10*3/UL (ref 4.4–11.3)

## 2025-01-08 PROCEDURE — 99214 OFFICE O/P EST MOD 30 MIN: CPT | Performed by: STUDENT IN AN ORGANIZED HEALTH CARE EDUCATION/TRAINING PROGRAM

## 2025-01-08 PROCEDURE — 83550 IRON BINDING TEST: CPT

## 2025-01-08 PROCEDURE — 83540 ASSAY OF IRON: CPT

## 2025-01-08 PROCEDURE — 80061 LIPID PANEL: CPT

## 2025-01-08 PROCEDURE — 1159F MED LIST DOCD IN RCRD: CPT | Performed by: STUDENT IN AN ORGANIZED HEALTH CARE EDUCATION/TRAINING PROGRAM

## 2025-01-08 PROCEDURE — 93010 ELECTROCARDIOGRAM REPORT: CPT | Performed by: STUDENT IN AN ORGANIZED HEALTH CARE EDUCATION/TRAINING PROGRAM

## 2025-01-08 PROCEDURE — 85027 COMPLETE CBC AUTOMATED: CPT

## 2025-01-08 PROCEDURE — 93005 ELECTROCARDIOGRAM TRACING: CPT | Performed by: STUDENT IN AN ORGANIZED HEALTH CARE EDUCATION/TRAINING PROGRAM

## 2025-01-08 PROCEDURE — 82728 ASSAY OF FERRITIN: CPT

## 2025-01-08 PROCEDURE — 1123F ACP DISCUSS/DSCN MKR DOCD: CPT | Performed by: STUDENT IN AN ORGANIZED HEALTH CARE EDUCATION/TRAINING PROGRAM

## 2025-01-08 RX ORDER — AMOXICILLIN 500 MG/1
2000 TABLET, FILM COATED ORAL
COMMUNITY
Start: 2024-07-01

## 2025-01-08 NOTE — PROGRESS NOTES
"Referred by Dr. Coleman for Hyperlipidemia and Atrial Fibrillation (Former [facility] patient)     History Of Present Illness:    José Barth is a 80 y.o. male past med history notable for permanent atrial fibrillation hyperlipidemia presents to clinic to reestablish care.  Patient previously seen in clinic 3 to 4 years ago.  He follows in anticoagulation clinic.  He denies chest pain shortness of breath.  His chronic lower extreme edema secondary to previous injury to his right lower extremity.  He takes metoprolol for rate management.  He is on atorvastatin for cholesterol.  Baseline ECG today shows atrial fibrillation which is rate controlled with a heart rate of 80.      Past Medical History:  He has a past medical history of Personal history of other diseases of the circulatory system and Personal history of other specified conditions.    Past Surgical History:  He has a past surgical history that includes Other surgical history (10/29/2021).      Social History:  He reports that he has never smoked. He has never used smokeless tobacco. He reports that he does not drink alcohol and does not use drugs.    Family History:  Family History   Problem Relation Name Age of Onset    Heart attack Father          Allergies:  Patient has no known allergies.    Outpatient Medications:  Current Outpatient Medications   Medication Instructions    acetaminophen (TYLENOL) 650 mg, oral, Every 6 hours PRN    amoxicillin (AMOXIL) 2,000 mg    atorvastatin (LIPITOR) 20 mg, oral, Daily    metoprolol tartrate (LOPRESSOR) 25 mg, oral, 2 times daily    warfarin (Coumadin) 4 mg tablet TAKE 1 TABLET BY MOUTH ONCE  DAILY EXCEPT 1/2 TABLET ON  WEDNESDAYS ONLY OR AS DIRECTED  PER [facility] COUMADIN [facility]        Last Recorded Vitals:  Vitals:    01/08/25 1350   BP: 148/80   BP Location: Left arm   Patient Position: Sitting   BP Cuff Size: Adult   Pulse: 89   SpO2: 98%   Weight: 72.2 kg (159 lb 3.2 oz)   Height: 1.753 m (5' 9\") " "      Physical Exam:  General: No acute distress,  A&O x3  Skin: Warm and dry  Neck: JVD is not elevated  ENT: Moist mucous membranes no lesions appreciated  Pulmonary: CTAB  Cards: Regular rate rhythm, no murmurs gallops or rubs appreciated normal S1-S2  Abdomen: Soft nontender nondistended  Extremities: No edema or cyanosis  Psych: Appropriate mood and affect          Last Labs:  CBC -  Lab Results   Component Value Date    WBC 11.3 03/13/2024    HGB 8.1 (L) 03/13/2024    HCT 21.9 (L) 03/13/2024    MCV 90 03/13/2024     (L) 03/13/2024       CMP -  Lab Results   Component Value Date    CALCIUM 7.4 (L) 03/13/2024    PROT 5.1 (L) 03/08/2024    ALBUMIN 3.3 (L) 03/08/2024    AST 18 03/08/2024    ALT 19 03/08/2024    ALKPHOS 67 03/08/2024    BILITOT 2.0 (H) 03/08/2024       LIPID PANEL -   Lab Results   Component Value Date    CHOL 105 11/08/2021    TRIG 58 11/08/2021    HDL 40.8 11/08/2021    CHHDL 2.6 11/08/2021    LDLF 53 11/08/2021    VLDL 12 11/08/2021       RENAL FUNCTION PANEL -   Lab Results   Component Value Date    GLUCOSE 123 (H) 03/13/2024     (L) 03/13/2024    K 4.4 03/13/2024     03/13/2024    CO2 27 03/13/2024    ANIONGAP 8 (L) 03/13/2024    BUN 22 03/13/2024    CREATININE 0.74 03/13/2024    GFRMALE 88 11/03/2022    CALCIUM 7.4 (L) 03/13/2024    ALBUMIN 3.3 (L) 03/08/2024        No results found for: \"BNP\", \"HGBA1C\"    Assessment/Plan     1.  Permanent atrial fibrillation: Takes warfarin for CVA prophylaxis.  Recent hospitalization where patient required transfusion and was found to be anemic.  Repeat labs for assessment with iron studies.  Metoprolol for rate management.  He is asymptomatic.    2.  Hyperlipidemia: Takes atorvastatin for lipid management.  We have ordered labs for further assessment.    Return to clinic in 1 year    (This note was generated with voice recognition software and may contain errors including spelling, grammar, syntax and missed recognition of what was " dictated, of which may not have been fully corrected)       Ryan Coleman MD PhD

## 2025-01-10 ENCOUNTER — TELEPHONE (OUTPATIENT)
Dept: CARDIOLOGY | Facility: CLINIC | Age: 80
End: 2025-01-10
Payer: MEDICARE

## 2025-01-10 NOTE — TELEPHONE ENCOUNTER
----- Message from Ryan Coleman sent at 1/10/2025  1:31 PM EST -----  Notify patient of stable test results. José Barth can follow up as scheduled.

## 2025-01-21 NOTE — PROGRESS NOTES
Patient presented for a blood pressure check. During the visit, the patient was offered the opportunity to participate in a research study that addresses the needs of hypertensive patients. After spending time with the clinical research coordinator, the patient was inadvertently discharged prior to having a blood pressure reading performed by the nurse.  Per the research coordinator, the patient's blood pressure was stable.    Physical Therapy    Physical Therapy Evaluation & Treatment    Patient Name: José Barth  MRN: [id number]  Today's Date: 3/12/2024   Time Calculation  Start Time: 1350  Stop Time: 1419  Time Calculation (min): 29 min    Assessment/Plan   PT Assessment  PT Assessment Results: Decreased strength, Decreased range of motion, Decreased endurance, Impaired balance, Decreased mobility, Pain, Orthopedic restrictions  Rehab Prognosis: Fair  Barriers to Discharge: none  Evaluation/Treatment Tolerance: Patient limited by pain  Medical Staff Made Aware: Yes  Strengths: Attitude of self, Premorbid level of function  Barriers to Participation: Comorbidities  End of Session Communication: Bedside nurse  Assessment Comment: Pt presenting with impaired mobiltiy and would benefit from moderate intensity PT to maximize functional mobility and safety.  End of Session Patient Position: Bed, 3 rail up, Alarm on   IP OR SWING BED PT PLAN  Inpatient or Swing Bed: Inpatient  PT Plan  Treatment/Interventions: Bed mobility, Transfer training, Gait training, Stair training, Balance training, Endurance training, Strengthening, Range of motion, Therapeutic exercise, Therapeutic activity, Positioning  PT Plan: Skilled PT  PT Frequency: Daily  PT Discharge Recommendations: Moderate intensity level of continued care  Equipment Recommended upon Discharge: Wheeled walker  PT Recommended Transfer Status: Assist x2  PT - OK to Discharge: Yes (Per POC)      Subjective     General Visit Information:  General  Reason for Referral: 79 year old male POD 1 left hip total arthroplasty revision  Referred By: Trev (APRN)  Past Medical History Relevant to Rehab: HTN, HLD, afib, fall 3-7-24 periprosthetic left femur fracture, sciatica left side  Family/Caregiver Present: Yes  Prior to Session Communication: Bedside nurse  Patient Position Received: Bed, 3 rail up, Alarm on  Preferred Learning Style: auditory, verbal  General Comment: Pt pleasant and  agreeable to PT  Home Living:  Home Living  Type of Home: House  Lives With: Spouse  Home Adaptive Equipment: Walker rolling or standard, Cane  Home Layout: One level  Home Access: Stairs to enter with rails  Entrance Stairs-Rails: Both  Entrance Stairs-Number of Steps: 3  Bathroom Shower/Tub: Walk-in shower  Bathroom Toilet: Handicapped height  Bathroom Equipment: Grab bars in shower, Built-in shower seat  Prior Level of Function:  Prior Function Per Pt/Caregiver Report  Level of [place]: Independent with ADLs and functional transfers, Independent with homemaking with ambulation  Receives Help From: Family  ADL Assistance: Independent  Homemaking Assistance: Independent  Ambulatory Assistance: Independent  Precautions:  Precautions  LE Weight Bearing Status: Left Toe-Touch Weight Bearing  Medical Precautions: Fall precautions  Vital Signs:       Objective   Pain:  Pain Assessment  Pain Assessment: 0-10  Pain Score: 8 (with movement)  Pain Type: Surgical pain  Pain Location: Hip  Pain Orientation: Left  Pain Interventions: Medication (See MAR)  Cognition:  Cognition  Overall Cognitive Status: Within Functional Limits  Orientation Level: Disoriented to time  Impulsive: Within functional limits  Processing Speed: Within funtional limits    General Assessments:  General Observation  General Observation: No apparent distress. hip abduction brace in place               Activity Tolerance  Endurance: Tolerates 10 - 20 min exercise with multiple rests (pain limited)    Sensation  Light Touch: No apparent deficits    Strength  Strength Comments: RLE WFLs, LLE impaired due to post op pain  Strength  Strength Comments: RLE WFLs, LLE impaired due to post op pain    Perception  Inattention/Neglect: Appears intact      Coordination  Movements are Fluid and Coordinated: Yes                 Functional Assessments:       Bed Mobility  Bed Mobility: Yes  Bed Mobility 1  Bed Mobility 1: Supine to sitting  Level of Assistance 1:  Maximum assistance (trial unsuccessful at this time. apprehension with movement of LLE)                      Extremity/Trunk Assessments:  RLE   RLE : Within Functional Limits  LLE   LLE : Exceptions to WFL (Impaired due to post op pain)  Treatments:  Therapeutic Exercise  Therapeutic Exercise Performed: Yes  Therapeutic Exercise Activity 1: bilat foot and ankle pumping x20  Therapeutic Exercise Activity 2: glut sets bilat x10  Therapeutic Exercise Activity 3: quad sets on left x10  Therapeutic Exercise Activity 4: heel slides as able on left x5.                             Bed Mobility  Bed Mobility: Yes  Bed Mobility 1  Bed Mobility 1: Supine to sitting  Level of Assistance 1: Maximum assistance (trial unsuccessful at this time. apprehension with movement of LLE)                    Outcome Measures:  [facility] Basic Mobility  Turning from your back to your side while in a flat bed without using bedrails: A lot  Moving from lying on your back to sitting on the side of a flat bed without using bedrails: A lot  Moving to and from bed to chair (including a wheelchair): A lot  Standing up from a chair using your arms (e.g. wheelchair or bedside chair): A lot  To walk in hospital room: Total  Climbing 3-5 steps with railing: Total  Basic Mobility - Total Score: 10    Encounter Problems       Encounter Problems (Active)       Mobility       STG - Patient will ambulate 10 feet with WB restrictions and min assist x1       Start:  03/12/24    Expected End:  03/26/24            STG - Patient will ascend and descend four to six stairs while holding 2 rails with min assist x1       Start:  03/12/24    Expected End:  03/26/24               PT Transfers       STG - Transfer from bed to chair with min assist x1 and FWW       Start:  03/12/24    Expected End:  03/26/24            STG - Patient to transfer to and from sit to supine with min assist x1       Start:  03/12/24    Expected End:  03/26/24            STG - Patient will  perform bed mobility with min assist x1       Start:  03/12/24    Expected End:  03/26/24            STG - Patient will transfer sit to and from stand with FWW and min assist x1       Start:  03/12/24    Expected End:  03/26/24                   Education Documentation  Handouts, taught by Shahida Hernández, PT at 3/12/2024  2:36 PM.  Learner: Patient  Readiness: Acceptance  Method: Explanation, Demonstration  Response: Verbalizes Understanding, Demonstrated Understanding, Needs Reinforcement    Precautions, taught by Shahida Hernández, PT at 3/12/2024  2:36 PM.  Learner: Patient  Readiness: Acceptance  Method: Explanation, Demonstration  Response: Verbalizes Understanding, Demonstrated Understanding, Needs Reinforcement    Body Mechanics, taught by Shahida Hernández, PT at 3/12/2024  2:36 PM.  Learner: Patient  Readiness: Acceptance  Method: Explanation, Demonstration  Response: Verbalizes Understanding, Demonstrated Understanding, Needs Reinforcement    Home Exercise Program, taught by Shahida Hernández, PT at 3/12/2024  2:36 PM.  Learner: Patient  Readiness: Acceptance  Method: Explanation, Demonstration  Response: Verbalizes Understanding, Demonstrated Understanding, Needs Reinforcement    Mobility Training, taught by Shahida Hernández, PT at 3/12/2024  2:36 PM.  Learner: Patient  Readiness: Acceptance  Method: Explanation, Demonstration  Response: Verbalizes Understanding, Demonstrated Understanding, Needs Reinforcement    Education Comments  No comments found.

## 2025-02-03 ENCOUNTER — ANTICOAGULATION - WARFARIN VISIT (OUTPATIENT)
Dept: PHARMACY | Facility: CLINIC | Age: 80
End: 2025-02-03
Payer: MEDICARE

## 2025-02-03 DIAGNOSIS — I48.0 PAROXYSMAL ATRIAL FIBRILLATION (MULTI): Primary | ICD-10-CM

## 2025-02-03 LAB
POC INR: 3.2
POC PROTHROMBIN TIME: NORMAL

## 2025-02-03 PROCEDURE — 85610 PROTHROMBIN TIME: CPT | Mod: QW | Performed by: PHARMACIST

## 2025-02-03 PROCEDURE — 99212 OFFICE O/P EST SF 10 MIN: CPT | Performed by: PHARMACIST

## 2025-02-03 NOTE — PROGRESS NOTES
Coumadin Clinic Visit Note    Patient presents today for anticoagulation monitoring and adjustment.  Patient verified warfarin dosing schedule  Patient denies missing any doses  Patient denies unusual bruising or bleeding  Patient denies changes to medications, alcohol or tobacco use.  Consistent dietary green intake  There are no anticipated procedures at this time  INR Subtherapeutic today at 3.2  Take only 2mg today.  Next appointment 4 weeks      Amy Miner, PharmD

## 2025-03-10 ENCOUNTER — ANTICOAGULATION - WARFARIN VISIT (OUTPATIENT)
Dept: PHARMACY | Facility: CLINIC | Age: 80
End: 2025-03-10
Payer: MEDICARE

## 2025-03-10 DIAGNOSIS — I48.0 PAROXYSMAL ATRIAL FIBRILLATION (MULTI): Primary | ICD-10-CM

## 2025-03-10 LAB
POC INR: 4.2
POC PROTHROMBIN TIME: NORMAL

## 2025-03-10 PROCEDURE — 99212 OFFICE O/P EST SF 10 MIN: CPT

## 2025-03-10 PROCEDURE — 85610 PROTHROMBIN TIME: CPT | Mod: QW

## 2025-03-10 NOTE — PROGRESS NOTES
No bleeding or unusual bruising.  Medications and doses verified.  No scheduled procedures at this time.  INR=4.2   No OTC.  Diet same/consistent, except eating more red meat lately.  No v/d.  Plan: Hold 2 doses then continue same weekly dose.  Follow up INR check in 2 weeks.

## 2025-03-24 ENCOUNTER — ANTICOAGULATION - WARFARIN VISIT (OUTPATIENT)
Dept: PHARMACY | Facility: CLINIC | Age: 80
End: 2025-03-24
Payer: MEDICARE

## 2025-03-24 DIAGNOSIS — I48.0 PAROXYSMAL ATRIAL FIBRILLATION (MULTI): Primary | ICD-10-CM

## 2025-03-24 LAB
POC INR: 2.8
POC PROTHROMBIN TIME: NORMAL

## 2025-03-24 PROCEDURE — 99212 OFFICE O/P EST SF 10 MIN: CPT | Performed by: PHARMACIST

## 2025-03-24 PROCEDURE — 85610 PROTHROMBIN TIME: CPT | Mod: QW | Performed by: PHARMACIST

## 2025-03-24 NOTE — PROGRESS NOTES
Pt says he drinks a lot of rum.  Says he is stopping though.  Asks how much he can drink.  Explained consistency.  He said he is cutting back quite a bit, but will still have some.    INR = 2.8  Verified current dose with pt.    No new meds or med changes since last visit.  Pt denies unusual bleed/bruise.  No upcoming procedures.  No missed doses  Continue same dose and check again in 4 weeks.

## 2025-03-28 ENCOUNTER — APPOINTMENT (OUTPATIENT)
Dept: ORTHOPEDIC SURGERY | Facility: CLINIC | Age: 80
End: 2025-03-28
Payer: MEDICARE

## 2025-03-28 ENCOUNTER — OFFICE VISIT (OUTPATIENT)
Dept: ORTHOPEDIC SURGERY | Facility: CLINIC | Age: 80
End: 2025-03-28
Payer: MEDICARE

## 2025-03-28 ENCOUNTER — HOSPITAL ENCOUNTER (OUTPATIENT)
Dept: RADIOLOGY | Facility: CLINIC | Age: 80
Discharge: HOME | End: 2025-03-28
Payer: MEDICARE

## 2025-03-28 DIAGNOSIS — Z96.642 HISTORY OF REVISION OF TOTAL REPLACEMENT OF LEFT HIP JOINT: ICD-10-CM

## 2025-03-28 DIAGNOSIS — Z96.642 HISTORY OF REVISION OF TOTAL REPLACEMENT OF LEFT HIP JOINT: Primary | ICD-10-CM

## 2025-03-28 PROCEDURE — 99213 OFFICE O/P EST LOW 20 MIN: CPT | Performed by: STUDENT IN AN ORGANIZED HEALTH CARE EDUCATION/TRAINING PROGRAM

## 2025-03-28 PROCEDURE — 73502 X-RAY EXAM HIP UNI 2-3 VIEWS: CPT | Mod: LT

## 2025-03-28 PROCEDURE — 1123F ACP DISCUSS/DSCN MKR DOCD: CPT | Performed by: STUDENT IN AN ORGANIZED HEALTH CARE EDUCATION/TRAINING PROGRAM

## 2025-03-28 PROCEDURE — 1159F MED LIST DOCD IN RCRD: CPT | Performed by: STUDENT IN AN ORGANIZED HEALTH CARE EDUCATION/TRAINING PROGRAM

## 2025-03-28 NOTE — PROGRESS NOTES
"GITA/TKA Related Summary           L hip  11/2/2022: Primary GITA (Dr. Michael Lopresti at [facility])  3/11/2024: B2 Periprosthetic fx stem revision, ORIF (myself at )  L knee: N  R hip: N  R knee: N  Lumbar surgery or significant pathology: N            CC/SUBJECTIVE/HPI            José Barth is a 79 y.o. male following up regarding below concerns. He is living at home and feeling well.     Operative joint: L hip: S/p L revision GITA, femoral ORIF on 3/11/2024. No significant interval issues reported with operative site.  Reports outcome as good  Pain mgmt: none  Assistive device: cane  Able to navigate stairs: Y  Sleeping normally: Y  PT: completed    Left nondisplaced LC1 fracture: Resolved    Groin presumed fungal infxn: Resolved.             HISTORIES            Patient reports no major changes in health, substance use, or baseline medications since last visit.            OBJECTIVE            Physical exam  Estimated body mass index is 23.51 kg/m² as calculated from the following:    Height as of 1/8/25: 1.753 m (5' 9\").    Weight as of 1/8/25: 72.2 kg (159 lb 3.2 oz).  Gen/psych: NAD, conversational, appropriate    Ambulation  Gait: normal  Assistive device: cane  Spine: Unchanged from previous visit    Focused MSK exam: L  Rev GITA  Trendelenberg test: Neg.  Additionally, 4+/5 abductor testing  Skin/incision: Well healed  Tenderness: None  Pain with log roll: Neg  ROM: within expected range, nonpainful  Neurovascular  Strength: 4+/5 hip/knee flexion and extension, 4+/5 plantarflexion, 4/5 dorsiflexion  Sensory (L2-S1): SILT throughout lower extremity  Edema/stasis: no pitting edema, improved from previous  Pulse: DP 1+, PT 1+    Imaging  3/28/2025 L hip radiographs (AP Pelvis, AP/Lateral) on my read: Revision GITA components in acceptable position with no sign of gross implant/hardware failure. Stem and greater trochanter in stable positions. Notable fx callus continues to fill in nicely           "   ASSESSMENT & PLAN           Patient verbalized understanding of below A&P. All questions answered.  L GITA periprosthetic femur fracture (rTHA, ORIF 3/11/2024)  Micro: All intraop cultures NGF  Activity: WBAT. Posterior precautions.  Swelling: Had considerable swelling in foot postop.  Has now resolved  Follow-up: 3yr postop  L nondisplaced LC1 fracture  Resolved, WBAT  Groin excoriation, resolved  Resolved, nystatin powder PRN  PMH, PSH, other considerations discussed  Hx of L sciatica, lumbar radiculopathy  LLE chronic swelling, varicose veins  Occupation: Retired ( at [employer])  Hobbies: Working around the house, helping take care of his wife (multiple myeloma)  PCP: Rudy Mcneil DO

## 2025-03-29 DIAGNOSIS — I48.91 ATRIAL FIBRILLATION, UNSPECIFIED TYPE (MULTI): ICD-10-CM

## 2025-03-31 RX ORDER — WARFARIN 4 MG/1
TABLET ORAL
Qty: 90 TABLET | Refills: 1 | Status: SHIPPED | OUTPATIENT
Start: 2025-03-31

## 2025-03-31 NOTE — TELEPHONE ENCOUNTER
[place] to [facility]  Warfarin 4 mg tablet  Take 4 mg every day or UD  #90 (90 day)   1 RF  Dr. Wali Chen

## 2025-04-21 ENCOUNTER — ANTICOAGULATION - WARFARIN VISIT (OUTPATIENT)
Dept: PHARMACY | Facility: CLINIC | Age: 80
End: 2025-04-21
Payer: MEDICARE

## 2025-04-21 DIAGNOSIS — I48.0 PAROXYSMAL ATRIAL FIBRILLATION (MULTI): Primary | ICD-10-CM

## 2025-04-21 LAB
POC INR: 3.6 (ref 0.9–1.1)
POC PROTHROMBIN TIME: ABNORMAL (ref 9.3–12.5)

## 2025-04-21 PROCEDURE — 99212 OFFICE O/P EST SF 10 MIN: CPT | Performed by: PHARMACIST

## 2025-04-21 PROCEDURE — 85610 PROTHROMBIN TIME: CPT | Mod: QW | Performed by: PHARMACIST

## 2025-04-21 NOTE — PROGRESS NOTES
Coumadin Clinic Visit Note    Patient presents today for anticoagulation monitoring and adjustment.  Patient verified warfarin dosing schedule  Patient denies missing any doses  Patient denies unusual bruising or bleeding  Patient denies changes to medications, alcohol or tobacco use.  Consistent dietary green intake  There are no anticipated procedures at this time  INR Supratherapeutic today at 3.6  Hold 1 dose today.  Next appointment 4 weeks      Amy Miner, PharmD

## 2025-05-19 ENCOUNTER — ANTICOAGULATION - WARFARIN VISIT (OUTPATIENT)
Dept: PHARMACY | Facility: CLINIC | Age: 80
End: 2025-05-19
Payer: MEDICARE

## 2025-05-19 DIAGNOSIS — I48.0 PAROXYSMAL ATRIAL FIBRILLATION (MULTI): Primary | ICD-10-CM

## 2025-05-19 LAB
POC INR: 2.4 (ref 0.9–1.1)
POC PROTHROMBIN TIME: ABNORMAL (ref 9.3–12.5)

## 2025-05-19 PROCEDURE — 85610 PROTHROMBIN TIME: CPT | Mod: QW | Performed by: PHARMACIST

## 2025-05-19 PROCEDURE — 99212 OFFICE O/P EST SF 10 MIN: CPT | Performed by: PHARMACIST

## 2025-05-19 NOTE — PROGRESS NOTES
Coumadin Clinic Visit Note    Patient presents today for anticoagulation monitoring and adjustment.  Patient verified warfarin dosing schedule  Patient denies missing any doses  Patient denies unusual bruising or bleeding  Patient denies changes to medications, alcohol or tobacco use.  Consistent dietary green intake  There are no anticipated procedures at this time  INR Therapeutic today at 2.4  No changes to warfarin dose today  Next appointment 4 weeks      Amy Miner, PharmD

## 2025-06-16 ENCOUNTER — ANTICOAGULATION - WARFARIN VISIT (OUTPATIENT)
Dept: PHARMACY | Facility: CLINIC | Age: 80
End: 2025-06-16
Payer: MEDICARE

## 2025-06-16 DIAGNOSIS — I48.0 PAROXYSMAL ATRIAL FIBRILLATION (MULTI): Primary | ICD-10-CM

## 2025-06-16 LAB
POC INR: 3.1 (ref 0.9–1.1)
POC PROTHROMBIN TIME: ABNORMAL (ref 9.3–12.5)

## 2025-06-16 PROCEDURE — 99212 OFFICE O/P EST SF 10 MIN: CPT | Performed by: PHARMACIST

## 2025-06-16 PROCEDURE — 85610 PROTHROMBIN TIME: CPT | Mod: QW | Performed by: PHARMACIST

## 2025-06-16 NOTE — PROGRESS NOTES
Verified current dose with pt.    No new meds or med changes since last visit.  Pt denies unusual bleed/bruise.  No upcoming procedures.  No missed doses  No dietary changes  Inr = 3.1  Continue same dose and check again in 4 weeks.

## 2025-07-14 ENCOUNTER — ANTICOAGULATION - WARFARIN VISIT (OUTPATIENT)
Dept: PHARMACY | Facility: CLINIC | Age: 80
End: 2025-07-14
Payer: MEDICARE

## 2025-07-14 DIAGNOSIS — I48.0 PAROXYSMAL ATRIAL FIBRILLATION (MULTI): Primary | ICD-10-CM

## 2025-07-14 LAB
POC INR: 3.5 (ref 0.9–1.1)
POC PROTHROMBIN TIME: ABNORMAL (ref 9.3–12.5)

## 2025-07-14 PROCEDURE — 99212 OFFICE O/P EST SF 10 MIN: CPT

## 2025-07-14 PROCEDURE — 85610 PROTHROMBIN TIME: CPT | Mod: QW

## 2025-07-14 NOTE — PROGRESS NOTES
Coumadin Clinic Visit Note    Patient verified warfarin dose  No missed doses  No unusual bruising or bleeding  No changes to medications  Consistent dietary green intake  No anticipated procedures at this time  INR Supratherapeutic today at 3.5 ,  no tylenol , same greens , no alcohol , no smoking . Sickness , no skip and reduce dose since been trending high Next appointment 4 weeks      Patricia Pa, PharmD

## 2025-07-15 DIAGNOSIS — I48.21 PERMANENT ATRIAL FIBRILLATION (MULTI): Primary | ICD-10-CM

## 2025-08-09 DIAGNOSIS — I48.91 ATRIAL FIBRILLATION, UNSPECIFIED TYPE (MULTI): ICD-10-CM

## 2025-08-11 ENCOUNTER — ANTICOAGULATION - WARFARIN VISIT (OUTPATIENT)
Dept: PHARMACY | Facility: CLINIC | Age: 80
End: 2025-08-11
Payer: MEDICARE

## 2025-08-11 DIAGNOSIS — I48.21 PERMANENT ATRIAL FIBRILLATION (MULTI): ICD-10-CM

## 2025-08-11 DIAGNOSIS — I48.0 PAROXYSMAL ATRIAL FIBRILLATION (MULTI): Primary | ICD-10-CM

## 2025-08-11 LAB
POC INR: 2.9 (ref 0.9–1.1)
POC PROTHROMBIN TIME: ABNORMAL (ref 9.3–12.5)

## 2025-08-11 PROCEDURE — 99212 OFFICE O/P EST SF 10 MIN: CPT

## 2025-08-11 PROCEDURE — 85610 PROTHROMBIN TIME: CPT | Mod: QW

## 2025-08-11 RX ORDER — WARFARIN 4 MG/1
TABLET ORAL
Qty: 90 TABLET | Refills: 1 | Status: SHIPPED | OUTPATIENT
Start: 2025-08-11

## 2025-09-02 ENCOUNTER — APPOINTMENT (OUTPATIENT)
Dept: PRIMARY CARE | Facility: CLINIC | Age: 80
End: 2025-09-02
Payer: MEDICARE

## 2025-09-11 ENCOUNTER — APPOINTMENT (OUTPATIENT)
Dept: PRIMARY CARE | Facility: CLINIC | Age: 80
End: 2025-09-11
Payer: MEDICARE

## (undated) DEVICE — SUTURE, ETHIBOND XTRA, 5 V-37, GRN/BR, LF

## (undated) DEVICE — DRESSING, MEPILEX BORDER, POST-OP AG, 4 X 10 IN

## (undated) DEVICE — APPLICATOR, CHLORAPREP, W/ORANGE TINT, 26ML

## (undated) DEVICE — STAPLER, SKIN PROXIMATE, 35 WIDE

## (undated) DEVICE — SYRINGE, 60 CC, LUER LOCK, MONOJECT, W/O CAP, LF

## (undated) DEVICE — DRESSING, GAUZE, PETROLATUM, XEROFORM, 5 X 9 IN, STERILE

## (undated) DEVICE — SUTURE, PDS II, 1, 36 IN, CT-1, VIOLET

## (undated) DEVICE — DRAINAGE SET, CLOSED WOUND, MED-LG, PVC, 3/16 IN, DAVOL, 15 FR, 400 CC

## (undated) DEVICE — Device

## (undated) DEVICE — SUTURE, MONOCRYL, 2-0, 27 IN, SH/V-20 , UNDYED

## (undated) DEVICE — SUTURE, MONOCRYL 2-0 UR-6 27 IN, VIOLET

## (undated) DEVICE — HIGH FLOW TIP FOR INTERPULSE HANDPIECE SET

## (undated) DEVICE — SUTURE, ETHILON, 3-0, 18 IN, PS1, BLACK

## (undated) DEVICE — WOUND SYSTEM, DEBRIDEMENT & CLEANING, O.R DUOPAK

## (undated) DEVICE — SUTURE, MONOCRYL, 3-0, 27 IN, PS-2, UNDYED

## (undated) DEVICE — BANDAGE, ELASTIC, FLEXMASTER, 6 IN, DBL LENGTH, STERILE

## (undated) DEVICE — INTERPULSE HANDPIECE SET W/ 10FT SUCTION TUBING

## (undated) DEVICE — POUCH, FLUID COLLECTION, MULTIPURPOSE

## (undated) DEVICE — TOWEL PACK, STERILE, 16X24, XRAY DETECTABLE, BLUE, 4/PK

## (undated) DEVICE — SUTURE, VICRYL, 1, 36 IN, CTX, VIOLET